# Patient Record
Sex: FEMALE | Race: WHITE | Employment: FULL TIME | URBAN - METROPOLITAN AREA
[De-identification: names, ages, dates, MRNs, and addresses within clinical notes are randomized per-mention and may not be internally consistent; named-entity substitution may affect disease eponyms.]

---

## 2017-05-07 DIAGNOSIS — G43.019 INTRACTABLE MIGRAINE WITHOUT AURA AND WITHOUT STATUS MIGRAINOSUS: ICD-10-CM

## 2017-05-08 NOTE — TELEPHONE ENCOUNTER
Imitrex       Last Written Prescription Date: 10/5/2016  Last Fill Quantity: 30, # refills: 0  Last Office Visit with [facility], [facility] or [facility] prescribing provider: 9/23/2015       BP Readings from Last 3 Encounters:   09/23/15 110/74   08/12/15 110/60   08/03/15 110/68

## 2017-05-09 NOTE — TELEPHONE ENCOUNTER
Routing refill request to provider for review/approval because:  Patient needs to be seen because it has been more than 1 year since last office visit.  Aleksandra Oakes RN

## 2017-05-10 RX ORDER — SUMATRIPTAN 100 MG/1
TABLET, FILM COATED ORAL
Qty: 30 TABLET | Refills: 0 | Status: SHIPPED | OUTPATIENT
Start: 2017-05-10 | End: 2017-08-15

## 2017-07-31 DIAGNOSIS — L70.9 ACNE, UNSPECIFIED ACNE TYPE: ICD-10-CM

## 2017-07-31 NOTE — TELEPHONE ENCOUNTER
Minocycline      Last Written Prescription Date: 10/05/16  Last Fill Quantity: 180,  # refills: 0   Last Office Visit with [facility], [facility] or  [facility] prescribing provider: 2015                                         Next 5 appointments (look out 90 days)     Aug 15, 2017  4:15 PM CDT   [facility] with Wendy Woodall PA-C   [facility]    [address] [place] [id number] [phone]

## 2017-08-01 RX ORDER — MINOCYCLINE HYDROCHLORIDE 50 MG/1
50 CAPSULE ORAL 2 TIMES DAILY
Qty: 180 CAPSULE | Refills: 0 | Status: SHIPPED | OUTPATIENT
Start: 2017-08-01 | End: 2017-08-15

## 2017-08-08 ENCOUNTER — HOSPITAL ENCOUNTER (EMERGENCY)
Facility: CLINIC | Age: 22
Discharge: HOME | End: 2017-08-09
Attending: PHYSICIAN ASSISTANT | Admitting: PHYSICIAN ASSISTANT
Payer: COMMERCIAL

## 2017-08-08 DIAGNOSIS — G43.001 MIGRAINE WITHOUT AURA AND WITH STATUS MIGRAINOSUS, NOT INTRACTABLE: ICD-10-CM

## 2017-08-08 PROCEDURE — 96365 THER/PROPH/DIAG IV INF INIT: CPT

## 2017-08-08 PROCEDURE — 99284 EMERGENCY DEPT VISIT MOD MDM: CPT | Mod: Z6 | Performed by: PHYSICIAN ASSISTANT

## 2017-08-08 PROCEDURE — 99284 EMERGENCY DEPT VISIT MOD MDM: CPT | Mod: 25

## 2017-08-08 PROCEDURE — 96375 TX/PRO/DX INJ NEW DRUG ADDON: CPT

## 2017-08-08 PROCEDURE — 25000128 H RX IP 250 OP 636: Performed by: PHYSICIAN ASSISTANT

## 2017-08-08 RX ORDER — SODIUM CHLORIDE 9 MG/ML
1000 INJECTION, SOLUTION INTRAVENOUS CONTINUOUS
Status: DISCONTINUED | OUTPATIENT
Start: 2017-08-08 | End: 2017-08-09 | Stop reason: HOSPADM

## 2017-08-08 RX ORDER — MAGNESIUM SULFATE 1 G/100ML
1 INJECTION INTRAVENOUS ONCE
Status: COMPLETED | OUTPATIENT
Start: 2017-08-08 | End: 2017-08-08

## 2017-08-08 RX ORDER — KETOROLAC TROMETHAMINE 30 MG/ML
30 INJECTION, SOLUTION INTRAMUSCULAR; INTRAVENOUS ONCE
Status: COMPLETED | OUTPATIENT
Start: 2017-08-08 | End: 2017-08-08

## 2017-08-08 RX ORDER — DIPHENHYDRAMINE HYDROCHLORIDE 50 MG/ML
25 INJECTION INTRAMUSCULAR; INTRAVENOUS ONCE
Status: COMPLETED | OUTPATIENT
Start: 2017-08-08 | End: 2017-08-08

## 2017-08-08 RX ADMIN — KETOROLAC TROMETHAMINE 30 MG: 30 INJECTION, SOLUTION INTRAMUSCULAR at 22:55

## 2017-08-08 RX ADMIN — SODIUM CHLORIDE, PRESERVATIVE FREE 1000 ML: 5 INJECTION INTRAVENOUS at 22:52

## 2017-08-08 RX ADMIN — PROCHLORPERAZINE EDISYLATE 10 MG: 5 INJECTION INTRAMUSCULAR; INTRAVENOUS at 22:57

## 2017-08-08 RX ADMIN — DIPHENHYDRAMINE HYDROCHLORIDE 25 MG: 50 INJECTION, SOLUTION INTRAMUSCULAR; INTRAVENOUS at 22:53

## 2017-08-08 RX ADMIN — MAGNESIUM SULFATE IN DEXTROSE 1 G: 10 INJECTION, SOLUTION INTRAVENOUS at 23:00

## 2017-08-08 NOTE — ED AVS SNAPSHOT
[facility] Emergency Department    [address] [place] [address]    Phone:  [phone]    Fax:  [phone]                                       Aleksandra Lucas   MRN: [id number]    Department:  [facility] Emergency Department   Date of Visit:  8/8/2017           After Visit Summary Signature Page     I have received my discharge instructions, and my questions have been answered. I have discussed any challenges I see with this plan with the nurse or doctor.    ..........................................................................................................................................  Patient/Patient Representative Signature      ..........................................................................................................................................  Patient Representative Print Name and Relationship to Patient    ..................................................               ................................................  Date                                            Time    ..........................................................................................................................................  Reviewed by Signature/Title    ...................................................              ..............................................  Date                                                            Time

## 2017-08-08 NOTE — ED AVS SNAPSHOT
[facility] Emergency Department    [address] [place] [address]    Phone:  [phone]    Fax:  [phone]                                       Aleksandra Lucas   MRN: [id number]    Department:  [facility] Emergency Department   Date of Visit:  8/8/2017           Patient Information     Date Of Birth          1995        Your diagnoses for this visit were:     Migraine without aura and with status migrainosus, not intractable        You were seen by Michel Burris PA-C.      Future Appointments        Provider Department Dept Phone Center    8/15/2017 4:15 PM Wendy Woodall PA-C [facility] [phone] [facility]      24 Hour Appointment Hotline       To make an appointment at any [facility], call [phone] ([phone]). If you don't have a family doctor or clinic, we will help you find one. [facility] are conveniently located to serve the needs of you and your family.             Review of your medicines      Our records show that you are taking the medicines listed below. If these are incorrect, please call your family doctor or clinic.        Dose / Directions Last dose taken    minocycline 50 MG capsule   Commonly known as:  MINOCIN/DYNACIN   Dose:  50 mg   Quantity:  180 capsule        Take 1 capsule (50 mg) by mouth 2 times daily   Refills:  0        SUMAtriptan 100 MG tablet   Commonly known as:  IMITREX   Quantity:  30 tablet        TAKE ONE TABLET BY MOUTH AT ONSET OF HEADACHE FOR MIGRAINE   Refills:  0                Procedures and tests performed during your visit     Peripheral IV: Standard      Orders Needing Specimen Collection     None      Pending Results     No orders found from 8/6/2017 to 8/9/2017.            Pending Culture Results     No orders found from 8/6/2017 to 8/9/2017.            Pending Results Instructions     If you had any lab results that were not finalized at the time of your Discharge, you  "can call the ED Lab Result RN at [phone]. You will be contacted by this team for any positive Lab results or changes in treatment. The nurses are available 7 days a week from 10A to 6:30P.  You can leave a message 24 hours per day and they will return your call.        Thank you for choosing [facility]       Thank you for choosing [facility] for your care. Our goal is always to provide you with excellent care. Hearing back from our patients is one way we can continue to improve our services. Please take a few minutes to complete the written survey that you may receive in the mail after you visit with us. Thank you!        [employer] Information     [employer] lets you send messages to your doctor, view your test results, renew your prescriptions, schedule appointments and more. To sign up, go to [email or web address] . Click on \"Log in\" on the left side of the screen, which will take you to the Welcome page. Then click on \"Sign up Now\" on the right side of the page.     You will be asked to enter the access code listed below, as well as some personal information. Please follow the directions to create your username and password.     Your access code is: [email or web address]  Expires: 2017 11:57 PM     Your access code will  in 90 days. If you need help or a new code, please call your [facility] clinic or [phone].        Care EveryWhere ID     This is your Care EveryWhere ID. This could be used by other organizations to access your [facility] medical records  [id number]        Equal Access to Services     DEEP THOMPSON : Hadii aad ku hadasho Sofinnali, waaxda luqadaha, qaybta kaalmada adedmitriyyada, ninfa villarreal. So [facility] [phone].    ATENCIÓN: Si habla español, tiene a brito disposición servicios gratuitos de asistencia lingüística. Llame al [phone].    We comply with applicable federal civil rights laws and [place] laws. We do not discriminate on the basis of race, color, national " origin, age, disability sex, sexual orientation or gender identity.            After Visit Summary       This is your record. Keep this with you and show to your community pharmacist(s) and doctor(s) at your next visit.

## 2017-08-09 VITALS
SYSTOLIC BLOOD PRESSURE: 111 MMHG | BODY MASS INDEX: 21.66 KG/M2 | WEIGHT: 130 LBS | TEMPERATURE: 97.6 F | OXYGEN SATURATION: 98 % | DIASTOLIC BLOOD PRESSURE: 71 MMHG | RESPIRATION RATE: 16 BRPM | HEIGHT: 65 IN

## 2017-08-09 NOTE — ED NOTES
Pt reports headache since Sunday that is about the same as what she normally has. Pt reports pain behind both eyes and back of neck with nausea but denies vomiting. Pt took a dose of Imitrex at 1600 and again at 1900, states her pain increased over the past 3 hours.

## 2017-08-09 NOTE — DISCHARGE INSTRUCTIONS
Preventing Triggers:      The first step in preventing migraines is to learn what triggers them. You may then be able to control your triggers to avoid or reduce the severity of your migraines.  Know your triggers  Be aware that you may have more than one trigger, and that some triggers may work together. Common migraine triggers include:    Food and nutrition. Skipping meals or not drinking enough water can trigger headaches. So can certain foods, such as caffeine, monosodium glutamate (MSG), aged cheese, or sausage.    Alcohol. Red wine and other alcoholic beverages are common migraine triggers.    Chemicals. Scents, cleaning products, gasoline, glue, perfume, and paint can be triggers. So can tobacco smoke, including secondhand smoke.    Emotions. Stress can trigger headaches or make them worse once they begin.    Sleep disruption. Staying up late, sleeping late, and traveling across time zones can disrupt your sleep cycle, triggering headaches.    Hormones. Many women notice that migraines tend to happen at a certain point in their menstrual cycle. Birth control pills or hormone replacement therapy may also trigger migraines.    Environment and weather. Air travel, changes in altitude, air pressure changes, hot sun, or bright or flashing lights can be triggers.  Control your triggers  These are some of the things you can do to try to control triggers:    Avoid triggers if you can. For example, stay clear of alcohol and foods that trigger your headaches. Use unscented household products. Keep regular sleep habits. Manage stress to help control emotional triggers.    Change your behavior at times when triggers can't be avoided. For example, make sure to get enough rest and drink plenty of water while you're traveling. Make sure to carry a hat, sunglasses, and your medicines. Be alert for migraine symptoms, so you can treat a migraine early if it happens.  Date Last Reviewed: 10/9/2015    [id number] [facility]  [facility]. [address] [place] [address], [place] [address]. All rights reserved. This information is not intended as a substitute for professional medical care. Always follow your healthcare professional's instructions.

## 2017-08-09 NOTE — ED PROVIDER NOTES
History     Chief Complaint   Patient presents with     Headache     HPI  Aleksandra Lucas is a 21 year old female who presents for evaluation of a migraine headache that started 3 days prior. She denies any recent head injury or MVA. No fevers, chills, or URI symptoms. Headache starts in the left posterior occipital area and radiates around to the left post orbital area. She describes it as a tension and squeezing type feeling with pain rated 8 on a scale of 10. Associated symptoms of photophobia, phonophobia, and nausea without vomiting. This is typical for her migraines. She is getting about 2 migraines per month. Usually her headaches resolved with Imitrex p.r.n., but this did not work for this headache. She has also trialed Tylenol and ibuprofen. No OTC pain medications were used today. She denies any extremity numbness, tingling, or weakness.    I have reviewed the Medications, Allergies, Past Medical and Surgical History, and Social History in the Epic system.    Allergies:   Allergies   Allergen Reactions     No Known Allergies          No current facility-administered medications on file prior to encounter.   Current Outpatient Prescriptions on File Prior to Encounter:  minocycline (MINOCIN/DYNACIN) 50 MG capsule Take 1 capsule (50 mg) by mouth 2 times daily   SUMAtriptan (IMITREX) 100 MG tablet TAKE ONE TABLET BY MOUTH AT ONSET OF HEADACHE FOR MIGRAINE       Patient Active Problem List   Diagnosis     History of pancreatitis     Acne     Factor 5 Leiden mutation, heterozygous (H)     Heavy periods     Intractable migraine without aura and without status migrainosus       Past Surgical History:   Procedure Laterality Date     OPEN REDUCTION INTERNAL FIXATION ELBOW  8/23/2011    Procedure:OPEN REDUCTION INTERNAL FIXATION ELBOW; open reduction internal fixation left elbow; Surgeon:TOOTIE DRAKE; Location: OR       Social History   Substance Use Topics     Smoking status: Never Smoker     Smokeless  "tobacco: Never Used      Comment: no smoking in the home     Alcohol use No       Most Recent Immunizations   Administered Date(s) Administered     DTAP (<7y) 09/26/2001     HIB 09/25/1997     HPVQuadrivalent 04/08/2013     HepB-Peds 08/03/2015     Hepatitis A Vac Ped/Adol-2 Dose 08/28/2007     Influenza (IIV3) 11/10/2011     Influenza Vaccine IM 3yrs+ 4 Valent IIV4 10/22/2013     MMR 09/26/2001     Meningococcal (Menactra ) 08/03/2015     OPV 03/27/1997     Poliovirus, inactivated (IPV) 09/26/2001     TDAP Vaccine (Adacel) 07/06/2009     Tetramune (DtP/HIB) 01/27/1997     Varicella 08/28/2007       BMI: Estimated body mass index is 21.63 kg/(m^2) as calculated from the following:    Height as of this encounter: 1.651 m (5' 5\").    Weight as of this encounter: 59 kg (130 lb).      Review of Systems   All other systems reviewed and are negative.      Physical Exam   BP: (!) 136/96  Heart Rate: 79  Temp: 97.6  F (36.4  C)  Resp: 16  Height: 165.1 cm (5' 5\")  Weight: 59 kg (130 lb)  SpO2: 100 %  Physical Exam  Generally healthy appearing female in NAD who is active and non-toxic appearing.   Head: Normocephalic, atraumatic, nontender to palpation  Eyes: PERRLA, conjunctiva and sclera clear.  EOM's intact.  Ears: Bilateral TM's and canals are clear.  TM's translucent without erythema or effusion.  Nose: Nares normal and patent bilaterally.  Mucous membranes are non-erythematous and non-edematous.  No sinus tenderness.  Throat: Mucous membranes are clear.  No tonsilar hypertrophy, exudate, or erythema.  Neck: Supple.  FROM without pain.  No adenopathy.  No thyromegaly.  No nuchal rigidity. Negative Brudzinski's and Kernig sign.  Heart:  RRR with normal S1 and S2.  No S3 or S4.  No murmur, rub, gallop, or click.  PMI is nondisplaced.   Lungs:  CTA bilaterally without wheezes, rales, or rhonchi.  Good breath sounds heard throughout all lung fields.  Tympanitic to percussion with no areas of dullness.   Neuro:  Cranial " nerves II-XII grossly intact.  Romberg is steady.  Gait WNL's.  Cerebellar testing is WNL's.  Sensation is intact to light touch throughout.  Bicep, brachioradialis, patellar, and achilles DTR's 2/4 without clonus.  No abnormality identified.       ED Course     ED Course     Procedures             Critical Care time:  none               Labs Ordered and Resulted from Time of ED Arrival Up to the Time of Departure from the ED - No data to display    Assessments & Plan (with Medical Decision Making)  Migraine without aura and with status migrainosus, not intractable     21 year old female with a past history of migraines presents for evaluation of a persistent migraine for the past 3 days not responding to her typical therapy of Imitrex and OTC anti-inflammatories.  On exam her vital signs are normal. Nonfocal normal neurological exam noted.   given her normal exam, imaging was not pursued. IV migraine headache protocol initiated with 1 L IV normal saline, IV Benadryl, IV Compazine, IV Toradol, and IV magnesium. One hour after administration of the medication, her headache had completely resolved. She had been sleeping for 45 minutes when I awoke her to reassess her situation. She states that she is ready to be discharged home, and was very pleased with the results of the treatment. We discussed migraine triggers to work on avoiding. Follow up with PCP if migraines continued to increase in frequency or intensity. Return to the ED if symptoms worsen. Mother and patient were in agreement with this plan and she was suitable for discharge.      I have reviewed the nursing notes.    I have reviewed the findings, diagnosis, plan and need for follow up with the patient.       Discharge Medication List as of 8/8/2017 11:57 PM          Final diagnoses:   Migraine without aura and with status migrainosus, not intractable       Disclaimer: This note consists of symbols derived from keyboarding, dictation and/or voice  recognition software. As a result, there may be errors in the script that have gone undetected. Please consider this when interpreting information found in this chart.       8/8/2017   Michel Burris PA-C   [facility] EMERGENCY DEPARTMENT     Michel Burris PA-C  08/09/17 0011

## 2017-08-15 ENCOUNTER — OFFICE VISIT (OUTPATIENT)
Dept: FAMILY MEDICINE | Facility: CLINIC | Age: 22
End: 2017-08-15
Payer: COMMERCIAL

## 2017-08-15 VITALS
RESPIRATION RATE: 16 BRPM | HEART RATE: 60 BPM | OXYGEN SATURATION: 100 % | TEMPERATURE: 98.1 F | DIASTOLIC BLOOD PRESSURE: 70 MMHG | BODY MASS INDEX: 21.66 KG/M2 | SYSTOLIC BLOOD PRESSURE: 116 MMHG | HEIGHT: 65 IN | WEIGHT: 130 LBS

## 2017-08-15 DIAGNOSIS — G43.019 INTRACTABLE MIGRAINE WITHOUT AURA AND WITHOUT STATUS MIGRAINOSUS: ICD-10-CM

## 2017-08-15 DIAGNOSIS — L70.9 ACNE, UNSPECIFIED ACNE TYPE: ICD-10-CM

## 2017-08-15 DIAGNOSIS — Z00.00 ENCOUNTER FOR ROUTINE ADULT HEALTH EXAMINATION WITHOUT ABNORMAL FINDINGS: Primary | ICD-10-CM

## 2017-08-15 PROCEDURE — 87491 CHLMYD TRACH DNA AMP PROBE: CPT | Performed by: PHYSICIAN ASSISTANT

## 2017-08-15 PROCEDURE — 99395 PREV VISIT EST AGE 18-39: CPT | Performed by: PHYSICIAN ASSISTANT

## 2017-08-15 PROCEDURE — 87591 N.GONORRHOEAE DNA AMP PROB: CPT | Performed by: PHYSICIAN ASSISTANT

## 2017-08-15 RX ORDER — MINOCYCLINE HYDROCHLORIDE 50 MG/1
50 CAPSULE ORAL 2 TIMES DAILY
Qty: 180 CAPSULE | Refills: 3 | Status: SHIPPED | OUTPATIENT
Start: 2017-08-15 | End: 2018-04-10

## 2017-08-15 RX ORDER — SUMATRIPTAN 100 MG/1
TABLET, FILM COATED ORAL
Qty: 30 TABLET | Refills: 3 | Status: SHIPPED | OUTPATIENT
Start: 2017-08-15 | End: 2017-08-28 | Stop reason: ALTCHOICE

## 2017-08-15 RX ORDER — SUMATRIPTAN 100 MG/1
TABLET, FILM COATED ORAL
Qty: 30 TABLET | Refills: 3 | Status: CANCELLED | OUTPATIENT
Start: 2017-08-15

## 2017-08-15 ASSESSMENT — PAIN SCALES - GENERAL: PAINLEVEL: NO PAIN (0)

## 2017-08-15 NOTE — PROGRESS NOTES
SUBJECTIVE:   CC: Aleksandra Lucas is an 21 year old woman who presents for preventive health visit.     Healthy Habits:    Do you get at least three servings of calcium containing foods daily (dairy, green leafy vegetables, etc.)? yes    Amount of exercise or daily activities, outside of work: 5 day(s) per week    Problems taking medications regularly No    Medication side effects: No    Have you had an eye exam in the past two years? yes    Do you see a dentist twice per year? no    Do you have sleep apnea, excessive snoring or daytime drowsiness?no          Migraine Follow-Up    Headaches symptoms:  Worsened has been in ED recently     Frequency: daily      Duration of headaches: all day     Able to do normal daily activities/work with migraines: No -     Rescue/Relief medication:sumatriptan (Imitrex)              Effectiveness: intermittent relief    Preventative medication: None    Neurologic complications: No new stroke-like symptoms, loss of vision or speech, numbness or weakness    In the past 4 weeks, how often have you gone to Urgent Care or the emergency room because of your headaches?  1    Recheck Acne         Today's PHQ-2 Score:   PHQ-2 ( 1999 Pfizer) 8/15/2017   Q1: Little interest or pleasure in doing things 0   Q2: Feeling down, depressed or hopeless 0   PHQ-2 Score 0         Abuse: Current or Past(Physical, Sexual or Emotional)- No  Do you feel safe in your environment - Yes  Social History   Substance Use Topics     Smoking status: Never Smoker     Smokeless tobacco: Never Used      Comment: no smoking in the home     Alcohol use No     The patient does not drink >3 drinks per day nor >7 drinks per week.    Reviewed orders with patient.  Reviewed health maintenance and updated orders accordingly - Yes  Patient Active Problem List   Diagnosis     Acne     Factor 5 Leiden mutation, heterozygous (H)     Heavy periods     Intractable migraine without aura and without status migrainosus     Past  Surgical History:   Procedure Laterality Date     OPEN REDUCTION INTERNAL FIXATION ELBOW  8/23/2011    Procedure:OPEN REDUCTION INTERNAL FIXATION ELBOW; open reduction internal fixation left elbow; Surgeon:TOOTIE DRAKE; Location: OR       Social History   Substance Use Topics     Smoking status: Never Smoker     Smokeless tobacco: Never Used      Comment: no smoking in the home     Alcohol use No     Family History   Problem Relation Age of Onset     Blood Disease Mother      POSITIVE FACTOR 5     Breast Cancer Maternal Grandmother      Lung Cancer Maternal Grandmother      DIABETES No family hx of      Hypertension No family hx of      Colon Cancer No family hx of      Prostate Cancer No family hx of              Mammogram not appropriate for this patient based on age.    Pertinent mammograms are reviewed under the imaging tab.  History of abnormal Pap smear: NO - age 21-29 PAP every 3 years recommended    [facility]  [facility]         Reviewed and updated as needed this visit by Provider            ROS:  C: NEGATIVE for fever, chills, change in weight  I: NEGATIVE for worrisome rashes, moles or lesions  E: NEGATIVE for vision changes or irritation  ENT: NEGATIVE for ear, mouth and throat problems  R: NEGATIVE for significant cough or SOB  B: NEGATIVE for masses, tenderness or discharge  CV: NEGATIVE for chest pain, palpitations or peripheral edema  GI: NEGATIVE for nausea, abdominal pain, heartburn, or change in bowel habits  : NEGATIVE for unusual urinary or vaginal symptoms. Periods are regular.  M: NEGATIVE for significant arthralgias or myalgia  N: NEGATIVE for weakness, dizziness or paresthesias  E: NEGATIVE for temperature intolerance, skin/hair changes  H: NEGATIVE for bleeding problems  P: NEGATIVE for changes in mood or affect    OBJECTIVE:   There were no vitals taken for this visit.  EXAM:  GENERAL: healthy, alert and no distress  EYES:  Eyes grossly normal to inspection, PERRL and conjunctivae and sclerae normal  HENT: ear canals and TM's normal, nose and mouth without ulcers or lesions  NECK: no adenopathy, no asymmetry, masses, or scars and thyroid normal to palpation  RESP: lungs clear to auscultation - no rales, rhonchi or wheezes  BREAST: normal without masses, tenderness or nipple discharge and no palpable axillary masses or adenopathy  CV: regular rate and rhythm, normal S1 S2, no S3 or S4, no murmur, click or rub, no peripheral edema and peripheral pulses strong  ABDOMEN: soft, nontender, no hepatosplenomegaly, no masses and bowel sounds normal   (female): normal female external genitalia, normal urethral meatus, vaginal mucosa pink, moist, well rugated, and normal cervix (IUD STRINGS VISUALIZED)  /adnexa/uterus without masses or discharge  MS: no gross musculoskeletal defects noted, no edema  SKIN: no suspicious lesions or rashes  NEURO: Normal strength and tone, mentation intact and speech normal  PSYCH: mentation appears normal, affect normal/bright    ASSESSMENT/PLAN:       ICD-10-CM    1. Encounter for routine adult health examination without abnormal findings Z00.00 Levonorgestrel (TED) 13.5 MG IUD     minocycline (MINOCIN/DYNACIN) 50 MG capsule     SUMAtriptan (IMITREX) 100 MG tablet     Neisseria gonorrhoeae PCR     Chlamydia trachomatis PCR   2. Intractable migraine without aura and without status migrainosus G43.019 Levonorgestrel (TED) 13.5 MG IUD     minocycline (MINOCIN/DYNACIN) 50 MG capsule     SUMAtriptan (IMITREX) 100 MG tablet     Neisseria gonorrhoeae PCR     Chlamydia trachomatis PCR   3. Acne, unspecified acne type L70.9 Levonorgestrel (TED) 13.5 MG IUD     minocycline (MINOCIN/DYNACIN) 50 MG capsule     SUMAtriptan (IMITREX) 100 MG tablet     Neisseria gonorrhoeae PCR     Chlamydia trachomatis PCR       COUNSELING:   Reviewed preventive health counseling, as reflected in patient instructions       Regular  "exercise       Healthy diet/nutrition       Vision screening         reports that she has never smoked. She has never used smokeless tobacco.    Estimated body mass index is 21.63 kg/(m^2) as calculated from the following:    Height as of 8/8/17: 5' 5\" (1.651 m).    Weight as of 8/8/17: 130 lb (59 kg).     Refilled meds for acne & migraines but did increase dose of Imitrex to 100mg at onset - stable.  Suggested Magnesium 400mg daily along with Riboflavin (Vitamin B2) 200mg daily.  Both of these products help prevent migraine headaches.             Counseling Resources:  ATP IV Guidelines  Pooled Cohorts Equation Calculator  Breast Cancer Risk Calculator  FRAX Risk Assessment  ICSI Preventive Guidelines  Dietary Guidelines for Americans, 2010  USDA's MyPlate  ASA Prophylaxis  Lung CA Screening    Wendy Woodall PA-C  [facility]  Orders Placed This Encounter     Levonorgestrel (TED) 13.5 MG IUD     minocycline (MINOCIN/DYNACIN) 50 MG capsule     SUMAtriptan (IMITREX) 100 MG tablet       AVS given to patient upon discharge today.  Electronically signed by Wendy Woodall PA-C  August 16, 2017  12:42 PM      "

## 2017-08-15 NOTE — MR AVS SNAPSHOT
After Visit Summary   8/15/2017    Aleksandra Lucas    MRN: [id number]           Patient Information     Date Of Birth          1995        Visit Information        Provider Department      8/15/2017 4:15 PM Wendy Woodall PA-C [facility]        Today's Diagnoses     Intractable migraine without aura and without status migrainosus        Acne, unspecified acne type          Care Instructions      Preventive Health Recommendations  Female Ages 18 to 25     Yearly exam:     See your health care provider every year in order to  o Review health changes.   o Discuss preventive care.    o Review your medicines if your doctor has prescribed any.      You should be tested each year for STDs (sexually transmitted diseases).       After age 20, talk to your provider about how often you should have cholesterol testing.      Starting at age 21, get a Pap test every three years. If you have an abnormal result, your doctor may have you test more often.      If you are at risk for diabetes, you should have a diabetes test (fasting glucose).     Shots:     Get a flu shot each year.     Get a tetanus shot every 10 years.     Consider getting the shot (vaccine) that prevents cervical cancer (Gardasil).    Nutrition:     Eat at least 5 servings of fruits and vegetables each day.    Eat whole-grain bread, whole-wheat pasta and brown rice instead of white grains and rice.    Talk to your provider about Calcium and Vitamin D.     Lifestyle    Exercise at least 150 minutes a week each week (30 minutes a day, 5 days a week). This will help you control your weight and prevent disease.    Limit alcohol to one drink per day.    No smoking.     Wear sunscreen to prevent skin cancer.    See your dentist every six months for an exam and cleaning.      Suggested Magnesium 400mg daily along with Riboflavin (Vitamin B2) 200mg daily.  Both of these products help prevent migraine headaches.              "Follow-ups after your visit        Who to contact     If you have questions or need follow up information about today's clinic visit or your schedule please contact [facility] directly at [phone].  Normal or non-critical lab and imaging results will be communicated to you by MyChart, letter or phone within 4 business days after the clinic has received the results. If you do not hear from us within 7 days, please contact the clinic through MyChart or phone. If you have a critical or abnormal lab result, we will notify you by phone as soon as possible.  Submit refill requests through [employer] or call your pharmacy and they will forward the refill request to us. Please allow 3 business days for your refill to be completed.          Additional Information About Your Visit        [employer] Information     [employer] lets you send messages to your doctor, view your test results, renew your prescriptions, schedule appointments and more. To sign up, go to [email or web address] . Click on \"Log in\" on the left side of the screen, which will take you to the Welcome page. Then click on \"Sign up Now\" on the right side of the page.     You will be asked to enter the access code listed below, as well as some personal information. Please follow the directions to create your username and password.     Your access code is: [email or web address]  Expires: 2017 11:57 PM     Your access code will  in 90 days. If you need help or a new code, please call your [facility] clinic or [phone].        Care EveryWhere ID     This is your Care EveryWhere ID. This could be used by other organizations to access your [facility] medical records  [id number]        Your Vitals Were     Pulse Temperature Respirations Height Pulse Oximetry BMI (Body Mass Index)    60 98.1  F (36.7  C) (Tympanic) 16 5' 5.2\" (1.656 m) 100% 21.5 kg/m2       Blood Pressure from Last 3 Encounters:   08/15/17 116/70   17 111/71   09/23/15 110/74    " Weight from Last 3 Encounters:   08/15/17 130 lb (59 kg)   08/08/17 130 lb (59 kg)   09/23/15 122 lb 6.4 oz (55.5 kg) (42 %)*     * Growth percentiles are based on [facility] 2-20 Years data.              Today, you had the following     No orders found for display         Today's Medication Changes          These changes are accurate as of: 8/15/17  5:18 PM.  If you have any questions, ask your nurse or doctor.               These medicines have changed or have updated prescriptions.        Dose/Directions    SUMAtriptan 100 MG tablet   Commonly known as:  IMITREX   This may have changed:  See the new instructions.   Used for:  Intractable migraine without aura and without status migrainosus   Changed by:  Wendy Woodall PA-C        TAKE ONE TABLET BY MOUTH AT ONSET OF HEADACHE FOR MIGRAINE   Quantity:  30 tablet   Refills:  3            Where to get your medicines      These medications were sent to [facility] [address] [place] [address]     Phone:  [phone]     minocycline 50 MG capsule    SUMAtriptan 100 MG tablet                Primary Care Provider Office Phone # Fax #    Wendy Woodall PA-C [phone] [address] [place] [address]        Equal Access to Services     DEEP THOMPSON AH: Hadii darell ku hadasho Soomaali, waaxda luqadaha, qaybta kaalmada adeegyada, waxay idiin hayaan sienna khbrittany villarreal. So [facility] [phone].    ATENCIÓN: Si habla español, tiene a brito disposición servicios gratuitos de asistencia lingüística. Llame al [phone].    We comply with applicable federal civil rights laws and [place] laws. We do not discriminate on the basis of race, color, national origin, age, disability sex, sexual orientation or gender identity.            Thank you!     Thank you for choosing [facility]  for your care. Our goal is always to provide you with excellent care. Hearing back from our  patients is one way we can continue to improve our services. Please take a few minutes to complete the written survey that you may receive in the mail after your visit with us. Thank you!             Your Updated Medication List - Protect others around you: Learn how to safely use, store and throw away your medicines at www.disposemymeds.org.          This list is accurate as of: 8/15/17  5:18 PM.  Always use your most recent med list.                   Brand Name Dispense Instructions for use Diagnosis    minocycline 50 MG capsule    MINOCIN/DYNACIN    180 capsule    Take 1 capsule (50 mg) by mouth 2 times daily    Acne, unspecified acne type       TED 13.5 MG Iud   Generic drug:  Levonorgestrel      Inserted 8/15/2015        SUMAtriptan 100 MG tablet    IMITREX    30 tablet    TAKE ONE TABLET BY MOUTH AT ONSET OF HEADACHE FOR MIGRAINE    Intractable migraine without aura and without status migrainosus

## 2017-08-15 NOTE — NURSING NOTE
"Chief Complaint   Patient presents with     Physical       Initial /70  Pulse 60  Temp 98.1  F (36.7  C) (Tympanic)  Resp 16  Ht 5' 5.2\" (1.656 m)  Wt 130 lb (59 kg)  SpO2 100%  BMI 21.5 kg/m2 Estimated body mass index is 21.5 kg/(m^2) as calculated from the following:    Height as of this encounter: 5' 5.2\" (1.656 m).    Weight as of this encounter: 130 lb (59 kg).  BP completed using cuff size: joe Spears MA      "

## 2017-08-15 NOTE — PATIENT INSTRUCTIONS
Preventive Health Recommendations  Female Ages 18 to 25     Yearly exam:     See your health care provider every year in order to  o Review health changes.   o Discuss preventive care.    o Review your medicines if your doctor has prescribed any.      You should be tested each year for STDs (sexually transmitted diseases).       After age 20, talk to your provider about how often you should have cholesterol testing.      Starting at age 21, get a Pap test every three years. If you have an abnormal result, your doctor may have you test more often.      If you are at risk for diabetes, you should have a diabetes test (fasting glucose).     Shots:     Get a flu shot each year.     Get a tetanus shot every 10 years.     Consider getting the shot (vaccine) that prevents cervical cancer (Gardasil).    Nutrition:     Eat at least 5 servings of fruits and vegetables each day.    Eat whole-grain bread, whole-wheat pasta and brown rice instead of white grains and rice.    Talk to your provider about Calcium and Vitamin D.     Lifestyle    Exercise at least 150 minutes a week each week (30 minutes a day, 5 days a week). This will help you control your weight and prevent disease.    Limit alcohol to one drink per day.    No smoking.     Wear sunscreen to prevent skin cancer.    See your dentist every six months for an exam and cleaning.      Suggested Magnesium 400mg daily along with Riboflavin (Vitamin B2) 200mg daily.  Both of these products help prevent migraine headaches.

## 2017-08-17 LAB
C TRACH DNA SPEC QL NAA+PROBE: NEGATIVE
N GONORRHOEA DNA SPEC QL NAA+PROBE: NEGATIVE
SPECIMEN SOURCE: NORMAL
SPECIMEN SOURCE: NORMAL

## 2017-08-23 ENCOUNTER — TELEPHONE (OUTPATIENT)
Dept: FAMILY MEDICINE | Facility: CLINIC | Age: 22
End: 2017-08-23

## 2017-08-23 ENCOUNTER — OFFICE VISIT (OUTPATIENT)
Dept: FAMILY MEDICINE | Facility: CLINIC | Age: 22
End: 2017-08-23
Payer: COMMERCIAL

## 2017-08-23 ENCOUNTER — HOSPITAL ENCOUNTER (OUTPATIENT)
Dept: MRI IMAGING | Facility: CLINIC | Age: 22
Discharge: HOME | End: 2017-08-23
Attending: PHYSICIAN ASSISTANT | Admitting: PHYSICIAN ASSISTANT
Payer: COMMERCIAL

## 2017-08-23 VITALS
HEART RATE: 82 BPM | OXYGEN SATURATION: 100 % | DIASTOLIC BLOOD PRESSURE: 70 MMHG | TEMPERATURE: 97.8 F | SYSTOLIC BLOOD PRESSURE: 110 MMHG | WEIGHT: 132 LBS | RESPIRATION RATE: 18 BRPM | BODY MASS INDEX: 21.83 KG/M2

## 2017-08-23 DIAGNOSIS — R51.9 CHRONIC DAILY HEADACHE: ICD-10-CM

## 2017-08-23 DIAGNOSIS — H53.9 VISUAL DISTURBANCE: ICD-10-CM

## 2017-08-23 DIAGNOSIS — R51.9 CHRONIC DAILY HEADACHE: Primary | ICD-10-CM

## 2017-08-23 LAB
ALBUMIN SERPL-MCNC: 4.4 G/DL (ref 3.4–5)
ALP SERPL-CCNC: 106 U/L (ref 40–150)
ALT SERPL W P-5'-P-CCNC: 32 U/L (ref 0–50)
ANION GAP SERPL CALCULATED.3IONS-SCNC: 6 MMOL/L (ref 3–14)
AST SERPL W P-5'-P-CCNC: 24 U/L (ref 0–45)
BASOPHILS # BLD AUTO: 0 10E9/L (ref 0–0.2)
BASOPHILS NFR BLD AUTO: 0.2 %
BILIRUB SERPL-MCNC: 0.7 MG/DL (ref 0.2–1.3)
BUN SERPL-MCNC: 12 MG/DL (ref 7–30)
CALCIUM SERPL-MCNC: 9.1 MG/DL (ref 8.5–10.1)
CHLORIDE SERPL-SCNC: 104 MMOL/L (ref 94–109)
CO2 SERPL-SCNC: 29 MMOL/L (ref 20–32)
CREAT SERPL-MCNC: 0.66 MG/DL (ref 0.52–1.04)
CRP SERPL-MCNC: <2.9 MG/L (ref 0–8)
DIFFERENTIAL METHOD BLD: NORMAL
EOSINOPHIL # BLD AUTO: 0.1 10E9/L (ref 0–0.7)
EOSINOPHIL NFR BLD AUTO: 1.6 %
ERYTHROCYTE [DISTWIDTH] IN BLOOD BY AUTOMATED COUNT: 12.5 % (ref 10–15)
ERYTHROCYTE [SEDIMENTATION RATE] IN BLOOD BY WESTERGREN METHOD: 7 MM/H (ref 0–20)
GFR SERPL CREATININE-BSD FRML MDRD: >90 ML/MIN/1.7M2
GLUCOSE SERPL-MCNC: 80 MG/DL (ref 70–99)
HCT VFR BLD AUTO: 44.5 % (ref 35–47)
HGB BLD-MCNC: 15.5 G/DL (ref 11.7–15.7)
IMM GRANULOCYTES # BLD: 0 10E9/L (ref 0–0.4)
IMM GRANULOCYTES NFR BLD: 0.2 %
LYMPHOCYTES # BLD AUTO: 2.1 10E9/L (ref 0.8–5.3)
LYMPHOCYTES NFR BLD AUTO: 36.8 %
MCH RBC QN AUTO: 31.6 PG (ref 26.5–33)
MCHC RBC AUTO-ENTMCNC: 34.8 G/DL (ref 31.5–36.5)
MCV RBC AUTO: 91 FL (ref 78–100)
MONOCYTES # BLD AUTO: 0.4 10E9/L (ref 0–1.3)
MONOCYTES NFR BLD AUTO: 6.3 %
NEUTROPHILS # BLD AUTO: 3.2 10E9/L (ref 1.6–8.3)
NEUTROPHILS NFR BLD AUTO: 54.9 %
PLATELET # BLD AUTO: 252 10E9/L (ref 150–450)
POTASSIUM SERPL-SCNC: 3.5 MMOL/L (ref 3.4–5.3)
PROT SERPL-MCNC: 8.6 G/DL (ref 6.8–8.8)
RBC # BLD AUTO: 4.9 10E12/L (ref 3.8–5.2)
SODIUM SERPL-SCNC: 139 MMOL/L (ref 133–144)
TSH SERPL DL<=0.005 MIU/L-ACNC: 1.12 MU/L (ref 0.4–4)
WBC # BLD AUTO: 5.7 10E9/L (ref 4–11)

## 2017-08-23 PROCEDURE — 86665 EPSTEIN-BARR CAPSID VCA: CPT | Performed by: PHYSICIAN ASSISTANT

## 2017-08-23 PROCEDURE — 86666 EHRLICHIA ANTIBODY: CPT | Mod: 90 | Performed by: PHYSICIAN ASSISTANT

## 2017-08-23 PROCEDURE — 99215 OFFICE O/P EST HI 40 MIN: CPT | Performed by: PHYSICIAN ASSISTANT

## 2017-08-23 PROCEDURE — 86618 LYME DISEASE ANTIBODY: CPT | Performed by: PHYSICIAN ASSISTANT

## 2017-08-23 PROCEDURE — 70551 MRI BRAIN STEM W/O DYE: CPT

## 2017-08-23 PROCEDURE — 87798 DETECT AGENT NOS DNA AMP: CPT | Mod: 90 | Performed by: PHYSICIAN ASSISTANT

## 2017-08-23 PROCEDURE — 86747 PARVOVIRUS ANTIBODY: CPT | Mod: 90 | Performed by: PHYSICIAN ASSISTANT

## 2017-08-23 PROCEDURE — 86140 C-REACTIVE PROTEIN: CPT | Performed by: PHYSICIAN ASSISTANT

## 2017-08-23 PROCEDURE — 36415 COLL VENOUS BLD VENIPUNCTURE: CPT | Performed by: PHYSICIAN ASSISTANT

## 2017-08-23 PROCEDURE — 99000 SPECIMEN HANDLING OFFICE-LAB: CPT | Performed by: PHYSICIAN ASSISTANT

## 2017-08-23 PROCEDURE — 80050 GENERAL HEALTH PANEL: CPT | Performed by: PHYSICIAN ASSISTANT

## 2017-08-23 PROCEDURE — 85652 RBC SED RATE AUTOMATED: CPT | Performed by: PHYSICIAN ASSISTANT

## 2017-08-23 RX ORDER — NARATRIPTAN 2.5 MG/1
2.5 TABLET ORAL
Qty: 27 TABLET | Refills: 3 | Status: SHIPPED | OUTPATIENT
Start: 2017-08-23 | End: 2017-09-11

## 2017-08-23 RX ORDER — PREDNISONE 20 MG/1
40 TABLET ORAL DAILY
Qty: 10 TABLET | Refills: 0 | Status: SHIPPED | OUTPATIENT
Start: 2017-08-23 | End: 2017-08-28

## 2017-08-23 RX ORDER — NORTRIPTYLINE HCL 25 MG
25 CAPSULE ORAL AT BEDTIME
Qty: 90 CAPSULE | Refills: 3 | Status: SHIPPED | OUTPATIENT
Start: 2017-08-23 | End: 2018-04-10

## 2017-08-23 ASSESSMENT — PAIN SCALES - GENERAL: PAINLEVEL: NO PAIN (0)

## 2017-08-23 NOTE — PROGRESS NOTES
"  SUBJECTIVE:   Aleksandra Lucas is a 21 year old female here today with her mom who presents to clinic today for the following health issues:      Migraine Follow-Up  Has had headaches since she was a young child-in first grade was actually started on Inderal through a neurologist along with Zomig for acute headache. Took 20 mg daily of the Inderal. Was in the ED on 8/8/2017 with a severe migraine-fortunately it was treatable with IV medication. She was urged to use her Imitrex at the onset of headache. I had seen her for a physical soon thereafter and I urged her to trial 100 mg of Imitrex at the onset, she states that she \"feels weird\" when she takes that amount and went back to 50 mg. Is taking this on an almost daily basis due to chronic headache. Finds NSAIDs and Excedrin unhelpful at the onset of headache. Sometimes uses caffeine with some good response. Did start the magnesium and vitamin B2 regimen I given her last week on a daily basis-it doesn't seem that it's helping well.    Headaches tend to be in the occiput area and then had into the bilateral temporal area and behind her eyes. She has had a visual exam done. She states that they start dull and then \"escalate\". In the last 2 days she has developed more of a blurry vision issue than she's ever had and that what prompted her to call to be seen today. She has no changes in her life-she is a student at [employer], business major and plays hockey. She also works a couple of jobs and is very busy. States she is not stressed and really hasn't had any change. [place] that [place] could continue to work.      Headaches symptoms:  Worsened     Frequency: daily, 4 days out of the last month hasn't had one      Duration of headaches: all day     Able to do normal daily activities/work with migraines: No -     Rescue/Relief medication:sumatriptan (Imitrex)              Effectiveness: total relief, if taken early enough "     Preventative medication: None    Neurologic complications: loss of vision    In the past 4 weeks, how often have you gone to Urgent Care or the emergency room because of your headaches?  1 8/8/2017        Amount of exercise or physical activity: 2-3 days/week for an average of 15-30 minutes    Problems taking medications regularly: No    Medication side effects: none  Diet: regular (no restrictions)          Problem list and histories reviewed & adjusted, as indicated.  Additional history: as documented    Patient Active Problem List   Diagnosis     Acne     Factor 5 Leiden mutation, heterozygous (H)     Heavy periods     Intractable migraine without aura and without status migrainosus     Past Surgical History:   Procedure Laterality Date     OPEN REDUCTION INTERNAL FIXATION ELBOW  8/23/2011    Procedure:OPEN REDUCTION INTERNAL FIXATION ELBOW; open reduction internal fixation left elbow; Surgeon:TOOTIE DRAKE; Location: OR       Social History   Substance Use Topics     Smoking status: Never Smoker     Smokeless tobacco: Never Used      Comment: no smoking in the home     Alcohol use No     Family History   Problem Relation Age of Onset     Blood Disease Mother      POSITIVE FACTOR 5     Breast Cancer Maternal Grandmother      Lung Cancer Maternal Grandmother      DIABETES No family hx of      Hypertension No family hx of      Colon Cancer No family hx of      Prostate Cancer No family hx of              Reviewed and updated as needed [facility]       Reviewed and updated as needed this visit by Provider         ROS:  Constitutional, HEENT, cardiovascular, pulmonary, GI, , musculoskeletal, neuro, skin, endocrine and psych systems are negative, except as otherwise noted.      OBJECTIVE:   /70  Pulse 82  Temp 97.8  F (36.6  C) (Tympanic)  Resp 18  Wt 132 lb (59.9 kg)  SpO2 100%  BMI 21.83 kg/m2  Body mass index is 21.83 kg/(m^2).   GENERAL: healthy, alert and  tearful during the visit-frustrated  EYES: Eyes grossly normal to inspection, PERRL and conjunctivae and sclerae normal  HENT: ear canals and TM's normal, nose and mouth without ulcers or lesions  NECK: no adenopathy, no asymmetry, masses, or scars and thyroid normal to palpation  RESP: lungs clear to auscultation - no rales, rhonchi or wheezes  CV: regular rate and rhythm, normal S1 S2, no S3 or S4, no murmur, click or rub, no peripheral edema and peripheral pulses strong  ABDOMEN: soft, nontender, no hepatosplenomegaly, no masses and bowel sounds normal  MS: no gross musculoskeletal defects noted, no edema  SKIN: no suspicious lesions or rashes  NEURO: Normal strength and tone, sensory exam grossly normal, mentation intact, oriented times 3, speech normal, cranial nerves 2-12 intact, DTR's normal and symmetric , gait normal including heel/toe/tandem walking, Romberg normal and rapid alternating movements normal  PSYCH: mentation appears normal and affect normal/bright    Diagnostic Test Results:  Extensive lab studies an MRI of the head are ordered.    ASSESSMENT:      Chronic daily headache  Visual disturbance      PLAN:       ICD-10-CM    1. Chronic daily headache R51 naratriptan (AMERGE) 2.5 MG tablet     nortriptyline (PAMELOR) 25 MG capsule     predniSONE (DELTASONE) 20 MG tablet     CBC with platelets differential     CRP inflammation     Erythrocyte sedimentation rate auto     TSH with free T4 reflex     Comprehensive metabolic panel     EBV Capsid Antibody IgM     EBV Capsid Antibody IgG     Lyme Disease Geri with reflex to WB Serum     Ehrlichia Anaplasma Sp by PCR     Anaplasma phagocytoph antibody IgG IgM     Parvovirus B19 antibodies IgG IgM     CANCELED: MR Brain w/o & w Contrast   2. Visual disturbance H53.9 naratriptan (AMERGE) 2.5 MG tablet     nortriptyline (PAMELOR) 25 MG capsule     predniSONE (DELTASONE) 20 MG tablet     CBC with platelets differential     CRP inflammation     Erythrocyte  sedimentation rate auto     TSH with free T4 reflex     Comprehensive metabolic panel     EBV Capsid Antibody IgM     EBV Capsid Antibody IgG     Lyme Disease Geri with reflex to WB Serum     Ehrlichia Anaplasma Sp by PCR     Anaplasma phagocytoph antibody IgG IgM     Parvovirus B19 antibodies IgG IgM     CANCELED: MR Brain w/o & w Contrast           MEDICATIONS:   She has been having chronic daily headaches. Did do a large series of lab studies that will not be back by the end of visit. Suggested 5 days of prednisone to see if there is an inflammatory issue going on will help with the headache pain.       - Trial of nortriptyline 25 mg at bedtime (she has been on a beta blocker as a young child, but would like to avoid a beta blocker given the fact that she is a college  and I'm concerned that it may affect her performance)-may increase this after a week or two if doing well. Reviewed side effect profile with her today. She does not like how she feels using the Imitrex. Suggested she try Amerge at the onset of headache-would also like her to take an anti-inflammatory such as ibuprofen or Excedrin Migraine.       - Discontinue Imitrex       - Continue other medications without change  FURTHER TESTING:       - MRI of the head is ordered-we'll contact her with results as a return       - Lab studies are pending-extensive studies were ordered today.  CONSULTATION/REFERRAL to neurology if symptoms persist or worsen  FUTURE APPOINTMENTS:       - Follow-up visit in 3-4 weeks for recheck.          Wendy Woodall PA-C  [facility]    GREATER THAN 50% OF TIME SPENT IN COUNSELING & CARE COORDINATION along with review of past treatments, recent ED visits - TOTAL FACE TO FACE TIME  45 MINUTES.    Orders Placed This Encounter     CBC with platelets differential     CRP inflammation     Erythrocyte sedimentation rate auto     TSH with free T4 reflex     Comprehensive metabolic panel     EBV Capsid  Antibody IgM     EBV Capsid Antibody IgG     Lyme Disease Geri with reflex to WB Serum     Ehrlichia Anaplasma Sp by PCR     Anaplasma phagocytoph antibody IgG IgM     Parvovirus B19 antibodies IgG IgM     naratriptan (AMERGE) 2.5 MG tablet     nortriptyline (PAMELOR) 25 MG capsule     predniSONE (DELTASONE) 20 MG tablet       AVS given to patient upon discharge today.  Electronically signed by Wendy Woodall PA-C  August 28, 2017  9:22 AM

## 2017-08-23 NOTE — TELEPHONE ENCOUNTER
Reason for Call:  Other call back    Detailed comments: Patient called and states that since she saw Wendy Woodall on 8/15 her headaches have been getting worse and she has had them everyday. She stated she is also having vision issues now. She is wondering what Wendy Woodall thinks she should do now to help. Please advise.     Phone Number Patient can be reached at: Home number on file [phone] (home)    Best Time: any     Can we leave a detailed message on this number? YES    Call taken on 8/23/2017 at 7:18 AM by Rustam Belle

## 2017-08-23 NOTE — TELEPHONE ENCOUNTER
Called pt and informed her that per Wendy we will need to see her in the clinic. We can see her today at 2. She states she has to talk with her boss cause she is at work and will call back. Otherwise we can see her on 8/30 in our  only appt.  Carmen Spears MA

## 2017-08-23 NOTE — NURSING NOTE
"Chief Complaint   Patient presents with     Headache       Initial /70  Pulse 82  Temp 97.8  F (36.6  C) (Tympanic)  Resp 18  Wt 132 lb (59.9 kg)  SpO2 100%  BMI 21.83 kg/m2 Estimated body mass index is 21.83 kg/(m^2) as calculated from the following:    Height as of 8/15/17: 5' 5.2\" (1.656 m).    Weight as of this encounter: 132 lb (59.9 kg).  BP completed using cuff size: joe Spears MA      "

## 2017-08-25 LAB
B BURGDOR IGG+IGM SER QL: 0.07 (ref 0–0.89)
EBV VCA IGG SER QL IA: >8 AI (ref 0–0.8)
EBV VCA IGM SER QL IA: <0.2 AI (ref 0–0.8)

## 2017-08-26 LAB
A PHAGOCYTOPH IGG TITR SER IF: NORMAL {TITER}
A PHAGOCYTOPH IGM TITR SER IF: NORMAL {TITER}
B19V IGG SER IA-ACNC: 5.95 IV
B19V IGM SER IA-ACNC: 0.19 IV

## 2017-08-27 LAB
A PHAGOCYTOPH DNA BLD QL NAA+PROBE: NOT DETECTED
E CHAFFEENSIS DNA BLD QL NAA+PROBE: NOT DETECTED
E EWINGII DNA SPEC QL NAA+PROBE: NOT DETECTED
EHRLICHIA DNA SPEC QL NAA+PROBE: NOT DETECTED

## 2017-08-28 NOTE — PROGRESS NOTES
Aleksandra - your MRI study returned completely normal. Please keep me posted on how you're doing with the preventative medication.

## 2017-08-28 NOTE — PROGRESS NOTES
Aleksandra - your lab studies that indicate a current infection or any type of metabolic problem. You did test positive for parvovirus and Sergio-Barr virus which is mono both of these show that you had an infection years ago, but no current infection. This is not concerning and should not be causing your headaches.

## 2017-08-30 DIAGNOSIS — R51.9 CHRONIC DAILY HEADACHE: ICD-10-CM

## 2017-08-30 DIAGNOSIS — H53.9 VISUAL DISTURBANCE: ICD-10-CM

## 2017-09-07 NOTE — TELEPHONE ENCOUNTER
I'm not sure what you are asking me to do - 12/day? Does a different med need to be ordered?  Electronically signed by Wendy Woodall PA-C  9/7/2017

## 2017-09-11 RX ORDER — NARATRIPTAN 2.5 MG/1
2.5 TABLET ORAL
Qty: 12 TABLET | Refills: 3 | Status: SHIPPED | OUTPATIENT
Start: 2017-09-11 | End: 2018-03-20

## 2017-12-01 ENCOUNTER — HEALTH MAINTENANCE LETTER (OUTPATIENT)
Age: 22
End: 2017-12-01

## 2018-03-20 DIAGNOSIS — R51.9 CHRONIC DAILY HEADACHE: ICD-10-CM

## 2018-03-20 DIAGNOSIS — H53.9 VISUAL DISTURBANCE: ICD-10-CM

## 2018-03-20 NOTE — TELEPHONE ENCOUNTER
"Requested Prescriptions   Pending Prescriptions Disp Refills     naratriptan (AMERGE) 2.5 MG tablet [Pharmacy Med Name: NARATRIPTAN 2.5MG TAB] 9 tablet 5     Sig: TAKE ONE TABLET (2.5 MG) BY MOUTH AT ONSET OF HEADACHE FOR MIRGRAINE MAY REPEAT IN 4 HOURS. MAX 2 TABLETS IN 24 HOURS    Serotonin Agonists Failed    3/20/2018  9:28 AM       Failed - Serotonin Agonist request needs review.    Please review patient's record. If patient has had 8 or more treatments in the past month, please forward to provider.         Passed - Blood pressure under 140/90 in past 12 months    BP Readings from Last 3 Encounters:   08/23/17 110/70   08/15/17 116/70   08/09/17 111/71                Passed - Recent (12 mo) or future (30 days) visit within the authorizing provider's specialty    Patient had office visit in the last 12 months or has a visit in the next 30 days with authorizing provider or within the authorizing provider's specialty.  See \"Patient Info\" tab in inbasket, or \"Choose Columns\" in Meds & Orders section of the refill encounter.           Passed - Patient is age 18 or older       Passed - No active pregnancy on record       Passed - No positive pregnancy test in past 12 months        Last Written Prescription Date:  9/11/17  Last Fill Quantity: 12,  # refills: 3   Last office visit: 8/23/2017 with prescribing provider:     Future Office Visit:      "

## 2018-03-21 NOTE — TELEPHONE ENCOUNTER
Routing refill request to provider for review/approval because:  Patient needs to be seen because:  Was to follow up in 3-4 weeks from LOV: 8/23/17.  T'd up 1 month for provider review.    Will forward to schedulers to schedule patient for OV - follow up on headaches.  Carmen Betancur RN

## 2018-03-22 RX ORDER — NARATRIPTAN 2.5 MG/1
2.5 TABLET ORAL
Qty: 9 TABLET | Refills: 0 | Status: SHIPPED | OUTPATIENT
Start: 2018-03-22 | End: 2018-04-10

## 2018-03-22 NOTE — TELEPHONE ENCOUNTER
Called patient and left message to return call and speak with any .     Thank you  Rustam Belle  Patient Representative

## 2018-03-22 NOTE — TELEPHONE ENCOUNTER
Patient returned call and scheduled an appointment with Wendy Woodall's next available.     Thank you  Rustam Belle  Patient Representative

## 2018-03-22 NOTE — MR AVS SNAPSHOT
After Visit Summary   8/23/2017    Aleksandra Lucas    MRN: [id number]           Patient Information     Date Of Birth          1995        Visit Information        Provider Department      8/23/2017 2:00 PM Wendy Woodall PA-C [facility]        Today's Diagnoses     Chronic daily headache    -  1    Visual disturbance           Follow-ups after your visit        Your next 10 appointments already scheduled     Aug 23, 2017  7:15 PM CDT   MR BRAIN W/O & W CONTRAST with PHMR1   [facility] ([facility])    [address] [place] [id number]   [phone]           Take your medicines as usual, unless your doctor tells you not to. Bring a list of your current medicines to your exam (including vitamins, minerals and over-the-counter drugs).  You will be given intravenous contrast for this exam. To prepare:   The day before your exam, drink extra fluids at least six 8-ounce glasses (unless your doctor tells you to restrict your fluids).   Have a blood test (creatinine test) within 30 days of your exam. Go to your clinic or Diagnostic Imaging Department for this test.  The MRI machine uses a strong magnet. Please wear clothes without metal (snaps, zippers). A sweatsuit works well, or we may give you a hospital gown.  Please remove any body piercings and hair extensions before you arrive. You will also remove watches, jewelry, hairpins, wallets, dentures, partial dental plates and hearing aids. You may wear contact lenses, and you may be able to wear your rings. We have a safe place to keep your personal items, but it is safer to leave them at home.   **IMPORTANT** THE INSTRUCTIONS BELOW ARE ONLY FOR THOSE PATIENTS WHO HAVE BEEN TOLD THEY WILL RECEIVE SEDATION OR GENERAL ANESTHESIA DURING THEIR MRI PROCEDURE:  IF YOU WILL RECEIVE SEDATION (take medicine to help you relax during your exam):   You must get the medicine from your doctor before you  arrive. Bring the medicine to the exam. Do not take it at home.   Arrive one hour early. Bring someone who can take you home after the test. Your medicine will make you sleepy. After the exam, you may not drive, take a bus or take a taxi by yourself.   No eating 8 hours before your exam. You may have clear liquids up until 4 hours before your exam. (Clear liquids include water, clear tea, black coffee and fruit juice without pulp.)  IF YOU WILL RECEIVE ANESTHESIA (be asleep for your exam):   Arrive 1 1/2 hours early. Bring someone who can take you home after the test. You may not drive, take a bus or take a taxi by yourself.   No eating 8 hours before your exam. You may have clear liquids up until 4 hours before your exam. (Clear liquids include water, clear tea, black coffee and fruit juice without pulp.)  Please call the Imaging Department at your exam site with any questions.              Future tests that were ordered for you today     Open Future Orders        Priority Expected Expires Ordered    MR Brain w/o & w Contrast Routine  8/23/2018 8/23/2017            Who to contact     If you have questions or need follow up information about today's clinic visit or your schedule please contact [facility] directly at [phone].  Normal or non-critical lab and imaging results will be communicated to you by Bolt.iohart, letter or phone within 4 business days after the clinic has received the results. If you do not hear from us within 7 days, please contact the clinic through Ogint or phone. If you have a critical or abnormal lab result, we will notify you by phone as soon as possible.  Submit refill requests through BrightFunnel or call your pharmacy and they will forward the refill request to us. Please allow 3 business days for your refill to be completed.          Additional Information About Your Visit        BrightFunnel Information     BrightFunnel lets you send messages to your doctor, view your test results,  "renew your prescriptions, schedule appointments and more. To sign up, go to [email or web address] . Click on \"Log in\" on the left side of the screen, which will take you to the Welcome page. Then click on \"Sign up Now\" on the right side of the page.     You will be asked to enter the access code listed below, as well as some personal information. Please follow the directions to create your username and password.     Your access code is: [email or web address]  Expires: 2017 11:57 PM     Your access code will  in 90 days. If you need help or a new code, please call your [place] clinic or [phone].        Care EveryWhere ID     This is your Care EveryWhere ID. This could be used by other organizations to access your [place] medical records  [id number]        Your Vitals Were     Pulse Temperature Respirations Pulse Oximetry BMI (Body Mass Index)       82 97.8  F (36.6  C) (Tympanic) 18 100% 21.83 kg/m2        Blood Pressure from Last 3 Encounters:   17 110/70   08/15/17 116/70   17 111/71    Weight from Last 3 Encounters:   17 132 lb (59.9 kg)   08/15/17 130 lb (59 kg)   17 130 lb (59 kg)                 Today's Medication Changes          These changes are accurate as of: 17  3:28 PM.  If you have any questions, ask your nurse or doctor.               Start taking these medicines.        Dose/Directions    naratriptan 2.5 MG tablet   Commonly known as:  AMERGE   Used for:  Chronic daily headache, Visual disturbance   Started by:  Wendy Woodall PA-C        Dose:  2.5 mg   Take 1 tablet (2.5 mg) by mouth at onset of headache for migraine May repeat in 4 hours. Max 2 tablets/24 hours.   Quantity:  27 tablet   Refills:  3       nortriptyline 25 MG capsule   Commonly known as:  PAMELOR   Used for:  Chronic daily headache, Visual disturbance   Started by:  Wendy Woodall PA-C        Dose:  25 mg   Take 1 capsule (25 mg) by mouth At Bedtime   Quantity:  90 capsule "   Refills:  3       predniSONE 20 MG tablet   Commonly known as:  DELTASONE   Used for:  Chronic daily headache, Visual disturbance   Started by:  Wendy Woodall PA-C        Dose:  40 mg   Take 2 tablets (40 mg) by mouth daily for 5 days   Quantity:  10 tablet   Refills:  0            Where to get your medicines      These medications were sent to [facility] [address] [place] [address], [facility] [address]     Phone:  [phone]     naratriptan 2.5 MG tablet    nortriptyline 25 MG capsule    predniSONE 20 MG tablet                Primary Care Provider Office Phone # Fax #    Wendy Woodall PA-C [phone]       [address] [place] [address]        Equal Access to Services     DEEP THOMPSON : Hadii aad ku hadasho Soomaali, waaxda luqadaha, qaybta kaalmada adeegyada, waxay lathain jose aquino . So [facility] [phone].    ATENCIÓN: Si habla español, tiene a brito disposición servicios gratuitos de asistencia lingüística. Ll[facility] [phone].    We comply with applicable federal civil rights laws and [place] laws. We do not discriminate on the basis of race, color, national origin, age, disability sex, sexual orientation or gender identity.            Thank you!     Thank you for choosing [facility]  for your care. Our goal is always to provide you with excellent care. Hearing back from our patients is one way we can continue to improve our services. Please take a few minutes to complete the written survey that you may receive in the mail after your visit with us. Thank you!             Your Updated Medication List - Protect others around you: Learn how to safely use, store and throw away your medicines at www.disposemymeds.org.          This list is accurate as of: 8/23/17  3:28 PM.  Always use your most recent med list.                   Brand Name Dispense Instructions for use Diagnosis    minocycline 50 MG  capsule    MINOCIN/DYNACIN    180 capsule    Take 1 capsule (50 mg) by mouth 2 times daily    Acne, unspecified acne type, Intractable migraine without aura and without status migrainosus, Encounter for routine adult health examination without abnormal findings       naratriptan 2.5 MG tablet    AMERGE    27 tablet    Take 1 tablet (2.5 mg) by mouth at onset of headache for migraine May repeat in 4 hours. Max 2 tablets/24 hours.    Chronic daily headache, Visual disturbance       nortriptyline 25 MG capsule    PAMELOR    90 capsule    Take 1 capsule (25 mg) by mouth At Bedtime    Chronic daily headache, Visual disturbance       predniSONE 20 MG tablet    DELTASONE    10 tablet    Take 2 tablets (40 mg) by mouth daily for 5 days    Chronic daily headache, Visual disturbance       TED 13.5 MG Iud   Generic drug:  Levonorgestrel      Inserted 8/15/2015    Intractable migraine without aura and without status migrainosus, Acne, unspecified acne type, Encounter for routine adult health examination without abnormal findings       SUMAtriptan 100 MG tablet    IMITREX    30 tablet    TAKE ONE TABLET BY MOUTH AT ONSET OF HEADACHE FOR MIGRAINE    Intractable migraine without aura and without status migrainosus, Acne, unspecified acne type, Encounter for routine adult health examination without abnormal findings          No

## 2018-04-03 ENCOUNTER — MYC MEDICAL ADVICE (OUTPATIENT)
Dept: FAMILY MEDICINE | Facility: CLINIC | Age: 23
End: 2018-04-03

## 2018-04-03 DIAGNOSIS — Z30.430 ENCOUNTER FOR IUD INSERTION: ICD-10-CM

## 2018-04-04 RX ORDER — MISOPROSTOL 200 UG/1
TABLET ORAL
Qty: 4 TABLET | Refills: 0 | Status: SHIPPED | OUTPATIENT
Start: 2018-04-04 | End: 2018-04-10

## 2018-04-04 NOTE — TELEPHONE ENCOUNTER
Called pt and informed her if she can come in at 215 that day we can do both. She stated that she can and I informed her that we will send in the cytotec. She wants another ant.  Carmen Spears MA

## 2018-04-10 ENCOUNTER — OFFICE VISIT (OUTPATIENT)
Dept: FAMILY MEDICINE | Facility: CLINIC | Age: 23
End: 2018-04-10
Payer: COMMERCIAL

## 2018-04-10 VITALS
RESPIRATION RATE: 16 BRPM | TEMPERATURE: 97.4 F | WEIGHT: 124 LBS | DIASTOLIC BLOOD PRESSURE: 62 MMHG | BODY MASS INDEX: 20.66 KG/M2 | OXYGEN SATURATION: 96 % | HEIGHT: 65 IN | HEART RATE: 88 BPM | SYSTOLIC BLOOD PRESSURE: 110 MMHG

## 2018-04-10 DIAGNOSIS — Z30.433 ENCOUNTER FOR IUD REMOVAL AND REINSERTION: ICD-10-CM

## 2018-04-10 DIAGNOSIS — L70.9 ACNE, UNSPECIFIED ACNE TYPE: ICD-10-CM

## 2018-04-10 DIAGNOSIS — H53.9 VISUAL DISTURBANCE: ICD-10-CM

## 2018-04-10 DIAGNOSIS — G43.019 INTRACTABLE MIGRAINE WITHOUT AURA AND WITHOUT STATUS MIGRAINOSUS: ICD-10-CM

## 2018-04-10 DIAGNOSIS — Z00.00 ENCOUNTER FOR ROUTINE ADULT HEALTH EXAMINATION WITHOUT ABNORMAL FINDINGS: Primary | ICD-10-CM

## 2018-04-10 DIAGNOSIS — R51.9 CHRONIC DAILY HEADACHE: ICD-10-CM

## 2018-04-10 PROBLEM — Z30.430 ENCOUNTER FOR IUD INSERTION: Status: ACTIVE | Noted: 2018-04-10

## 2018-04-10 PROCEDURE — G0145 SCR C/V CYTO,THINLAYER,RESCR: HCPCS | Performed by: PHYSICIAN ASSISTANT

## 2018-04-10 PROCEDURE — 99395 PREV VISIT EST AGE 18-39: CPT | Mod: 25 | Performed by: PHYSICIAN ASSISTANT

## 2018-04-10 PROCEDURE — 87491 CHLMYD TRACH DNA AMP PROBE: CPT | Performed by: PHYSICIAN ASSISTANT

## 2018-04-10 PROCEDURE — 58300 INSERT INTRAUTERINE DEVICE: CPT | Performed by: PHYSICIAN ASSISTANT

## 2018-04-10 PROCEDURE — 58301 REMOVE INTRAUTERINE DEVICE: CPT | Performed by: PHYSICIAN ASSISTANT

## 2018-04-10 PROCEDURE — 87591 N.GONORRHOEAE DNA AMP PROB: CPT | Performed by: PHYSICIAN ASSISTANT

## 2018-04-10 RX ORDER — NARATRIPTAN 2.5 MG/1
2.5 TABLET ORAL
Qty: 18 TABLET | Refills: 3 | Status: SHIPPED | OUTPATIENT
Start: 2018-04-10 | End: 2019-04-08

## 2018-04-10 RX ORDER — NORTRIPTYLINE HCL 25 MG
25 CAPSULE ORAL AT BEDTIME
Qty: 90 CAPSULE | Refills: 3 | Status: SHIPPED | OUTPATIENT
Start: 2018-04-10 | End: 2019-05-06

## 2018-04-10 RX ORDER — MINOCYCLINE HYDROCHLORIDE 50 MG/1
50 CAPSULE ORAL 2 TIMES DAILY
Qty: 180 CAPSULE | Refills: 3 | Status: SHIPPED | OUTPATIENT
Start: 2018-04-10 | End: 2019-01-11 | Stop reason: ALTCHOICE

## 2018-04-10 ASSESSMENT — PAIN SCALES - GENERAL: PAINLEVEL: NO PAIN (0)

## 2018-04-10 ASSESSMENT — ENCOUNTER SYMPTOMS: HEADACHES: 1

## 2018-04-10 NOTE — MR AVS SNAPSHOT
After Visit Summary   4/10/2018    Aleksandra Lucas    MRN: [id number]           Patient Information     Date Of Birth          1995        Visit Information        Provider Department      4/10/2018 2:15 PM Wendy Woodall PA-C [facility]        Today's Diagnoses     Encounter for routine adult health examination without abnormal findings    -  1    Chronic daily headache        Visual disturbance        Acne, unspecified acne type        Intractable migraine without aura and without status migrainosus        Encounter for IUD removal and reinsertion           Follow-ups after your visit        Who to contact     If you have questions or need follow up information about today's clinic visit or your schedule please contact [facility] directly at [phone].  Normal or non-critical lab and imaging results will be communicated to you by MyChart, letter or phone within 4 business days after the clinic has received the results. If you do not hear from us within 7 days, please contact the clinic through Vinehart or phone. If you have a critical or abnormal lab result, we will notify you by phone as soon as possible.  Submit refill requests through [employer] or call your pharmacy and they will forward the refill request to us. Please allow 3 business days for your refill to be completed.          Additional Information About Your Visit        MyChart Information     [employer] gives you secure access to your electronic health record. If you see a primary care provider, you can also send messages to your care team and make appointments. If you have questions, please call your primary care clinic.  If you do not have a primary care provider, please call [phone] and they will assist you.        Care EveryWhere ID     This is your Care EveryWhere ID. This could be used by other organizations to access your [facility] medical records  [id number]        Your Vitals Were   "   Pulse Temperature Respirations Height Last Period Pulse Oximetry    88 97.4  F (36.3  C) (Temporal) 16 5' 5\" (1.651 m) 04/05/2018 (Exact Date) 96%    BMI (Body Mass Index)                   20.63 kg/m2            Blood Pressure from Last 3 Encounters:   04/10/18 110/62   08/23/17 110/70   08/15/17 116/70    Weight from Last 3 Encounters:   04/10/18 124 lb (56.2 kg)   08/23/17 132 lb (59.9 kg)   08/15/17 130 lb (59 kg)              We Performed the Following     Chlamydia trachomatis PCR     INSERTION INTRAUTERINE DEVICE     LEVONORGESTREL-RELEASE INTRAUTERINE CONTRACEPTIVE (TED), 13.5 MG     Neisseria gonorrhoeae PCR     Pap imaged thin layer screen only - recommended age 21 - 24 years     REMOVE INTRAUTERINE DEVICE          Today's Medication Changes          These changes are accurate as of 4/10/18 11:59 PM.  If you have any questions, ask your nurse or doctor.               These medicines have changed or have updated prescriptions.        Dose/Directions    naratriptan 2.5 MG tablet   Commonly known as:  AMERGE   This may have changed:  additional instructions   Used for:  Chronic daily headache, Visual disturbance   Changed by:  Wendy Woodall PA-C        Dose:  2.5 mg   Take 1 tablet (2.5 mg) by mouth at onset of headache for migraine May repeat in 4 hours.  Max of 2 in 24 hours.   Quantity:  18 tablet   Refills:  3         Stop taking these medicines if you haven't already. Please contact your care team if you have questions.     TED 13.5 MG Iud   Generic drug:  Levonorgestrel   Stopped by:  Wendy Woodall PA-C                Where to get your medicines      These medications were sent to [facility] [address] [place] [id number]     Phone:  [phone]     minocycline 50 MG capsule    naratriptan 2.5 MG tablet    nortriptyline 25 MG capsule                Primary Care Provider Office Phone # Fax #    Wendy Woodall PA-C " [phone]       [address] [place] [address]        Equal Access to Services     DEEP THOMPSON : Hadii darell bustillo aida Somonserrat, waaxda luqadaha, qaybta kaalmada segundo, ninfa garcia elvisdmitriy srivastava laAdielheavenly villarreal. So [facility] [phone].    ATENCIÓN: Si habla español, tiene a brito disposición servicios gratuitos de asistencia lingüística. Aime al [phone].    We comply with applicable federal civil rights laws and [place] laws. We do not discriminate on the basis of race, color, national origin, age, disability, sex, sexual orientation, or gender identity.            Thank you!     Thank you for choosing [facility]  for your care. Our goal is always to provide you with excellent care. Hearing back from our patients is one way we can continue to improve our services. Please take a few minutes to complete the written survey that you may receive in the mail after your visit with us. Thank you!             Your Updated Medication List - Protect others around you: Learn how to safely use, store and throw away your medicines at www.disposemymeds.org.          This list is accurate as of 4/10/18 11:59 PM.  Always use your most recent med list.                   Brand Name Dispense Instructions for use Diagnosis    minocycline 50 MG capsule    MINOCIN/DYNACIN    180 capsule    Take 1 capsule (50 mg) by mouth 2 times daily    Acne, unspecified acne type, Intractable migraine without aura and without status migrainosus, Encounter for routine adult health examination without abnormal findings       naratriptan 2.5 MG tablet    AMERGE    18 tablet    Take 1 tablet (2.5 mg) by mouth at onset of headache for migraine May repeat in 4 hours.  Max of 2 in 24 hours.    Chronic daily headache, Visual disturbance       nortriptyline 25 MG capsule    PAMELOR    90 capsule    Take 1 capsule (25 mg) by mouth At Bedtime    Chronic daily headache, Visual disturbance

## 2018-04-10 NOTE — LETTER
[address]   [address]  Tel. [phone] / Fax [phone]    April 10, 2018        Aleksandra Lucas  [address] [address] [place] [id number]        TO WHOM IT MAY CONCERN:    The above patient has been under my care for the majority of her lifetime.  She does have a diagnosis of Leiden Factor V deficiency (positive heterozygous).  She has never had a blood clot in her lifetime.    Sincerely,        Wendy Woodall PA-C

## 2018-04-10 NOTE — NURSING NOTE
"Chief Complaint   Patient presents with     Physical     IUD     Headache       Initial /62  Pulse 88  Temp 97.4  F (36.3  C) (Temporal)  Resp 16  Ht 5' 5\" (1.651 m)  Wt 124 lb (56.2 kg)  LMP 04/05/2018 (Exact Date)  SpO2 96%  BMI 20.63 kg/m2 Estimated body mass index is 20.63 kg/(m^2) as calculated from the following:    Height as of this encounter: 5' 5\" (1.651 m).    Weight as of this encounter: 124 lb (56.2 kg).  BP completed using cuff size: joe Spears MA      "

## 2018-04-10 NOTE — PROGRESS NOTES
SUBJECTIVE:   CC: Aleksandra Lucas is an 22 year old woman who presents for preventive health visit.     Physical   Annual:     Getting at least 3 servings of Calcium per day::  Yes    Bi-annual eye exam::  Yes    Dental care twice a year::  NO    Sleep apnea or symptoms of sleep apnea::  None    Frequency of exercise::  6-7 days/week    Duration of exercise::  45-60 minutes    Taking medications regularly::  Yes    Medication side effects::  None    Additional concerns today::  YES          IUD   Associated symptoms include headaches.   Headache            Migraine Follow-Up    Headaches symptoms:  Stable     Frequency: maybe twice a month      Duration of headaches: couple of hours     Able to do normal daily activities/work with migraines: No -     Rescue/Relief medication:Amerge              Effectiveness: total relief    Preventative medication: nortiptyline     Neurologic complications: No new stroke-like symptoms, loss of vision or speech, numbness or weakness    In the past 4 weeks, how often have you gone to Urgent Care or the emergency room because of your headaches?  0    Also stable on minocycline for acne.  Would like to continue this prescription.    Today's PHQ-2 Score:   PHQ-2 ( 1999 Pf[employer]) 4/10/2018   Q1: Little interest or pleasure in doing things 0   Q2: Feeling down, depressed or hopeless 0   PHQ-2 Score 0   Q1: Little interest or pleasure in doing things Not at all   Q2: Feeling down, depressed or hopeless Not at all   PHQ-2 Score 0       Abuse: Current or Past(Physical, Sexual or Emotional)- No  Do you feel safe in your environment - Yes    Social History   Substance Use Topics     Smoking status: Never Smoker     Smokeless tobacco: Never Used      Comment: no smoking in the home     Alcohol use No     Alcohol Use 4/10/2018   If you drink alcohol do you typically have greater than 3 drinks per day OR greater than 7 drinks per week? No   No flowsheet data found.    Reviewed orders with  patient.  Reviewed health maintenance and updated orders accordingly - Yes  Patient Active Problem List   Diagnosis     Acne     Factor 5 Leiden mutation, heterozygous (H)     Intractable migraine without aura and without status migrainosus     Encounter for IUD insertion     Past Surgical History:   Procedure Laterality Date     OPEN REDUCTION INTERNAL FIXATION ELBOW  8/23/2011    Procedure:OPEN REDUCTION INTERNAL FIXATION ELBOW; open reduction internal fixation left elbow; Surgeon:TOOTIE DRAKE; Location:[facility]       Social History   Substance Use Topics     Smoking status: Never Smoker     Smokeless tobacco: Never Used      Comment: no smoking in the home     Alcohol use No    Mammogram not appropriate for this patient based on age. Family History   Problem Relation Age of Onset     Blood Disease Mother      POSITIVE FACTOR 5     Breast Cancer Maternal Grandmother      Lung Cancer Maternal Grandmother      DIABETES No family hx of      Hypertension No family hx of      Colon Cancer No family hx of      Prostate Cancer No family hx of            Mammogram not appropriate for this patient based on age.    Pertinent mammograms are reviewed under the imaging tab.  History of abnormal Pap smear: NO - age 21-29 PAP every 3 years recommended    Reviewed and updated as needed this visit by clinical staff  Allergies  Meds         Reviewed and updated as needed this visit by Provider            Review of Systems   Neurological: Positive for headaches.     C: NEGATIVE for fever, chills, change in weight  I: NEGATIVE for worrisome rashes, moles or lesions  E: NEGATIVE for vision changes or irritation  ENT: NEGATIVE for ear, mouth and throat problems  R: NEGATIVE for significant cough or SOB  B: NEGATIVE for masses, tenderness or discharge  CV: NEGATIVE for chest pain, palpitations or peripheral edema  GI: NEGATIVE for nausea, abdominal pain, heartburn, or change in bowel habits  : NEGATIVE for unusual urinary or  vaginal symptoms. Periods are regular.  M: NEGATIVE for significant arthralgias or myalgia  N: NEGATIVE for weakness, dizziness or paresthesias  E: NEGATIVE for temperature intolerance, skin/hair changes  H: NEGATIVE for bleeding problems  P: NEGATIVE for changes in mood or affect     OBJECTIVE:   There were no vitals taken for this visit.  Physical Exam  GENERAL: healthy, alert and no distress  EYES: Eyes grossly normal to inspection, PERRL and conjunctivae and sclerae normal  HENT: ear canals and TM's normal, nose and mouth without ulcers or lesions  NECK: no adenopathy, no asymmetry, masses, or scars and thyroid normal to palpation  RESP: lungs clear to auscultation - no rales, rhonchi or wheezes  BREAST: normal without masses, tenderness or nipple discharge and no palpable axillary masses or adenopathy  CV: regular rate and rhythm, normal S1 S2, no S3 or S4, no murmur, click or rub, no peripheral edema and peripheral pulses strong  ABDOMEN: soft, nontender, no hepatosplenomegaly, no masses and bowel sounds normal   (female): normal female external genitalia, normal urethral meatus, vaginal mucosa pink, moist, well rugated, and normal cervix (IUD STRINGS VISUALIZED)  /adnexa/uterus without masses or discharge  MS: no gross musculoskeletal defects noted, no edema  SKIN: no suspicious lesions or rashes  NEURO: Normal strength and tone, mentation intact and speech normal  PSYCH: mentation appears normal, affect normal/bright    ASSESSMENT/PLAN:       ICD-10-CM    1. Encounter for IUD removal and reinsertion Z30.433    2. Chronic daily headache R51 naratriptan (AMERGE) 2.5 MG tablet     nortriptyline (PAMELOR) 25 MG capsule   3. Visual disturbance H53.9 naratriptan (AMERGE) 2.5 MG tablet     nortriptyline (PAMELOR) 25 MG capsule   4. Acne, unspecified acne type L70.9 minocycline (MINOCIN/DYNACIN) 50 MG capsule   5. Intractable migraine without aura and without status migrainosus G43.019 minocycline  "(MINOCIN/DYNACIN) 50 MG capsule   6. Encounter for routine adult health examination without abnormal findings Z00.00 minocycline (MINOCIN/DYNACIN) 50 MG capsule     Neisseria gonorrhoeae PCR     Chlamydia trachomatis PCR     Pap imaged thin layer screen only - recommended age 21 - 24 years       COUNSELING:  Reviewed preventive health counseling, as reflected in patient instructions       Regular exercise       Healthy diet/nutrition       Vision screening       Contraception       Osteoporosis Prevention/Bone Health         reports that she has never smoked. She has never used smokeless tobacco.    Estimated body mass index is 21.83 kg/(m^2) as calculated from the following:    Height as of 8/15/17: 5' 5.2\" (1.656 m).    Weight as of 8/23/17: 132 lb (59.9 kg).     REFILLED YEARLY CONSISTENT PRESCRIPTIONS:      Current Outpatient Prescriptions:      naratriptan (AMERGE) 2.5 MG tablet, Take 1 tablet (2.5 mg) by mouth at onset of headache for migraine May repeat in 4 hours.  Max of 2 in 24 hours., Disp: 18 tablet, Rfl: 3     nortriptyline (PAMELOR) 25 MG capsule, Take 1 capsule (25 mg) by mouth At Bedtime, Disp: 90 capsule, Rfl: 3     minocycline (MINOCIN/DYNACIN) 50 MG capsule, Take 1 capsule (50 mg) by mouth 2 times daily, Disp: 180 capsule, Rfl: 3     Levonorgestrel (TED) 13.5 MG IUD, Inserted 8/15/2015, Disp: , Rfl:     Patient will continue current OTC medications if listed above.     Counseling Resources:  ATP IV Guidelines  Pooled Cohorts Equation Calculator  Breast Cancer Risk Calculator  FRAX Risk Assessment  ICSI Preventive Guidelines  Dietary Guidelines for Americans, 2010  USDA's MyPlate  ASA Prophylaxis  Lung CA Screening    Wendy Woodall PA-C  [facility]    Orders Placed This Encounter     Pap imaged thin layer screen only - recommended age 21 - 24 years     naratriptan (AMERGE) 2.5 MG tablet     nortriptyline (PAMELOR) 25 MG capsule     minocycline (MINOCIN/DYNACIN) 50 MG capsule "       AVS given to patient upon discharge today.  Electronically signed by Wendy Woodall PA-C  April 11, 2018  1:29 PM

## 2018-04-11 NOTE — PROCEDURES
INDICATIONS:                                                      Is a pregnancy test required: No.  Previous Lucy IUD was not  prior to removal today.  Was a consent obtained?  Yes    Aleksandra Lucas is a 22 year old female,No obstetric history on file., Patient's last menstrual period was 2018 (exact date). who presents today for IUD removal and insertion of a new IUD. Her current IUD was placed just under 3 years ago. She has not had problems with the IUD. She requests removal of the IUD because the IUD effectiveness has  and And she would like a new Lucy IUD placed today.  She did take Cytotec per directions prior to this procedure.    The risks, benefits and alternatives of IUD insertion were discussed in detail previously. She also has reviewed the product brochure.  She has elected to go ahead with the removal and insertion of the new IUD today and her questions were answered. Consent was signed. Her LMP began 2018 and was normal in duration and amount of flow.  Today's PHQ-2 Score:   PHQ-2 (  [employer]) 4/10/2018   Q1: Little interest or pleasure in doing things 0   Q2: Feeling down, depressed or hopeless 0   PHQ-2 Score 0   Q1: Little interest or pleasure in doing things Not at all   Q2: Feeling down, depressed or hopeless Not at all   PHQ-2 Score 0       PROCEDURE:                                                      A speculum exam was performed and the cervix was visualized. The IUD string was visualized. Using ring forceps, the string  was grasped and the IUD removed intact.    The pelvic exam revealed normal external genitalia. On bimanual exam the uterus was Anteverted and normal in size with no tenderness present. A speculum was inserted into the vagina and the cervix was visualized. The cervix was prepped with Betadine . The anterior lip of the cervix was grasped with a single toothed tenaculum. The uterus sounded to 7.5 cm. A Lucy IUD was then inserted without  difficulty. The string was cut to 3 cm.  The patient experienced a mild amount of cramping.      POST PROCEDURE:                                                      The patient tolerated the procedure well with minimal discomfort. Patient was discharged in stable condition.    Call if bleeding, pain or fever occur. and Birth control counseling given.     We will check IUD strings on a yearly basis with physicals.    Wendy Woodall PA-C     Orders Placed This Encounter     LEVONORGESTREL-RELEASE INTRAUTERINE CONTRACEPTIVE (TED), 13.5 MG     Pap imaged thin layer screen only - recommended age 21 - 24 years     naratriptan (AMERGE) 2.5 MG tablet     nortriptyline (PAMELOR) 25 MG capsule     minocycline (MINOCIN/DYNACIN) 50 MG capsule       AVS given to patient upon discharge today.  Electronically signed by Wendy Woodall PA-C  April 11, 2018  1:33 PM

## 2018-04-12 LAB
COPATH REPORT: NORMAL
PAP: NORMAL

## 2019-01-11 ENCOUNTER — OFFICE VISIT (OUTPATIENT)
Dept: FAMILY MEDICINE | Facility: CLINIC | Age: 24
End: 2019-01-11
Payer: COMMERCIAL

## 2019-01-11 VITALS
WEIGHT: 122 LBS | HEART RATE: 88 BPM | HEIGHT: 66 IN | DIASTOLIC BLOOD PRESSURE: 62 MMHG | BODY MASS INDEX: 19.61 KG/M2 | OXYGEN SATURATION: 100 % | TEMPERATURE: 99.5 F | RESPIRATION RATE: 10 BRPM | SYSTOLIC BLOOD PRESSURE: 92 MMHG

## 2019-01-11 DIAGNOSIS — Z23 NEED FOR PROPHYLACTIC VACCINATION AND INOCULATION AGAINST INFLUENZA: ICD-10-CM

## 2019-01-11 DIAGNOSIS — I73.00 RAYNAUD'S PHENOMENON WITHOUT GANGRENE: ICD-10-CM

## 2019-01-11 DIAGNOSIS — G43.019 INTRACTABLE MIGRAINE WITHOUT AURA AND WITHOUT STATUS MIGRAINOSUS: Primary | ICD-10-CM

## 2019-01-11 DIAGNOSIS — Z23 ENCOUNTER FOR IMMUNIZATION: ICD-10-CM

## 2019-01-11 DIAGNOSIS — D68.51 FACTOR 5 LEIDEN MUTATION, HETEROZYGOUS (H): ICD-10-CM

## 2019-01-11 PROCEDURE — 90715 TDAP VACCINE 7 YRS/> IM: CPT | Performed by: FAMILY MEDICINE

## 2019-01-11 PROCEDURE — 99214 OFFICE O/P EST MOD 30 MIN: CPT | Mod: 25 | Performed by: FAMILY MEDICINE

## 2019-01-11 PROCEDURE — 90471 IMMUNIZATION ADMIN: CPT | Performed by: FAMILY MEDICINE

## 2019-01-11 PROCEDURE — 90472 IMMUNIZATION ADMIN EACH ADD: CPT | Performed by: FAMILY MEDICINE

## 2019-01-11 PROCEDURE — 90686 IIV4 VACC NO PRSV 0.5 ML IM: CPT | Performed by: FAMILY MEDICINE

## 2019-01-11 RX ORDER — PROPRANOLOL HCL 60 MG
60 CAPSULE, EXTENDED RELEASE 24HR ORAL DAILY
Qty: 90 CAPSULE | Refills: 1 | Status: SHIPPED | OUTPATIENT
Start: 2019-01-11 | End: 2019-05-13

## 2019-01-11 ASSESSMENT — PAIN SCALES - GENERAL: PAINLEVEL: NO PAIN (0)

## 2019-01-11 ASSESSMENT — MIFFLIN-ST. JEOR: SCORE: 1326.17

## 2019-01-11 NOTE — PROGRESS NOTES

## 2019-01-14 NOTE — PROGRESS NOTES
Visit Date:   01/11/2019      SUBJECTIVE:  Aleksandra comes in today to establish care with me as a new provider.  Her previous provider left the clinic.  Her concerns today are regarding her headaches and her birth control and her circulation.  Her main concern is headaches.  She has a history of chronic migraines.  She has had them for years, but she has been having a lot more this past month.  She has a prescription Amerge for a p.r.n. rescue and she uses nortriptyline 25 mg at bedtime for prevention.  It has not seemed to help lately.  She has been using essential oils and cupping on her back time and she takes magnesium and a B vitamin daily.  She is not sure it is B6, B12, or something else, but is supposed to be for headaches.  She also started to wear her glasses more often because she thinks it is related eye strain.  She is not sure what else it could be or what she could use to treat it.  In the past, she was treated with propranolol and that worked well for some time.  She believes she was switched off of that because of playing hockey.  She plays hockey in college and her provider did not want her to be bradycardic or hypotensive using a beta blocker and not be able to exert herself in hockey.  She also has a Lucy IUD in place.  Her last one was put in 04/2018.  She tries to avoid estrogen birth control because she has a history of factor V Leiden mutation.  She is heterozygous for this.  She has not ever had a clot.  Her periods are well controlled on the Lucy.  She does not get very much bleeding.  She is finishing school this year in [employer] and she has a job lined up for next fall in [employer] in [place].  She is also concerned about her bad circulation.  Her feet are always cold and purple.  She is healthy and athletic, but she just does not have good blood flow to her extremities.  She has a history of Raynaud's phenomenon.      Please see Epic for her past medical history, surgical history,  social history, and family history.  These were reviewed today.      REVIEW OF SYSTEMS:  Negative except for noted above for a 7-point review of systems.      OBJECTIVE:   VITAL SIGNS:  Noted.   GENERAL:  The patient is alert, oriented, and in no acute distress.   NECK:  Supple without lymphadenopathy or thyromegaly.   CARDIOVASCULAR:  Regular rate and rhythm without murmur.   LUNGS:  Clear to auscultation bilaterally.   EXTREMITIES:  Normal.  She has had no edema.  She has strong DP and PT pulses bilaterally.  Her feet are slightly cool, but not cold and her color is good.  She has normal capillary refill.      ASSESSMENT:     1.  Intractable migraine without aura and without status migrainosus.   2.  Raynaud's phenomenon.   3.  Factor V Leiden mutation.   4.  Vaccination update.      PLAN:  We talked about different treatments for headaches and migraines in general.  We talked about limiting tension in the muscles and keeping a consistent diet.  We talked about starting her back on propranolol.  I advised her that she could tolerate this through her hockey season, but if she finds it limits her cardiac function then we could hold it on the days when she has competition.  She is willing to try it.  I did put her on the long-acting propranolol, but we could switch her to the short-acting as well and have her do twice daily dosing.  We also talked about the potential role for progesterone and although the systemic effects from the Lucy IUD are supposed to be limited, we could consider taking her off the Sklya and putting her on a ParaGard IUD.  We did talk about the fact her periods would like to return in a heavier stage then they are now.  She is in a stable relationship and does need birth control.  She will give the propranolol a try and let me know how she feels with it during hockey competition.  Also, she was due for a Tdap and influenza vaccine today.  These were given.  We talked about the fact that the  propranolol could potentially also help the Raynaud phenomenon so she is going to keep an eye on this.  We talked about staying active, keeping her general cardiac condition up and maintaining adequate fluid intake.  She is going to schedule a physical in May when she is done with school and will see me sooner penrrique.         DEVIKA GUILLAUME MD             D: 2019   T: 2019   MT: BLAINE      Name:     MARLI ÁLVAREZ   MRN:      [id number]        Account:      [id number]   :      1995           Visit Date:   2019      Document: [id number]

## 2019-04-04 DIAGNOSIS — H53.9 VISUAL DISTURBANCE: ICD-10-CM

## 2019-04-04 DIAGNOSIS — R51.9 CHRONIC DAILY HEADACHE: ICD-10-CM

## 2019-04-08 RX ORDER — NARATRIPTAN 2.5 MG/1
2.5 TABLET ORAL
Qty: 18 TABLET | Refills: 3 | Status: SHIPPED | OUTPATIENT
Start: 2019-04-08 | End: 2019-12-23

## 2019-04-08 NOTE — TELEPHONE ENCOUNTER
"Requested Prescriptions   Pending Prescriptions Disp Refills     naratriptan (AMERGE) 2.5 MG tablet 18 tablet 3     Sig: Take 1 tablet (2.5 mg) by mouth at onset of headache for migraine May repeat in 4 hours.  Max of 2 in 24 hours.   Last Written Prescription Date:  4/10/18  Last Fill Quantity: 18,  # refills: 3   Last office visit: 1/11/2019 with prescribing provider:     Future Office Visit:   Next 5 appointments (look out 90 days)    May 13, 2019  9:30 AM CDT  PHYSICAL with Shayy Orellana MD  [facility] [address] [place] [id number]  [phone]             Serotonin Agonists Failed - 4/4/2019  2:03 PM        Failed - Serotonin Agonist request needs review.     Please review patient's record. If patient has had 8 or more treatments in the past month, please forward to provider.          Failed - Recent (12 mo) or future (30 days) visit within the authorizing provider's specialty     Patient had office visit in the last 12 months or has a visit in the next 30 days with authorizing provider or within the authorizing provider's specialty.  See \"Patient Info\" tab in inbasket, or \"Choose Columns\" in Meds & Orders section of the refill encounter.              Passed - Blood pressure under 140/90 in past 12 months     BP Readings from Last 3 Encounters:   01/11/19 92/62   04/10/18 110/62   08/23/17 110/70                 Passed - Medication is active on med list        Passed - Patient is age 18 or older        Passed - No active pregnancy on record        Passed - No positive pregnancy test in past 12 months        Rx refilled per RN protocol.  Carmen Betancur, SUN, RN    "

## 2019-04-17 ENCOUNTER — TELEPHONE (OUTPATIENT)
Dept: FAMILY MEDICINE | Facility: CLINIC | Age: 24
End: 2019-04-17

## 2019-04-17 NOTE — TELEPHONE ENCOUNTER
Prior Authorization Retail Medication Request    Medication/Dose: naratriptan (AMERGE) 2.5 MG tablet Qty 18  ICD code (if different than what is on RX):    Previously Tried and Failed:    Rationale:      Insurance Name:  [facility]   Insurance ID:  [id number]      Pharmacy Information (if different than what is on RX)  Name:    Phone:

## 2019-04-23 NOTE — TELEPHONE ENCOUNTER
Prior Authorization Not Needed per Insurance    Medication: naratriptan (AMERGE) 2.5 MG tablet Qty 18  Insurance Company:    Expected CoPay:      Pharmacy Filling the Rx: [facility] [address] [place] [address]  Pharmacy Notified: Yes  Patient Notified: Yes      Pharmacy stated they received a paid claim and a PA is not needed.

## 2019-05-06 ENCOUNTER — MYC MEDICAL ADVICE (OUTPATIENT)
Dept: FAMILY MEDICINE | Facility: CLINIC | Age: 24
End: 2019-05-06

## 2019-05-06 DIAGNOSIS — Z30.430 ENCOUNTER FOR IUD INSERTION: ICD-10-CM

## 2019-05-06 DIAGNOSIS — R51.9 CHRONIC DAILY HEADACHE: ICD-10-CM

## 2019-05-06 DIAGNOSIS — H53.9 VISUAL DISTURBANCE: ICD-10-CM

## 2019-05-06 RX ORDER — NORTRIPTYLINE HCL 25 MG
25 CAPSULE ORAL AT BEDTIME
Qty: 90 CAPSULE | Refills: 3 | Status: SHIPPED | OUTPATIENT
Start: 2019-05-06 | End: 2020-04-06

## 2019-05-07 RX ORDER — MISOPROSTOL 200 UG/1
TABLET ORAL
Qty: 4 TABLET | Refills: 0 | Status: SHIPPED | OUTPATIENT
Start: 2019-05-07 | End: 2019-05-13

## 2019-05-07 NOTE — TELEPHONE ENCOUNTER
See mychart note to patient. Please note we're doing Paragard IUD placement at her physical on Monday.   Shayy Orellana MD

## 2019-05-13 ENCOUNTER — OFFICE VISIT (OUTPATIENT)
Dept: FAMILY MEDICINE | Facility: CLINIC | Age: 24
End: 2019-05-13
Payer: COMMERCIAL

## 2019-05-13 VITALS
RESPIRATION RATE: 20 BRPM | TEMPERATURE: 97.9 F | SYSTOLIC BLOOD PRESSURE: 100 MMHG | HEART RATE: 96 BPM | DIASTOLIC BLOOD PRESSURE: 74 MMHG | WEIGHT: 122 LBS | BODY MASS INDEX: 19.84 KG/M2 | OXYGEN SATURATION: 100 %

## 2019-05-13 DIAGNOSIS — Z30.433 ENCOUNTER FOR REMOVAL AND REINSERTION OF INTRAUTERINE CONTRACEPTIVE DEVICE (IUD): ICD-10-CM

## 2019-05-13 DIAGNOSIS — G43.019 INTRACTABLE MIGRAINE WITHOUT AURA AND WITHOUT STATUS MIGRAINOSUS: ICD-10-CM

## 2019-05-13 DIAGNOSIS — Z00.00 ENCOUNTER FOR ROUTINE ADULT HEALTH EXAMINATION WITHOUT ABNORMAL FINDINGS: Primary | ICD-10-CM

## 2019-05-13 PROCEDURE — 99395 PREV VISIT EST AGE 18-39: CPT | Mod: 25 | Performed by: FAMILY MEDICINE

## 2019-05-13 PROCEDURE — 58301 REMOVE INTRAUTERINE DEVICE: CPT | Performed by: FAMILY MEDICINE

## 2019-05-13 PROCEDURE — 58300 INSERT INTRAUTERINE DEVICE: CPT | Performed by: FAMILY MEDICINE

## 2019-05-13 RX ORDER — COPPER 313.4 MG/1
1 INTRAUTERINE DEVICE INTRAUTERINE ONCE
Status: COMPLETED
Start: 2019-05-13 | End: 2019-05-13

## 2019-05-13 RX ORDER — PROPRANOLOL HCL 60 MG
60 CAPSULE, EXTENDED RELEASE 24HR ORAL DAILY
Qty: 90 CAPSULE | Refills: 3 | Status: SHIPPED | OUTPATIENT
Start: 2019-05-13 | End: 2019-12-12

## 2019-05-13 RX ADMIN — COPPER 1 EACH: 313.4 INTRAUTERINE DEVICE INTRAUTERINE at 13:10

## 2019-05-13 ASSESSMENT — ENCOUNTER SYMPTOMS
DYSURIA: 0
CONSTIPATION: 0
HEMATURIA: 0
PARESTHESIAS: 0
FREQUENCY: 0
DIZZINESS: 0
HEMATOCHEZIA: 0
MYALGIAS: 0
HEADACHES: 1
ABDOMINAL PAIN: 0
HEARTBURN: 0
COUGH: 0
NERVOUS/ANXIOUS: 0
EYE PAIN: 0
ARTHRALGIAS: 0
WEAKNESS: 0
FEVER: 0
PALPITATIONS: 0
JOINT SWELLING: 0
BREAST MASS: 0
DIARRHEA: 0
CHILLS: 0
NAUSEA: 0
SORE THROAT: 0
SHORTNESS OF BREATH: 0

## 2019-05-13 ASSESSMENT — PAIN SCALES - GENERAL: PAINLEVEL: NO PAIN (0)

## 2019-05-13 NOTE — PROGRESS NOTES
SUBJECTIVE:   CC: Aleksandra Lucas is an 23 year old woman who presents for preventive health visit.     Healthy Habits:     Getting at least 3 servings of Calcium per day:  Yes    Bi-annual eye exam:  Yes    Dental care twice a year:  Yes    Sleep apnea or symptoms of sleep apnea:  None    Diet:  Regular (no restrictions) and Carbohydrate counting    Frequency of exercise:  2-3 days/week    Duration of exercise:  30-45 minutes    Taking medications regularly:  Yes    Medication side effects:  None    PHQ-2 Total Score: 0    Additional concerns today:  No        Today's PHQ-2 Score:   PHQ-2 ( 1999 Pfizer) 5/13/2019   Q1: Little interest or pleasure in doing things 0   Q2: Feeling down, depressed or hopeless 0   PHQ-2 Score 0   Q1: Little interest or pleasure in doing things Not at all   Q2: Feeling down, depressed or hopeless Not at all   PHQ-2 Score 0       Abuse: Current or Past(Physical, Sexual or Emotional)- No  Do you feel safe in your environment? Yes    Social History     Tobacco Use     Smoking status: Never Smoker     Smokeless tobacco: Never Used     Tobacco comment: no smoking in the home   Substance Use Topics     Alcohol use: No     If you drink alcohol do you typically have >3 drinks per day or >7 drinks per week? No      Alcohol Use 5/13/2019   Prescreen: >3 drinks/day or >7 drinks/week? No   Prescreen: >3 drinks/day or >7 drinks/week? -   No flowsheet data found.    Reviewed orders with patient.  Reviewed health maintenance and updated orders accordingly - Yes  BP Readings from Last 3 Encounters:   05/13/19 100/74   01/11/19 92/62   04/10/18 110/62    Wt Readings from Last 3 Encounters:   05/13/19 55.3 kg (122 lb)   01/11/19 55.3 kg (122 lb)   04/10/18 56.2 kg (124 lb)                  Patient Active Problem List   Diagnosis     Acne     Factor 5 Leiden mutation, heterozygous (H)     Intractable migraine without aura and without status migrainosus     Encounter for IUD insertion     Past Surgical  History:   Procedure Laterality Date     OPEN REDUCTION INTERNAL FIXATION ELBOW  8/23/2011    Procedure:OPEN REDUCTION INTERNAL FIXATION ELBOW; open reduction internal fixation left elbow; Surgeon:TOOTIE DRAKE; Location: OR       Social History     Tobacco Use     Smoking status: Never Smoker     Smokeless tobacco: Never Used     Tobacco comment: no smoking in the home   Substance Use Topics     Alcohol use: No     Family History   Problem Relation Age of Onset     Blood Disease Mother         POSITIVE FACTOR 5     Breast Cancer Maternal Grandmother      Lung Cancer Maternal Grandmother      Diabetes No family hx of      Hypertension No family hx of      Colon Cancer No family hx of      Prostate Cancer No family hx of            Mammogram not appropriate for this patient based on age.    Pertinent mammograms are reviewed under the imaging tab.  History of abnormal Pap smear: NO - age 21-29 PAP every 3 years recommended  PAP / HPV 4/10/2018   PAP NIL     Reviewed and updated as needed this visit by clinical staff  Tobacco  Allergies  Meds  Problems  Med Hx  Surg Hx  Fam Hx         Reviewed and updated as needed this visit by Provider  Tobacco  Allergies  Meds  Problems  Med Hx  Surg Hx  Fam Hx        Past Medical History:   Diagnosis Date     ALLERGIC RHINITIS NOS 6/19/2007    Ragweed, animal dander, grasses, trees     Encounter for IUD insertion 4/10/2018    Lucy IUD - remove by 4/10/2021     Exercise induced bronchospasm 6/19/2007    See respiratory therapy note - has firm dx of asthma exacerbated with exercise.     Factor 5 Leiden mutation, heterozygous (H) 8/2012    POSITIVE MUTATION     Headache(784.0)     diagnosed by neurologist     Pancreatitis 2/18/2010      Past Surgical History:   Procedure Laterality Date     OPEN REDUCTION INTERNAL FIXATION ELBOW  8/23/2011    Procedure:OPEN REDUCTION INTERNAL FIXATION ELBOW; open reduction internal fixation left elbow; Surgeon:TOOTIE DRAKE;  Location:[facility] OR       Review of Systems   Constitutional: Negative for chills and fever.   HENT: Negative for congestion, ear pain, hearing loss and sore throat.    Eyes: Negative for pain and visual disturbance.   Respiratory: Negative for cough and shortness of breath.    Cardiovascular: Negative for chest pain, palpitations and peripheral edema.   Gastrointestinal: Negative for abdominal pain, constipation, diarrhea, heartburn, hematochezia and nausea.   Breasts:  Negative for tenderness, breast mass and discharge.   Genitourinary: Negative for dysuria, frequency, genital sores, hematuria, pelvic pain, urgency, vaginal bleeding and vaginal discharge.   Musculoskeletal: Negative for arthralgias, joint swelling and myalgias.   Skin: Negative for rash.   Neurological: Positive for headaches. Negative for dizziness, weakness and paresthesias.   Psychiatric/Behavioral: Negative for mood changes. The patient is not nervous/anxious.      Her headaches are still pretty bad. She is wondering if the Lucy might be making them worse, would like to try the paragard IUD instead.  Her headaches are random, but still often. She can get a severe migraine and then have a lingering headache for up to 3 weeks.   She sometimes gets vomiting. She is taking the Amitriptyline daily and started the Propranolol as well, not really feeling any different with it.   She has been focused on getting sleep, and drinking enough water. She did get a headache after drinking a beer, not sure if the beer made it worse. She doesn't drink wine.  Isn't really sure about other food/drink triggers.   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      OBJECTIVE:   /74   Pulse 96   Temp 97.9  F (36.6  C) (Temporal)   Resp 20   Wt 55.3 kg (122 lb)   SpO2 100%   BMI 19.84 kg/m    Physical Exam  GENERAL: healthy, alert and no distress  EYES: Eyes grossly normal to inspection, PERRL and conjunctivae and  sclerae normal  HENT: ear canals and TM's normal, nose and mouth without ulcers or lesions  NECK: no adenopathy, no asymmetry, masses, or scars and thyroid normal to palpation  RESP: lungs clear to auscultation - no rales, rhonchi or wheezes  BREAST: deferred due to age  CV: regular rate and rhythm, normal S1 S2, no S3 or S4, no murmur, click or rub, no peripheral edema and peripheral pulses strong  ABDOMEN: soft, nontender, no hepatosplenomegaly, no masses and bowel sounds normal  Pelvic exam:  See separate note.   MS: no gross musculoskeletal defects noted, no edema  SKIN: no suspicious lesions or rashes  NEURO: Normal strength and tone, mentation intact and speech normal  PSYCH: mentation appears normal, affect normal/bright    Diagnostic Test Results:  Orders Placed This Encounter   Procedures     INSERTION INTRAUTERINE DEVICE     REMOVE INTRAUTERINE DEVICE        ASSESSMENT/PLAN:       ICD-10-CM    1. Encounter for routine adult health examination without abnormal findings Z00.00    2. Encounter for removal and reinsertion of intrauterine contraceptive device (IUD) Z30.433 REMOVE INTRAUTERINE DEVICE   3. Intractable migraine without aura and without status migrainosus G43.019 propranolol ER (INDERAL LA) 60 MG 24 hr capsule     Recommend yearly physical, monthly self breast exam.  Pap smear every 3 years.  Refilled her propranolol.  Will continue on that and give the IUD change a few months to see if it works.  If her headaches get better without the progesterone then we might consider weaning off the propranolol.    COUNSELING:  Reviewed preventive health counseling, as reflected in patient instructions  Special attention given to:        Regular exercise       Healthy diet/nutrition       Vision screening       Immunizations    Up to date.        Contraception - see separate note re: IUD exchange.     Estimated body mass index is 19.84 kg/m  as calculated from the following:    Height as of 1/11/19: 1.67 m (5'  "5.75\").    Weight as of this encounter: 55.3 kg (122 lb).         reports that she has never smoked. She has never used smokeless tobacco.      Counseling Resources:  ATP IV Guidelines  Pooled Cohorts Equation Calculator  Breast Cancer Risk Calculator  FRAX Risk Assessment  ICSI Preventive Guidelines  Dietary Guidelines for Americans, 2010  USDA's MyPlate  ASA Prophylaxis  Lung CA Screening    Shayy Orellana MD  [facility]  "

## 2019-05-13 NOTE — PROGRESS NOTES
SUBJECTIVE:    Is a pregnancy test required: No.  Was a consent obtained?  Yes    Subjective: Aleksandra Lucas is a 23 year old who presents for IUD exchange, and desires Paragard type IUD.  She requests removal of the Lucy IUD because she thinks it might be making her headaches worse    Patient has been given the opportunity to ask questions about all forms of birth control, including all options appropriate for Aleksandra Lucas. Discussed that no method of birth control, except abstinence is 100% effective against pregnancy or sexually transmitted infection.     Aleksandra Lucas understands she may have the IUD removed at any time. IUD should be removed by a health care provider and the current IUD will be removed today.    The entire removal and insertion procedure was reviewed with the patient, including care after placement.    Today's PHQ-2 Score:   PHQ-2 ( 1999 Pfizer) 5/13/2019   Q1: Little interest or pleasure in doing things 0   Q2: Feeling down, depressed or hopeless 0   PHQ-2 Score 0   Q1: Little interest or pleasure in doing things Not at all   Q2: Feeling down, depressed or hopeless Not at all   PHQ-2 Score 0       PROCEDURE:    Premedicated with cytotec.  A speculum exam was performed and the cervix was visualized. The IUD string was visualized. Using ring forceps, the string  was grasped and the IUD removed intact.    Under sterile technique, cervix was visualized with speculum and prepped with Betadine solution swab x 3. Tenaculum was placed for stability. The uterus was gently straightened and sounded to 7.5 cm.  Paragard IUD prepared for placement, and IUD inserted according to 's instructions without difficulty or significant resistance, and deployed at the fundus. The strings were visualized and trimmed to 5.0 cm from the external os. Tenaculum was removed and hemostasis noted. Speculum removed.  Patient tolerated procedure well.    Lot # [id number]  Exp: Jan 2025    EBL: minimal     Complications: none      POST PROCEDURE:    Given 's handouts, including when to have IUD removed, list of danger s/sx, side effects and follow up recommended. Encouraged condom use for prevention of STD. Advised to call for any fever, for prolonged or severe pain or bleeding, abnormal vaginal dischage, or unable to palpate strings. She was advised to use pain medications (ibuprofen) as needed for mild to moderate pain. Advised to follow-up in clinic in 4-6 weeks for IUD string check if unable to find strings or as directed by provider. Avoid intercourse for 1 week.     Shayy Orellana MD

## 2019-08-23 ENCOUNTER — MYC MEDICAL ADVICE (OUTPATIENT)
Dept: FAMILY MEDICINE | Facility: CLINIC | Age: 24
End: 2019-08-23

## 2019-08-26 NOTE — TELEPHONE ENCOUNTER
Patient is scheduled on 9/13/19 at 12:45pm.  Maida Reyes CMA ([facility])

## 2019-08-26 NOTE — TELEPHONE ENCOUNTER
See mychart note to patient. Please call and get her in with me for a mole removal, use a same day or  Only. Thanks.   Shayy Orellana MD

## 2019-09-13 ENCOUNTER — OFFICE VISIT (OUTPATIENT)
Dept: FAMILY MEDICINE | Facility: CLINIC | Age: 24
End: 2019-09-13
Payer: COMMERCIAL

## 2019-09-13 VITALS
SYSTOLIC BLOOD PRESSURE: 102 MMHG | DIASTOLIC BLOOD PRESSURE: 72 MMHG | TEMPERATURE: 98.3 F | RESPIRATION RATE: 12 BRPM | HEART RATE: 78 BPM | OXYGEN SATURATION: 100 % | WEIGHT: 123 LBS | BODY MASS INDEX: 20 KG/M2

## 2019-09-13 DIAGNOSIS — L98.9 SKIN LESION: Primary | ICD-10-CM

## 2019-09-13 PROCEDURE — 99207 ZZC NO BILLABLE SERVICE THIS VISIT: CPT | Performed by: FAMILY MEDICINE

## 2019-09-13 RX ORDER — MISOPROSTOL 200 UG/1
TABLET ORAL
Refills: 0 | COMMUNITY
Start: 2019-05-07 | End: 2019-09-13

## 2019-09-13 ASSESSMENT — PAIN SCALES - GENERAL: PAINLEVEL: NO PAIN (0)

## 2019-09-14 NOTE — PROGRESS NOTES
Aleksandra presented for mole removal due to family history of skin cancer in her father and a suspicious lesion on her right shoulder.   However, the lesion has now healed and advised her that since it is gone, we don't need to proceed today.   She is going to follow up if the lesion recurs or any other new lesions.   No charge visit today.   Shayy Orellana MD

## 2019-09-28 ENCOUNTER — HEALTH MAINTENANCE LETTER (OUTPATIENT)
Age: 24
End: 2019-09-28

## 2019-11-25 ENCOUNTER — MYC MEDICAL ADVICE (OUTPATIENT)
Dept: FAMILY MEDICINE | Facility: CLINIC | Age: 24
End: 2019-11-25

## 2019-12-08 ENCOUNTER — MYC MEDICAL ADVICE (OUTPATIENT)
Dept: FAMILY MEDICINE | Facility: CLINIC | Age: 24
End: 2019-12-08

## 2019-12-08 DIAGNOSIS — G43.019 INTRACTABLE MIGRAINE WITHOUT AURA AND WITHOUT STATUS MIGRAINOSUS: ICD-10-CM

## 2019-12-09 NOTE — TELEPHONE ENCOUNTER
Patient is calling again today regarding her migraines.  She would like to look at further steps to take care of this.

## 2019-12-12 RX ORDER — PROPRANOLOL HCL 60 MG
120 CAPSULE, EXTENDED RELEASE 24HR ORAL DAILY
Qty: 180 CAPSULE | Refills: 0 | Status: SHIPPED | OUTPATIENT
Start: 2019-12-12 | End: 2019-12-18

## 2019-12-12 NOTE — TELEPHONE ENCOUNTER
Please see [employer] message below.  Pt scheduled herself an appt thru MyChart not sure if time allotted is appropriate for appt type or if provider can put in a referral without seeing the patient.   Thanks.     Appointment For: Aleksandra Lucas ([id number])   [facility]      1/21/2020   12:45 PM  15 mins.  Shayy Orellana MD [facility]      Patient Comments:   Migraines or referral to neurologist

## 2019-12-12 NOTE — TELEPHONE ENCOUNTER
Message sent to patient to inform of appointment not having enough allotted time to address her ongoing headaches. Appointments offered to schedule with nurse practitioner Rafael Mcfarland.  Will await response to assist with rescheduling appointment. Samantha Walker LPN

## 2019-12-18 RX ORDER — PROPRANOLOL HCL 60 MG
120 CAPSULE, EXTENDED RELEASE 24HR ORAL DAILY
Qty: 180 CAPSULE | Refills: 0 | Status: SHIPPED | OUTPATIENT
Start: 2019-12-18 | End: 2020-04-06

## 2019-12-18 NOTE — TELEPHONE ENCOUNTER
Spoke with patient who states she has not changed to increased dose. She will increase dosage and update if headache occurences do not change.  Advised to stop taking naratriptan and that a prescription will be called in for topamax.per providers notes.      Routing to provider for dosage instruction.  Minerva Pascal RN BSN

## 2019-12-20 RX ORDER — TOPIRAMATE 25 MG/1
TABLET, FILM COATED ORAL
Qty: 60 TABLET | Refills: 1 | Status: SHIPPED | OUTPATIENT
Start: 2019-12-20 | End: 2020-02-27

## 2019-12-21 DIAGNOSIS — R51.9 CHRONIC DAILY HEADACHE: ICD-10-CM

## 2019-12-21 DIAGNOSIS — H53.9 VISUAL DISTURBANCE: ICD-10-CM

## 2019-12-23 RX ORDER — NARATRIPTAN 2.5 MG/1
TABLET ORAL
Qty: 18 TABLET | Refills: 3 | Status: SHIPPED | OUTPATIENT
Start: 2019-12-23 | End: 2021-01-04

## 2019-12-23 NOTE — TELEPHONE ENCOUNTER
"poppy  Last Written Prescription Date:  04/08/2019  Last Fill Quantity: 18,  # refills: 3   Last office visit: 5/13/2019 with prescribing provider:     Future Office Visit:   Next 5 appointments (look out 90 days)    Jan 21, 2020 12:45 PM CST  MyChart Short with Shayy [facility]rellana MD  [facility] [address] [place] [id number] [phone]         Requested Prescriptions   Pending Prescriptions Disp Refills     naratriptan (AMERGE) 2.5 MG tablet [Pharmacy Med Name: NARATRIPTAN 2.5MG TABLETS] 18 tablet 3     Sig: TAKE ONE TABLET BY MOUTH AT ONSET OF HEADACHE. MAY REPEAT IN 4 HOURS AS NEEDED. MAX OF 2 TABLETS IN 24 HOURS.       Serotonin Agonists Failed - 12/21/2019  3:22 AM        Failed - Serotonin Agonist request needs review.     Please review patient's record. If patient has had 8 or more treatments in the past month, please forward to provider.          Passed - Blood pressure under 140/90 in past 12 months     BP Readings from Last 3 Encounters:   09/13/19 102/72   05/13/19 100/74   01/11/19 92/62                 Passed - Recent (12 mo) or future (30 days) visit within the authorizing provider's specialty     Patient has had an office visit with the authorizing provider or a provider within the authorizing providers department within the previous 12 mos or has a future within next 30 days. See \"Patient Info\" tab in inbasket, or \"Choose Columns\" in Meds & Orders section of the refill encounter.              Passed - Medication is active on med list        Passed - Patient is age 18 or older        Passed - No active pregnancy on record        Passed - No positive pregnancy test in past 12 months        Prescription approved per [facility] Refill Protocol.  Minerva Pascal RN BSN    "

## 2020-02-27 ENCOUNTER — MYC REFILL (OUTPATIENT)
Dept: FAMILY MEDICINE | Facility: CLINIC | Age: 25
End: 2020-02-27

## 2020-02-27 DIAGNOSIS — G43.019 INTRACTABLE MIGRAINE WITHOUT AURA AND WITHOUT STATUS MIGRAINOSUS: ICD-10-CM

## 2020-02-27 RX ORDER — TOPIRAMATE 25 MG/1
TABLET, FILM COATED ORAL
Qty: 60 TABLET | Refills: 0 | Status: SHIPPED | OUTPATIENT
Start: 2020-02-27 | End: 2020-04-03

## 2020-02-27 RX ORDER — TOPIRAMATE 25 MG/1
TABLET, FILM COATED ORAL
Qty: 60 TABLET | Refills: 1 | OUTPATIENT
Start: 2020-02-27 | End: 2021-03-05

## 2020-02-27 NOTE — TELEPHONE ENCOUNTER
"Routing refill request to provider for review/approval because:  Labs not current:  CBC, ALT/AST, Platelet  T'd up 1 month for provider review.        Requested Prescriptions   Pending Prescriptions Disp Refills     topiramate (TOPAMAX) 25 MG tablet [Pharmacy Med Name: TOPIRAMATE 25MG TABLETS] 60 tablet 1     Sig: TAKE 1 TABLET(25 MG) BY MOUTH DAILY FOR 7 DAYS THEN TAKE 1 TABLET(25 MG) BY MOUTH TWICE DAILY   Last Written Prescription Date:  12/20/2019  Last Fill Quantity: 60,  # refills: 1   Last office visit: 9/13/2019 with prescribing provider:     Future Office Visit:        Anti-Seizure Meds Protocol  Failed - 2/27/2020  9:52 AM        Failed - Review Authorizing provider's last note.      Refer to last progress notes: confirm request is for original authorizing provider (cannot be through other providers)        Failed - Normal CBC on file in past 26 months     Recent Labs   Lab Test 08/23/17  1530   WBC 5.7   RBC 4.90   HGB 15.5   HCT 44.5              Failed - Normal ALT or AST on file in past 26 months     Recent Labs   Lab Test 08/23/17  1530   ALT 32     Recent Labs   Lab Test 08/23/17  1530   AST 24           Failed - Normal platelet count on file in past 26 months     Recent Labs   Lab Test 08/23/17  1530              Passed - Recent (12 mo) or future (30 days) visit within the authorizing provider's specialty     Patient has had an office visit with the authorizing provider or a provider within the authorizing providers department within the previous 12 mos or has a future within next 30 days. See \"Patient Info\" tab in inbasket, or \"Choose Columns\" in Meds & Orders section of the refill encounter.            Passed - Medication is active on med list        Passed - No active pregnancy on record        Passed - No positive pregnancy test in last 12 months      Penelope Webber RN    "

## 2020-03-31 DIAGNOSIS — G43.019 INTRACTABLE MIGRAINE WITHOUT AURA AND WITHOUT STATUS MIGRAINOSUS: ICD-10-CM

## 2020-03-31 NOTE — TELEPHONE ENCOUNTER
"Requested Prescriptions   Pending Prescriptions Disp Refills     topiramate (TOPAMAX) 25 MG tablet [Pharmacy Med Name: TOPIRAMATE 25MG TABLETS] 60 tablet 0     Sig: TAKE 1 TABLET(25 MG) BY MOUTH DAILY FOR 7 DAYS THEN TAKE 1 TABLET(25 MG) BY MOUTH TWICE DAILY   Last Written Prescription Date:  2/27/20  Last Fill Quantity: 60,  # refills: 0   Last office visit: 9/13/2019 with prescribing provider:  Esteban   Future Office Visit:        Anti-Seizure Meds Protocol  Failed - 3/31/2020  8:50 AM        Failed - Review Authorizing provider's last note.      Refer to last progress notes: confirm request is for original authorizing provider (cannot be through other providers).          Failed - Normal CBC on file in past 26 months     Recent Labs   Lab Test 08/23/17  1530   WBC 5.7   RBC 4.90   HGB 15.5   HCT 44.5                Failed - Normal ALT or AST on file in past 26 months     Recent Labs   Lab Test 08/23/17  1530   ALT 32     Recent Labs   Lab Test 08/23/17  1530   AST 24           Failed - Normal platelet count on file in past 26 months     Recent Labs   Lab Test 08/23/17  1530              Passed - Recent (12 mo) or future (30 days) visit within the authorizing provider's specialty     Patient has had an office visit with the authorizing provider or a provider within the authorizing providers department within the previous 12 mos or has a future within next 30 days. See \"Patient Info\" tab in inbasket, or \"Choose Columns\" in Meds & Orders section of the refill encounter.              Passed - Medication is active on med list        Passed - No active pregnancy on record        Passed - No positive pregnancy test in last 12 months             "

## 2020-03-31 NOTE — TELEPHONE ENCOUNTER
Routing refill request to provider for review/approval because:  Labs not current:  Cbc, alt, ast, plt    Ana Palencia RN on 3/31/2020 at 9:12 AM

## 2020-04-03 RX ORDER — TOPIRAMATE 25 MG/1
25 TABLET, FILM COATED ORAL 2 TIMES DAILY
Qty: 180 TABLET | Refills: 1 | Status: SHIPPED | OUTPATIENT
Start: 2020-04-03 | End: 2021-04-01

## 2020-04-03 NOTE — TELEPHONE ENCOUNTER
I am approving refills for 6 months due to current COVID-19 outbreak related delay in availability of care.  Shayy Orellana MD

## 2020-04-06 ENCOUNTER — VIRTUAL VISIT (OUTPATIENT)
Dept: FAMILY MEDICINE | Facility: CLINIC | Age: 25
End: 2020-04-06
Payer: COMMERCIAL

## 2020-04-06 DIAGNOSIS — D68.51 FACTOR 5 LEIDEN MUTATION, HETEROZYGOUS (H): ICD-10-CM

## 2020-04-06 DIAGNOSIS — R05.9 COUGH: ICD-10-CM

## 2020-04-06 DIAGNOSIS — J45.990 EXERCISE-INDUCED ASTHMA: ICD-10-CM

## 2020-04-06 DIAGNOSIS — I99.9 CIRCULATION PROBLEM: ICD-10-CM

## 2020-04-06 DIAGNOSIS — R51.9 CHRONIC DAILY HEADACHE: Primary | ICD-10-CM

## 2020-04-06 PROCEDURE — 99214 OFFICE O/P EST MOD 30 MIN: CPT | Mod: TEL | Performed by: FAMILY MEDICINE

## 2020-04-06 RX ORDER — ALBUTEROL SULFATE 90 UG/1
2 AEROSOL, METERED RESPIRATORY (INHALATION) EVERY 4 HOURS PRN
Qty: 18 G | Refills: 1 | Status: SHIPPED | OUTPATIENT
Start: 2020-04-06 | End: 2023-06-22

## 2020-04-06 RX ORDER — NORTRIPTYLINE HCL 25 MG
25 CAPSULE ORAL AT BEDTIME
Qty: 90 CAPSULE | Refills: 1 | Status: SHIPPED | OUTPATIENT
Start: 2020-04-06 | End: 2020-09-22

## 2020-04-06 RX ORDER — PROPRANOLOL HCL 60 MG
120 CAPSULE, EXTENDED RELEASE 24HR ORAL DAILY
Qty: 180 CAPSULE | Refills: 1 | Status: SHIPPED | OUTPATIENT
Start: 2020-04-06 | End: 2020-09-22

## 2020-04-06 NOTE — PROGRESS NOTES
"Subjective     Aleksandra Lucas is a 24 year old female who is being evaluated via a billable telephone visit.      Telephone visit performed in lieu of an in-person visit due to current concerns regarding the current COVID-19 situation including social distancing and keeping at risk people safe.  Patient agreed to proceed with this visit due to these circumstances.    The patient has been notified of following:     \"This telephone visit will be conducted via a call between you and your physician/provider. We have found that certain health care needs can be provided without the need for a physical exam.  This service lets us provide the care you need with a short phone conversation.  If a prescription is necessary we can send it directly to your pharmacy.  If lab work is needed we can place an order for that and you can then stop by our lab to have the test done at a later time.    If during the course of the call the physician/provider feels a telephone visit is not appropriate, you will not be charged for this service.\"     Patient has given verbal consent for Telephone visit?  Yes    Aleksandra Lucas complains of   Chief Complaint   Patient presents with     Headache       ALLERGIES  No known allergies    Migraine     Since your last clinic visit, how have your headaches changed?  Improved    How often are you getting headaches or migraines? 2 times a month     Are you able to do normal daily activities when you have a migraine? No    Are you taking rescue/relief medications? (Select all that apply) Other: Liquid advil     How helpful is your rescue/relief medication?  I get some relief    Are you taking any medications to prevent migraines? (Select all that apply)  Inderal    In the past 4 weeks, how often have you gone to urgent care or the emergency room because of your headaches?  0      How many servings of fruits and vegetables do you eat daily?  2-3    On average, how many sweetened beverages do you drink " each day (Examples: soda, juice, sweet tea, etc.  Do NOT count diet or artificially sweetened beverages)?   1    How many days per week do you exercise enough to make your heart beat faster? 3 or less    How many minutes a day do you exercise enough to make your heart beat faster? 30 - 60  How many days per week do you miss taking your medication? 1    What makes it hard for you to take your medications?  remembering to take     Patient would also like to talk to you as well about having circulation issues in her hands and feet worrid it is a side effect of a medication.     Patient Active Problem List   Diagnosis     Acne     Factor 5 Leiden mutation, heterozygous (H)     Intractable migraine without aura and without status migrainosus     Encounter for IUD insertion     Past Surgical History:   Procedure Laterality Date     OPEN REDUCTION INTERNAL FIXATION ELBOW  8/23/2011    Procedure:OPEN REDUCTION INTERNAL FIXATION ELBOW; open reduction internal fixation left elbow; Surgeon:TOOTIE DRAKE; Location: OR       Social History     Tobacco Use     Smoking status: Never Smoker     Smokeless tobacco: Never Used     Tobacco comment: no smoking in the home   Substance Use Topics     Alcohol use: No     Family History   Problem Relation Age of Onset     Blood Disease Mother         POSITIVE FACTOR 5     Breast Cancer Maternal Grandmother      Lung Cancer Maternal Grandmother      Diabetes No family hx of      Hypertension No family hx of      Colon Cancer No family hx of      Prostate Cancer No family hx of            Reviewed and updated as needed this visit by Provider  Tobacco  Allergies  Meds  Problems  Med Hx  Surg Hx  Fam Hx  Soc Hx         Aleksandra has a couple concerns today.  She needs refills on her headache medications.  Overall her headaches are actually better.  Last summer we switched her Lucy IUD for a ParaGard IUD and she feels that has been helpful.  Now she only gets headaches really around  "the time of her menses and they are not as bad as they have been prior.  Her periods are actually regular and light so she is very happy with that.  She did have a spurt 1 month where she had a couple weeks with a couple migraines but otherwise it has been minimal.  She does complain about some pain in her hands and feet which she describes as \"pins-and-needles\" feeling.  She thinks this is related to taking her topiramate because it happens in the morning after taking her topiramate pill and then again at night after taking the topiramate.  Initially when she started the topiramate she was feeling a little foggy in the brain but that has since cleared up.  She tried her Amerge twice but when she did take it she got nauseous and so she has not used it again.  She uses liquid Advil as a rescue for her headaches when she gets them.  It seems to work fairly well.    She is also concerned about her circulation still.  We have discussed this in the past.  She feels like her circulation in her hands and feet is not that great.  Her sister commented that her legs are very purple the last time she was wearing shorts.  She did not notice it but her sister thought it was very concerning.  She is not sure if this is related to her headaches.  She is heterozygous for factor V Leyden mutation.  She has been evaluated in the past with normal CBC, CRP and sed rate.  She has had normal thyroid function in the past.    Her other concern today is possible asthma.  She has had exercise-induced asthma in the past.  Lately when she is been running she develops a cough.  Not sure if it is allergy or asthma related.  She is not currently treating it with anything.    She is living at [facility] working from home at a [employer] firm.  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 She is handling stress well, no other concerns today.     I did refill her headache medications.  We're going to wean her off the " topiramate over 2 weeks as follows: She will take it once daily for 1 week and then every other day for 1 week and then stop.  Hopefully the pins-and-needles feeling will go away.  We will see if her headaches get worse during that time and consider other medication options if it does.  Also I am not sure what is causing her purple legs.  I do not think this is related to factor V Leyden mutation.  It could be vasculopathic, although she has not had any type of lesions or ischemia.  She is not complaining of cold or pain.  I did send a message to Dr. Alfonso asking for guidance on rheumatologic work-up, but I did place some labs for future lab-only visit including a CBC, complement levels, CMP, rheumatoid factor, RUBIN, and then I will add some Sjogren's and scleroderma antibodies if Dr. Alfonso thinks this is helpful.    Also, I encouraged her to try an over-the-counter allergy pill and will also send her an albuterol inhaler for trial with or after running.    Assessment/Plan:    ICD-10-CM    1. Chronic daily headache  R51 propranolol ER (INDERAL LA) 60 MG 24 hr capsule     nortriptyline (PAMELOR) 25 MG capsule   2. Factor 5 Leiden mutation, heterozygous (H)  D68.51    3. Exercise-induced asthma  J45.990 albuterol (PROAIR HFA/PROVENTIL HFA/VENTOLIN HFA) 108 (90 Base) MCG/ACT inhaler   4. Cough  R05 albuterol (PROAIR HFA/PROVENTIL HFA/VENTOLIN HFA) 108 (90 Base) MCG/ACT inhaler   5. Circulation problem  I99.9 CBC with platelets     Comprehensive metabolic panel (BMP + Alb, Alk Phos, ALT, AST, Total. Bili, TP)     UA reflex to Microscopic     Rheumatoid factor     Complement C3     Complement C4     Anti Nuclear Geri IgG by IFA with Reflex     SSB La TJ Antibody IgG     SSA Ro TJ Antibody IgG     Scleroderma Antibody Scl70 TJ IgG        Return in about 3 months (around 7/6/2020) for Lab Work.      Phone call duration:  24 minutes    Shayy Orellana MD

## 2020-04-06 NOTE — PATIENT INSTRUCTIONS
I am glad to hear your headaches are better.  We are going to have you try going off of the topiramate slowly, as follows:  -  For the next week take 1 pill daily.    -  For the week after that take 1 pill every other day.    -  Then after 2 weeks stop it altogether.      If your headaches get worse during this time please let me know and we can consider other medications.    I sent a prescription to the pharmacy for you for an albuterol inhaler.  For your cough that is brought on by running, you can try an allergy pill such as Claritin, Allegra, or Zyrtec, which are all available over-the-counter.   If that does not help you can try this inhaler 1 to 2 puffs every 4 hours as needed.  You might try it after exercise and if you find it very helpful you might use it before exercise.    I put orders in for some lab work for you.  Once this COVID-19 outbreak is over, set up a lab-only visit and will check into some your concerns about your circulation.  If things get worse in the meantime, please contact me sooner.

## 2020-04-09 ENCOUNTER — MYC MEDICAL ADVICE (OUTPATIENT)
Dept: FAMILY MEDICINE | Facility: CLINIC | Age: 25
End: 2020-04-09

## 2020-04-09 NOTE — TELEPHONE ENCOUNTER
Patient is due for ACT/Asthma screening.  Last ACT: 25 on 7/28/14.    ACT was sent on [employer].     Will follow up in 1 week.     Maida Reyes CMA ([facility])

## 2020-04-16 NOTE — TELEPHONE ENCOUNTER
[address] message sent to patient following up.     Maida Reyes CMA ([facility]) 4/16/2020 10:24 AM

## 2020-04-18 ASSESSMENT — ASTHMA QUESTIONNAIRES: ACT_TOTALSCORE: 19

## 2020-05-07 ENCOUNTER — MYC MEDICAL ADVICE (OUTPATIENT)
Dept: FAMILY MEDICINE | Facility: CLINIC | Age: 25
End: 2020-05-07

## 2020-05-07 DIAGNOSIS — I99.9 CIRCULATION PROBLEM: Primary | ICD-10-CM

## 2020-05-20 DIAGNOSIS — R82.71 BACTERIURIA: Primary | ICD-10-CM

## 2020-05-20 DIAGNOSIS — I99.9 CIRCULATION PROBLEM: ICD-10-CM

## 2020-05-20 LAB
ALBUMIN SERPL-MCNC: 3.9 G/DL (ref 3.4–5)
ALBUMIN UR-MCNC: NEGATIVE MG/DL
ALP SERPL-CCNC: 66 U/L (ref 40–150)
ALT SERPL W P-5'-P-CCNC: 21 U/L (ref 0–50)
ANION GAP SERPL CALCULATED.3IONS-SCNC: 4 MMOL/L (ref 3–14)
APPEARANCE UR: ABNORMAL
AST SERPL W P-5'-P-CCNC: 14 U/L (ref 0–45)
BACTERIA #/AREA URNS HPF: ABNORMAL /HPF
BILIRUB SERPL-MCNC: 0.4 MG/DL (ref 0.2–1.3)
BILIRUB UR QL STRIP: NEGATIVE
BUN SERPL-MCNC: 14 MG/DL (ref 7–30)
CALCIUM SERPL-MCNC: 8 MG/DL (ref 8.5–10.1)
CHLORIDE SERPL-SCNC: 110 MMOL/L (ref 94–109)
CO2 SERPL-SCNC: 25 MMOL/L (ref 20–32)
COLOR UR AUTO: YELLOW
CREAT SERPL-MCNC: 0.72 MG/DL (ref 0.52–1.04)
ERYTHROCYTE [DISTWIDTH] IN BLOOD BY AUTOMATED COUNT: 11.9 % (ref 10–15)
GFR SERPL CREATININE-BSD FRML MDRD: >90 ML/MIN/{1.73_M2}
GLUCOSE SERPL-MCNC: 73 MG/DL (ref 70–99)
GLUCOSE UR STRIP-MCNC: NEGATIVE MG/DL
HCT VFR BLD AUTO: 37 % (ref 35–47)
HGB BLD-MCNC: 13 G/DL (ref 11.7–15.7)
HGB UR QL STRIP: NEGATIVE
HYALINE CASTS #/AREA URNS LPF: 1 /LPF (ref 0–2)
KETONES UR STRIP-MCNC: NEGATIVE MG/DL
LEUKOCYTE ESTERASE UR QL STRIP: ABNORMAL
MCH RBC QN AUTO: 32.4 PG (ref 26.5–33)
MCHC RBC AUTO-ENTMCNC: 35.1 G/DL (ref 31.5–36.5)
MCV RBC AUTO: 92 FL (ref 78–100)
MUCOUS THREADS #/AREA URNS LPF: PRESENT /LPF
NITRATE UR QL: NEGATIVE
PH UR STRIP: 6 PH (ref 5–7)
PLATELET # BLD AUTO: 255 10E9/L (ref 150–450)
POTASSIUM SERPL-SCNC: 3.5 MMOL/L (ref 3.4–5.3)
PROT SERPL-MCNC: 7.9 G/DL (ref 6.8–8.8)
RBC # BLD AUTO: 4.01 10E12/L (ref 3.8–5.2)
RBC #/AREA URNS AUTO: 2 /HPF (ref 0–2)
SODIUM SERPL-SCNC: 139 MMOL/L (ref 133–144)
SOURCE: ABNORMAL
SP GR UR STRIP: 1.01 (ref 1–1.03)
SQUAMOUS #/AREA URNS AUTO: 6 /HPF (ref 0–1)
UROBILINOGEN UR STRIP-MCNC: 0 MG/DL (ref 0–2)
WBC # BLD AUTO: 6.1 10E9/L (ref 4–11)
WBC #/AREA URNS AUTO: 6 /HPF (ref 0–5)
WBC CLUMPS #/AREA URNS HPF: PRESENT /HPF

## 2020-05-20 PROCEDURE — 81001 URINALYSIS AUTO W/SCOPE: CPT | Performed by: FAMILY MEDICINE

## 2020-05-20 PROCEDURE — 36415 COLL VENOUS BLD VENIPUNCTURE: CPT | Performed by: FAMILY MEDICINE

## 2020-05-20 PROCEDURE — 86235 NUCLEAR ANTIGEN ANTIBODY: CPT | Performed by: FAMILY MEDICINE

## 2020-05-20 PROCEDURE — 87086 URINE CULTURE/COLONY COUNT: CPT | Performed by: FAMILY MEDICINE

## 2020-05-20 PROCEDURE — 86038 ANTINUCLEAR ANTIBODIES: CPT | Performed by: FAMILY MEDICINE

## 2020-05-20 PROCEDURE — 85027 COMPLETE CBC AUTOMATED: CPT | Performed by: FAMILY MEDICINE

## 2020-05-20 PROCEDURE — 86160 COMPLEMENT ANTIGEN: CPT | Performed by: FAMILY MEDICINE

## 2020-05-20 PROCEDURE — 86431 RHEUMATOID FACTOR QUANT: CPT | Performed by: FAMILY MEDICINE

## 2020-05-20 PROCEDURE — 80053 COMPREHEN METABOLIC PANEL: CPT | Performed by: FAMILY MEDICINE

## 2020-05-20 NOTE — RESULT ENCOUNTER NOTE
Aleksandra, some of your results are done and look ok. You  May have a bladder infection so I am adding a urine culture.  I'll wait until the rest of the results are back to know what to make of them.  Shayy Orellana MD

## 2020-05-21 LAB
ANA SER QL IF: NEGATIVE
C3 SERPL-MCNC: 105 MG/DL (ref 81–157)
C4 SERPL-MCNC: 14 MG/DL (ref 13–39)
ENA SCL70 IGG SER IA-ACNC: <0.2 AI (ref 0–0.9)
ENA SS-A IGG SER IA-ACNC: <0.2 AI (ref 0–0.9)
ENA SS-B IGG SER IA-ACNC: <0.2 AI (ref 0–0.9)
RHEUMATOID FACT SER NEPH-ACNC: <7 IU/ML (ref 0–20)

## 2020-05-21 NOTE — RESULT ENCOUNTER NOTE
Aleksandra, all of your blood tests are within normal. Your urine culture isn't done yet.   NONE of your blood tests show signs of autoimmune disease.  We'll see what the vein tests show.   Shayy Orellana MD

## 2020-05-22 ENCOUNTER — ANCILLARY PROCEDURE (OUTPATIENT)
Dept: ULTRASOUND IMAGING | Facility: CLINIC | Age: 25
End: 2020-05-22
Attending: FAMILY MEDICINE
Payer: COMMERCIAL

## 2020-05-22 DIAGNOSIS — I99.9 CIRCULATION PROBLEM: ICD-10-CM

## 2020-05-22 LAB
BACTERIA SPEC CULT: NO GROWTH
Lab: NORMAL
SPECIMEN SOURCE: NORMAL

## 2020-05-23 NOTE — RESULT ENCOUNTER NOTE
Rich, this is the young athlete I asked you about. Overall this looks good to me with few areas of venous incompetence. However, I don't read these studies often, any feedback on these results?  Thanks for your help.   Shayy Orellana MD

## 2020-07-11 ENCOUNTER — NURSE TRIAGE (OUTPATIENT)
Dept: NURSING | Facility: CLINIC | Age: 25
End: 2020-07-11

## 2020-07-11 ENCOUNTER — APPOINTMENT (OUTPATIENT)
Dept: ULTRASOUND IMAGING | Facility: CLINIC | Age: 25
End: 2020-07-11
Attending: FAMILY MEDICINE
Payer: COMMERCIAL

## 2020-07-11 ENCOUNTER — HOSPITAL ENCOUNTER (EMERGENCY)
Facility: CLINIC | Age: 25
Discharge: HOME | End: 2020-07-11
Attending: FAMILY MEDICINE | Admitting: FAMILY MEDICINE
Payer: COMMERCIAL

## 2020-07-11 ENCOUNTER — TELEPHONE (OUTPATIENT)
Dept: FAMILY MEDICINE | Facility: CLINIC | Age: 25
End: 2020-07-11

## 2020-07-11 VITALS
TEMPERATURE: 97.5 F | BODY MASS INDEX: 20.33 KG/M2 | RESPIRATION RATE: 16 BRPM | DIASTOLIC BLOOD PRESSURE: 80 MMHG | SYSTOLIC BLOOD PRESSURE: 116 MMHG | HEART RATE: 80 BPM | WEIGHT: 125 LBS | OXYGEN SATURATION: 98 %

## 2020-07-11 DIAGNOSIS — R10.32 ABDOMINAL PAIN, LEFT LOWER QUADRANT: ICD-10-CM

## 2020-07-11 LAB
ALBUMIN SERPL-MCNC: 4 G/DL (ref 3.4–5)
ALBUMIN UR-MCNC: NEGATIVE MG/DL
ALP SERPL-CCNC: 75 U/L (ref 40–150)
ALT SERPL W P-5'-P-CCNC: 25 U/L (ref 0–50)
ANION GAP SERPL CALCULATED.3IONS-SCNC: 6 MMOL/L (ref 3–14)
APPEARANCE UR: CLEAR
AST SERPL W P-5'-P-CCNC: 20 U/L (ref 0–45)
BACTERIA #/AREA URNS HPF: ABNORMAL /HPF
BASOPHILS # BLD AUTO: 0 10E9/L (ref 0–0.2)
BASOPHILS NFR BLD AUTO: 0.4 %
BILIRUB SERPL-MCNC: 0.5 MG/DL (ref 0.2–1.3)
BILIRUB UR QL STRIP: NEGATIVE
BUN SERPL-MCNC: 16 MG/DL (ref 7–30)
CALCIUM SERPL-MCNC: 8.5 MG/DL (ref 8.5–10.1)
CHLORIDE SERPL-SCNC: 110 MMOL/L (ref 94–109)
CO2 SERPL-SCNC: 23 MMOL/L (ref 20–32)
COLOR UR AUTO: ABNORMAL
CREAT SERPL-MCNC: 0.76 MG/DL (ref 0.52–1.04)
DIFFERENTIAL METHOD BLD: NORMAL
EOSINOPHIL NFR BLD AUTO: 2 %
ERYTHROCYTE [DISTWIDTH] IN BLOOD BY AUTOMATED COUNT: 11.9 % (ref 10–15)
GFR SERPL CREATININE-BSD FRML MDRD: >90 ML/MIN/{1.73_M2}
GLUCOSE SERPL-MCNC: 80 MG/DL (ref 70–99)
GLUCOSE UR STRIP-MCNC: NEGATIVE MG/DL
HCG UR QL: NEGATIVE
HCT VFR BLD AUTO: 37.4 % (ref 35–47)
HGB BLD-MCNC: 13.1 G/DL (ref 11.7–15.7)
HGB UR QL STRIP: ABNORMAL
IMM GRANULOCYTES # BLD: 0 10E9/L (ref 0–0.4)
IMM GRANULOCYTES NFR BLD: 0.1 %
KETONES UR STRIP-MCNC: NEGATIVE MG/DL
LEUKOCYTE ESTERASE UR QL STRIP: NEGATIVE
LIPASE SERPL-CCNC: 144 U/L (ref 73–393)
LYMPHOCYTES # BLD AUTO: 2.1 10E9/L (ref 0.8–5.3)
LYMPHOCYTES NFR BLD AUTO: 29.1 %
MCH RBC QN AUTO: 31.8 PG (ref 26.5–33)
MCHC RBC AUTO-ENTMCNC: 35 G/DL (ref 31.5–36.5)
MCV RBC AUTO: 91 FL (ref 78–100)
MONOCYTES # BLD AUTO: 0.5 10E9/L (ref 0–1.3)
MONOCYTES NFR BLD AUTO: 6.6 %
MUCOUS THREADS #/AREA URNS LPF: PRESENT /LPF
NEUTROPHILS # BLD AUTO: 4.4 10E9/L (ref 1.6–8.3)
NEUTROPHILS NFR BLD AUTO: 61.8 %
NITRATE UR QL: NEGATIVE
NRBC # BLD AUTO: 0 10*3/UL
NRBC BLD AUTO-RTO: 0 /100
PH UR STRIP: 6 PH (ref 5–7)
PLATELET # BLD AUTO: 240 10E9/L (ref 150–450)
POTASSIUM SERPL-SCNC: 3.5 MMOL/L (ref 3.4–5.3)
PROT SERPL-MCNC: 7.9 G/DL (ref 6.8–8.8)
RBC # BLD AUTO: 4.12 10E12/L (ref 3.8–5.2)
RBC #/AREA URNS AUTO: 7 /HPF (ref 0–2)
SODIUM SERPL-SCNC: 139 MMOL/L (ref 133–144)
SOURCE: ABNORMAL
SP GR UR STRIP: 1.01 (ref 1–1.03)
SQUAMOUS #/AREA URNS AUTO: 1 /HPF (ref 0–1)
UROBILINOGEN UR STRIP-MCNC: 0 MG/DL (ref 0–2)
WBC # BLD AUTO: 7.1 10E9/L (ref 4–11)
WBC #/AREA URNS AUTO: 2 /HPF (ref 0–5)

## 2020-07-11 PROCEDURE — 83690 ASSAY OF LIPASE: CPT | Performed by: FAMILY MEDICINE

## 2020-07-11 PROCEDURE — 76856 US EXAM PELVIC COMPLETE: CPT

## 2020-07-11 PROCEDURE — 99284 EMERGENCY DEPT VISIT MOD MDM: CPT | Mod: 25 | Performed by: FAMILY MEDICINE

## 2020-07-11 PROCEDURE — 80053 COMPREHEN METABOLIC PANEL: CPT | Performed by: FAMILY MEDICINE

## 2020-07-11 PROCEDURE — 99283 EMERGENCY DEPT VISIT LOW MDM: CPT | Mod: Z6 | Performed by: FAMILY MEDICINE

## 2020-07-11 PROCEDURE — 81001 URINALYSIS AUTO W/SCOPE: CPT | Performed by: FAMILY MEDICINE

## 2020-07-11 PROCEDURE — 85025 COMPLETE CBC W/AUTO DIFF WBC: CPT | Performed by: FAMILY MEDICINE

## 2020-07-11 PROCEDURE — 81025 URINE PREGNANCY TEST: CPT | Performed by: FAMILY MEDICINE

## 2020-07-11 NOTE — ED AVS SNAPSHOT
[facility] Emergency Department  [address] [place] [address]  Phone:  [phone]  Fax:  [phone]                                    Aleksandra Lucas   MRN: [id number]    Department:  [facility] Emergency Department   Date of Visit:  7/11/2020           After Visit Summary Signature Page    I have received my discharge instructions, and my questions have been answered. I have discussed any challenges I see with this plan with the nurse or doctor.    ..........................................................................................................................................  Patient/Patient Representative Signature      ..........................................................................................................................................  Patient Representative Print Name and Relationship to Patient    ..................................................               ................................................  Date                                   Time    ..........................................................................................................................................  Reviewed by Signature/Title    ...................................................              ..............................................  Date                                               Time          22EPIC Rev 08/18

## 2020-07-11 NOTE — TELEPHONE ENCOUNTER
Aleksandra reports that she has on and off left sided abdominal pain x 3 days. Pain present at the time of call, rated 7-8/10 for the past 45 minutes.    Also has lower back pain.    COVID 19 Nurse Triage Plan/Patient Instructions    Please be aware that novel coronavirus (COVID-19) may be circulating in the community. If you develop symptoms such as fever, cough, or SOB or if you have concerns about the presence of another infection including coronavirus (COVID-19), please contact your health care provider or visit [email or web address].     Disposition/Instructions    ED Visit recommended. Follow protocol based instructions. Patient plans to go to [place] ED.    Bring Your Own Device:  Please also bring your smart device(s) (smart phones, tablets, laptops) and their charging cables for your personal use and to communicate with your care team during your visit.    Thank you for taking steps to prevent the spread of this virus.  o Limit your contact with others.  o Wear a simple mask to cover your cough.  o Wash your hands well and often.    Resources    [facility]: About COVID-19: [email or web address]    CDC: What to Do If You're Sick: www.cdc.gov/coronavirus/2019-ncov/about/steps-when-sick.html    CDC: Ending Home Isolation: www.cdc.gov/coronavirus/2019-ncov/hcp/disposition-in-home-patients.html     CDC: Caring for Someone: www.cdc.gov/coronavirus/2019-ncov/if-you-are-sick/care-for-someone.html     [facility]: Interim Guidance for Hospital Discharge to Home: www.health.[facility].[place]/diseases/coronavirus/hcp/hospdischarge.pdf    [facility] clinical trials (COVID-19 research studies): [email or web address]-trials     Below are the COVID-19 hotlines at the [place] Department of Health ([facility]). Interpreters are available.   o For health questions: Call 425-411-5594 or 1-123.843.4333 (7 a.m. to 7 p.m.)  o For questions about schools and childcare: Call 674-752-4916 or 1-479.274.6040 (4  a.m. to 7 p.m.)         Dana Trotter RN/[place] Nurse Advisor      Reason for Disposition    [1] SEVERE pain (e.g., excruciating) AND [2] present > 1 hour    Additional Information    Negative: Shock suspected (e.g., cold/pale/clammy skin, too weak to stand, low BP, rapid pulse)    Negative: Difficult to awaken or acting confused (e.g., disoriented, slurred speech)    Negative: Passed out (i.e., lost consciousness, collapsed and was not responding)    Negative: Sounds like a life-threatening emergency to the triager    Protocols used: ABDOMINAL PAIN - FEMALE-A-AH

## 2020-07-12 ASSESSMENT — ENCOUNTER SYMPTOMS
APPETITE CHANGE: 0
NAUSEA: 0
NEUROLOGICAL NEGATIVE: 1
BLOOD IN STOOL: 0
EYES NEGATIVE: 1
MUSCULOSKELETAL NEGATIVE: 1
DYSURIA: 0
ABDOMINAL PAIN: 1
RESPIRATORY NEGATIVE: 1
CARDIOVASCULAR NEGATIVE: 1
VOMITING: 0
FEVER: 0
PSYCHIATRIC NEGATIVE: 1
CONSTIPATION: 0
HEMATOLOGIC/LYMPHATIC NEGATIVE: 1
RECTAL PAIN: 0
POLYPHAGIA: 0
POLYDIPSIA: 0
DIARRHEA: 0
HEMATURIA: 0

## 2020-07-12 NOTE — ED PROVIDER NOTES
History     Chief Complaint   Patient presents with     Abdominal Pain     HPI  Aleksandra Lucas is a 24 year old female who presents the emergency room with her mother secondary concerns of left sided abdominal pain symptoms.  She states that the pain has been intermittent and mostly at night occasionally waking her from sleep but today she noted some pain lasting about an hour or so during the daytime.  She called the nurse phone consult line who suggested she come to the ER for evaluation.  Patient states her pain is now resolved.  She describes it as a rather sharp pain at times mostly in the left lower abdomen but sometimes rating into the left upper abdomen.  She denies any vaginal discharge or bleeding that is abnormal.  She states that she is due for her menstrual cycle any day now.  She denies any pain with urination or hematuria.  She denies decreased appetite or change in her stooling.  Denies any blood in stool.  Denies nausea or vomiting symptoms.  She does not believe she is pregnant.  Denies any recent fall or injury as reason for her pain symptoms.  Denies any history for ovarian cysts.  Has had no history for diverticulitis.    Allergies:  Allergies   Allergen Reactions     No Known Allergies        Problem List:    Patient Active Problem List    Diagnosis Date Noted     Encounter for IUD insertion 04/10/2018     Priority: Medium     Lucy IUD - remove by 4/10/2021       Intractable migraine without aura and without status migrainosus 10/05/2016     Priority: Medium     Factor 5 Leiden mutation, heterozygous (H) 08/01/2012     Priority: Medium     POSITIVE MUTATION       Acne 07/19/2011     Priority: Medium     Back - started oral meds          Past Medical History:    Past Medical History:   Diagnosis Date     ALLERGIC RHINITIS NOS 6/19/2007     Encounter for IUD insertion 4/10/2018     Exercise induced bronchospasm 6/19/2007     Factor 5 Leiden mutation, heterozygous (H) 08/2012      Headache(784.0)      Pancreatitis 2/18/2010       Past Surgical History:    Past Surgical History:   Procedure Laterality Date     OPEN REDUCTION INTERNAL FIXATION ELBOW  8/23/2011    Procedure:OPEN REDUCTION INTERNAL FIXATION ELBOW; open reduction internal fixation left elbow; Surgeon:TOOTIE DRAKE; Location:[facility]       Family History:    Family History   Problem Relation Age of Onset     Blood Disease Mother         POSITIVE FACTOR 5     Breast Cancer Maternal Grandmother      Lung Cancer Maternal Grandmother      Diabetes No family hx of      Hypertension No family hx of      Colon Cancer No family hx of      Prostate Cancer No family hx of        Social History:  Marital Status:  Single [1]  Social History     Tobacco Use     Smoking status: Never Smoker     Smokeless tobacco: Never Used     Tobacco comment: no smoking in the home   Substance Use Topics     Alcohol use: No     Drug use: No        Medications:    albuterol (PROAIR HFA/PROVENTIL HFA/VENTOLIN HFA) 108 (90 Base) MCG/ACT inhaler  naratriptan (AMERGE) 2.5 MG tablet  nortriptyline (PAMELOR) 25 MG capsule  propranolol ER (INDERAL LA) 60 MG 24 hr capsule  topiramate (TOPAMAX) 25 MG tablet          Review of Systems   Constitutional: Negative for appetite change and fever.   HENT: Negative.    Eyes: Negative.    Respiratory: Negative.    Cardiovascular: Negative.    Gastrointestinal: Positive for abdominal pain (Left sided). Negative for blood in stool, constipation, diarrhea, nausea, rectal pain and vomiting.   Endocrine: Negative for polydipsia, polyphagia and polyuria.   Genitourinary: Negative for dysuria, hematuria, vaginal bleeding and vaginal discharge.   Musculoskeletal: Negative.    Skin: Negative.    Neurological: Negative.    Hematological: Negative.    Psychiatric/Behavioral: Negative.    All other systems reviewed and are negative.      Physical Exam   BP: 118/78  Pulse: 80  Heart Rate: 92  Temp: 97.5  F (36.4  C)  Resp: 16  Weight: 56.7  kg (125 lb)  SpO2: 100 %      Physical Exam  Vitals signs and nursing note reviewed.   Constitutional:       General: She is not in acute distress.     Appearance: She is well-developed and normal weight. She is not ill-appearing, toxic-appearing or diaphoretic.   HENT:      Head: Normocephalic and atraumatic.   Eyes:      Extraocular Movements: Extraocular movements intact.      Pupils: Pupils are equal, round, and reactive to light.   Cardiovascular:      Rate and Rhythm: Normal rate.      Heart sounds: Normal heart sounds.   Pulmonary:      Effort: Pulmonary effort is normal.      Breath sounds: Normal breath sounds.   Abdominal:      General: Abdomen is flat. Bowel sounds are normal. There are no signs of injury.      Tenderness: There is no abdominal tenderness. There is no right CVA tenderness or left CVA tenderness.      Hernia: No hernia is present.   Skin:     General: Skin is warm.      Capillary Refill: Capillary refill takes less than 2 seconds.      Findings: No rash.   Neurological:      Mental Status: She is alert and oriented to person, place, and time.   Psychiatric:         Mood and Affect: Mood normal.         Behavior: Behavior normal.         ED Course     ED Course as of Jul 12 0222   Sat Jul 11, 2020 2029 Patient notified that the urine testing appeared unremarkable for abnormality.  We will go ahead and order the blood test as planned.  She continues to be symptom-free at this time.        Procedures               Critical Care time:  none               Results for orders placed or performed during the hospital encounter of 07/11/20 (from the past 24 hour(s))   UA reflex to Microscopic and Culture    Specimen: Midstream Urine   Result Value Ref Range    Color Urine Straw     Appearance Urine Clear     Glucose Urine Negative NEG^Negative mg/dL    Bilirubin Urine Negative NEG^Negative    Ketones Urine Negative NEG^Negative mg/dL    Specific Gravity Urine 1.012 1.003 - 1.035    Blood Urine  Small (A) NEG^Negative    pH Urine 6.0 5.0 - 7.0 pH    Protein Albumin Urine Negative NEG^Negative mg/dL    Urobilinogen mg/dL 0.0 0.0 - 2.0 mg/dL    Nitrite Urine Negative NEG^Negative    Leukocyte Esterase Urine Negative NEG^Negative    Source Midstream Urine     RBC Urine 7 (H) 0 - 2 /HPF    WBC Urine 2 0 - 5 /HPF    Bacteria Urine Few (A) NEG^Negative /HPF    Squamous Epithelial /HPF Urine 1 0 - 1 /HPF    Mucous Urine Present (A) NEG^Negative /LPF   HCG qualitative urine (UPT)   Result Value Ref Range    HCG Qual Urine Negative NEG^Negative   CBC with platelets differential   Result Value Ref Range    WBC 7.1 4.0 - 11.0 10e9/L    RBC Count 4.12 3.8 - 5.2 10e12/L    Hemoglobin 13.1 11.7 - 15.7 g/dL    Hematocrit 37.4 35.0 - 47.0 %    MCV 91 78 - 100 fl    MCH 31.8 26.5 - 33.0 pg    MCHC 35.0 31.5 - 36.5 g/dL    RDW 11.9 10.0 - 15.0 %    Platelet Count 240 150 - 450 10e9/L    Diff Method Automated Method     % Neutrophils 61.8 %    % Lymphocytes 29.1 %    % Monocytes 6.6 %    % Eosinophils 2.0 %    % Basophils 0.4 %    % Immature Granulocytes 0.1 %    Nucleated RBCs 0 0 /100    Absolute Neutrophil 4.4 1.6 - 8.3 10e9/L    Absolute Lymphocytes 2.1 0.8 - 5.3 10e9/L    Absolute Monocytes 0.5 0.0 - 1.3 10e9/L    Absolute Basophils 0.0 0.0 - 0.2 10e9/L    Abs Immature Granulocytes 0.0 0 - 0.4 10e9/L    Absolute Nucleated RBC 0.0    Lipase   Result Value Ref Range    Lipase 144 73 - 393 U/L   Comprehensive metabolic panel   Result Value Ref Range    Sodium 139 133 - 144 mmol/L    Potassium 3.5 3.4 - 5.3 mmol/L    Chloride 110 (H) 94 - 109 mmol/L    Carbon Dioxide 23 20 - 32 mmol/L    Anion Gap 6 3 - 14 mmol/L    Glucose 80 70 - 99 mg/dL    Urea Nitrogen 16 7 - 30 mg/dL    Creatinine 0.76 0.52 - 1.04 mg/dL    GFR Estimate >90 >60 mL/min/[1.73_m2]    GFR Estimate If Black >90 >60 mL/min/[1.73_m2]    Calcium 8.5 8.5 - 10.1 mg/dL    Bilirubin Total 0.5 0.2 - 1.3 mg/dL    Albumin 4.0 3.4 - 5.0 g/dL    Protein Total 7.9 6.8 -  8.8 g/dL    Alkaline Phosphatase 75 40 - 150 U/L    ALT 25 0 - 50 U/L    AST 20 0 - 45 U/L   US Pelvis Cmplt w Transvag & Doppler LmtPel Duplex Limited    Narrative    EXAM: US PELVIS COMPLETE W TRANSVAGINAL AND DOPPLER LIMITED  LOCATION: [facility]  DATE/TIME: 7/11/2020 11:03 PM    INDICATION: Left-sided pelvic pain.  COMPARISON: None.  TECHNIQUE: Transabdominal scans were performed. Endovaginal ultrasound was performed to better visualize the adnexa. Color flow with spectral Doppler and waveform analysis performed.    FINDINGS:    UTERUS: 8.0 x 4.6 x 3.4 cm. Normal in size and position with no masses.    ENDOMETRIUM:  IUD in the central endometrium.    RIGHT OVARY: 3.6 x 3.6 x 3.0 cm. Normal with arterial and venous duplex flow identified.    LEFT OVARY: 3.2 x 3.2 x 2.1 cm. Normal with arterial and venous duplex flow identified.    No significant free fluid.      Impression    IMPRESSION:    1.  IUD in good location. Otherwise negative.             Medications - No data to display    Assessments & Plan (with Medical Decision Making)  24-year-old female to the ER with her mother secondary concerns of left lighted abdominal pain symptoms.  Symptoms have been intermittent over the last several days mostly occurring at night but she had an episode lasting about an hour during the day today.  Patient's exam findings are relatively unremarkable for abnormality.  No suggestion of ovarian torsion or cyst identified on ultrasound.  No free fluid noted.  She is without symptoms currently in the ER.  We discussed possible reasons for the symptoms.  Entertained the possibility of doing CT scan but because of her age and with amount of radiation and lack of current symptoms we will hold on that now unless she gets increasing symptomatology.  Encouraged a high-fiber diet.  Increase fluid intake.  And follow-up in the clinic as needed.     I have reviewed the nursing notes.    I have reviewed the findings,  diagnosis, plan and need for follow up with the patient.       Final diagnoses:   Abdominal pain, left lower quadrant       7/11/2020   [facility] EMERGENCY DEPARTMENT     Curry Swartz,   07/12/20 0223

## 2020-07-12 NOTE — ED TRIAGE NOTES
Pt presents with left sided abdominal pain for the last 3 nights. Pt states it started on 7/8 and the pain was only at night. Pt now having pain during the day, today it lasted for 2 hours and took her breath away and radiated to her back.

## 2020-07-13 NOTE — TELEPHONE ENCOUNTER
I have attempted to contact this patient by phone with the following results: left message to return my call on answering machine.  Hows pt feeling after ED visit? Does she still need an appt?  Shelby Reyna, [facility]

## 2020-07-14 NOTE — TELEPHONE ENCOUNTER
Spoke with patient and she states that she is feeling much better. Does not want an appt at this time. Routing to provider as DIANA.     Emerald Zendejas MA

## 2020-08-27 ENCOUNTER — OFFICE VISIT (OUTPATIENT)
Dept: PODIATRY | Facility: CLINIC | Age: 25
End: 2020-08-27
Payer: COMMERCIAL

## 2020-08-27 VITALS
BODY MASS INDEX: 19.68 KG/M2 | SYSTOLIC BLOOD PRESSURE: 119 MMHG | WEIGHT: 121 LBS | DIASTOLIC BLOOD PRESSURE: 88 MMHG | HEART RATE: 88 BPM

## 2020-08-27 DIAGNOSIS — L60.0 INGROWING NAIL: Primary | ICD-10-CM

## 2020-08-27 PROCEDURE — 99203 OFFICE O/P NEW LOW 30 MIN: CPT | Mod: 25 | Performed by: PODIATRIST

## 2020-08-27 PROCEDURE — 11750 EXCISION NAIL&NAIL MATRIX: CPT | Mod: 51 | Performed by: PODIATRIST

## 2020-08-27 NOTE — LETTER
8/27/2020         RE: Aleksandra Lucas  [address]  [facility] [id number]        Dear Colleague,    Thank you for referring your patient, Aleksandra Lucas, to the [facility]. Please see a copy of my visit note below.    Subjective:    Pt is seen today as a new pt referral w/ the c/c of a painful ingrown right and left great nail lateral border.  This has been problematic for 1 year(s). positivehistory of drainage from the site. This is slowly getting worse.  Aggravated by activity and relieved by rest.  Has tried soaking which has not helped.   denies history of trauma to the area.  Family history of ingrown nails.  Sit down job    ROS:  A 10-point review of systems was performed and is positive for that noted in the HPI and as seen above.  All other areas are negative.          Allergies   Allergen Reactions     No Known Allergies        Current Outpatient Medications   Medication Sig Dispense Refill     albuterol (PROAIR HFA/PROVENTIL HFA/VENTOLIN HFA) 108 (90 Base) MCG/ACT inhaler Inhale 2 puffs into the lungs every 4 hours as needed for shortness of breath / dyspnea, wheezing or other (cough) 18 g 1     naratriptan (AMERGE) 2.5 MG tablet TAKE ONE TABLET BY MOUTH AT ONSET OF HEADACHE. MAY REPEAT IN 4 HOURS AS NEEDED. MAX OF 2 TABLETS IN 24 HOURS. 18 tablet 3     nortriptyline (PAMELOR) 25 MG capsule Take 1 capsule (25 mg) by mouth At Bedtime 90 capsule 1     propranolol ER (INDERAL LA) 60 MG 24 hr capsule Take 2 capsules (120 mg) by mouth daily 180 capsule 1     topiramate (TOPAMAX) 25 MG tablet Take 1 tablet (25 mg) by mouth 2 times daily 180 tablet 1       Patient Active Problem List   Diagnosis     Acne     Factor 5 Leiden mutation, heterozygous (H)     Intractable migraine without aura and without status migrainosus     Encounter for IUD insertion       Past Medical History:   Diagnosis Date     ALLERGIC RHINITIS NOS 6/19/2007    Ragweed, animal dander, grasses, trees     Encounter  for IUD insertion 4/10/2018    Lucy IUD - remove by 4/10/2021     Exercise induced bronchospasm 6/19/2007    See respiratory therapy note - has firm dx of asthma exacerbated with exercise.     Factor 5 Leiden mutation, heterozygous (H) 08/2012    POSITIVE heterozygous MUTATION     Headache(784.0)     diagnosed by neurologist     Pancreatitis 2/18/2010       Past Surgical History:   Procedure Laterality Date     OPEN REDUCTION INTERNAL FIXATION ELBOW  8/23/2011    Procedure:OPEN REDUCTION INTERNAL FIXATION ELBOW; open reduction internal fixation left elbow; Surgeon:TOOTIE DRAKE; Location:[facility] OR       Family History   Problem Relation Age of Onset     Blood Disease Mother         POSITIVE FACTOR 5     Breast Cancer Maternal Grandmother      Lung Cancer Maternal Grandmother      Diabetes No family hx of      Hypertension No family hx of      Colon Cancer No family hx of      Prostate Cancer No family hx of        Social History     Tobacco Use     Smoking status: Never Smoker     Smokeless tobacco: Never Used     Tobacco comment: no smoking in the home   Substance Use Topics     Alcohol use: No         Exam:    Vitals: /88   Pulse 88   Wt 54.9 kg (121 lb)   BMI 19.68 kg/m    BMI: Body mass index is 19.68 kg/m .  Height: Data Unavailable    Constitutional/ general:  Pt is in no apparent distress, appears well-nourished.  Cooperative with history and physical exam.     Psych:  The patient answered questions appropriately.  Normal affect.  Seems to have reasonable expectations, in terms of treatment.     Eyes:  Visual scanning/ tracking without deficit.     Ears:  Response to auditory stimuli is normal.  negative hearing aid devices.  Auricles in proper alignment.     Lymphatic:  Popliteal lymph nodes not enlarged.     Lungs:  Non labored breathing, non labored speech. No cough.  No audible wheezing. Even, quiet breathing.       Vascular:  positive pedal pulses bilaterally for both the DP and PT arteries.   CFT < 3 sec.  negative ankle edema.  positive pedal hair growth.    Neuro:  Alert and oriented x 3. Coordinated gait.  Light touch sensation is intact to the L4, L5, S1 distributions. No obvious deficits.  No evidence of neurological-based weakness, spasticity, or contracture in the lower extremities.     Derm: Normal texture and turgor.  No erythema, ecchymosis, or cyanosis.      Musculoskeletal:    Lower extremity muscle strength is normal.  Patient is ambulatory without an assistive device or brace.  Normal arch with weightbearing.  No forefoot or rear foot deformities noted.   Normal ROM all fore foot and rearfoot joints.  No equinus.  right and left great toe nail lateral border shows soft tissue impingement with localized erythema.   negative active drainage/purulence at this time.  No sinus tracts.  No nailbed masses or exostosis.  No pain with range of motion of IPJ or MTPJ.  No ascending cellulitis.    ASSESSMENT:    Onychocryptosis with paronychia right and left great lateral borders.    Discussed etiology and treatment options in detail w/ the pt.  The potential causes and nature of an ingrown toenail were discussed with the patient.  We reviewed the natural history/prognosis of the condition and potential risks if no treatment is provided.      Treatment options discussed included conservative management (oral antibiotics, soaking of foot, adequate width shoes)  as well as surgical management (partial or total nail removal).  The pros and cons of both forms of treatment were reviewed.  Handout given to patient.      After thorough discussion and answering all questions, the patient elected to have permanent removal of affected nail border.  Risk complications and efficacy discussed with patient.  Obtained consent, used 3cc of 1% lidocaine plain to block right and left great toe.  Sterile prep, then avulsed the affected borders.  No evidence of deep abscess noted.  Phenol was applied times 3 at 3o second  intervals with curettage in between and then alcohol rinse.  Pt tolerated procedure well.  Sterile bandage placed, gave wound care instruction.  Return to clinic prn    Nik Paez DPM DPM, FACFAS      Again, thank you for allowing me to participate in the care of your patient.        Sincerely,        Nik Paez DPM

## 2020-08-27 NOTE — PROGRESS NOTES
Subjective:    Pt is seen today as a new pt referral w/ the c/c of a painful ingrown right and left great nail lateral border.  This has been problematic for 1 year(s). positivehistory of drainage from the site. This is slowly getting worse.  Aggravated by activity and relieved by rest.  Has tried soaking which has not helped.   denies history of trauma to the area.  Family history of ingrown nails.  Sit down job    ROS:  A 10-point review of systems was performed and is positive for that noted in the HPI and as seen above.  All other areas are negative.          Allergies   Allergen Reactions     No Known Allergies        Current Outpatient Medications   Medication Sig Dispense Refill     albuterol (PROAIR HFA/PROVENTIL HFA/VENTOLIN HFA) 108 (90 Base) MCG/ACT inhaler Inhale 2 puffs into the lungs every 4 hours as needed for shortness of breath / dyspnea, wheezing or other (cough) 18 g 1     naratriptan (AMERGE) 2.5 MG tablet TAKE ONE TABLET BY MOUTH AT ONSET OF HEADACHE. MAY REPEAT IN 4 HOURS AS NEEDED. MAX OF 2 TABLETS IN 24 HOURS. 18 tablet 3     nortriptyline (PAMELOR) 25 MG capsule Take 1 capsule (25 mg) by mouth At Bedtime 90 capsule 1     propranolol ER (INDERAL LA) 60 MG 24 hr capsule Take 2 capsules (120 mg) by mouth daily 180 capsule 1     topiramate (TOPAMAX) 25 MG tablet Take 1 tablet (25 mg) by mouth 2 times daily 180 tablet 1       Patient Active Problem List   Diagnosis     Acne     Factor 5 Leiden mutation, heterozygous (H)     Intractable migraine without aura and without status migrainosus     Encounter for IUD insertion       Past Medical History:   Diagnosis Date     ALLERGIC RHINITIS NOS 6/19/2007    Ragweed, animal dander, grasses, trees     Encounter for IUD insertion 4/10/2018    Lucy IUD - remove by 4/10/2021     Exercise induced bronchospasm 6/19/2007    See respiratory therapy note - has firm dx of asthma exacerbated with exercise.     Factor 5 Leiden mutation, heterozygous (H) 08/2012     POSITIVE heterozygous MUTATION     Headache(784.0)     diagnosed by neurologist     Pancreatitis 2/18/2010       Past Surgical History:   Procedure Laterality Date     OPEN REDUCTION INTERNAL FIXATION ELBOW  8/23/2011    Procedure:OPEN REDUCTION INTERNAL FIXATION ELBOW; open reduction internal fixation left elbow; Surgeon:TOOTIE DRAKE; Location:[facility] OR       Family History   Problem Relation Age of Onset     Blood Disease Mother         POSITIVE FACTOR 5     Breast Cancer Maternal Grandmother      Lung Cancer Maternal Grandmother      Diabetes No family hx of      Hypertension No family hx of      Colon Cancer No family hx of      Prostate Cancer No family hx of        Social History     Tobacco Use     Smoking status: Never Smoker     Smokeless tobacco: Never Used     Tobacco comment: no smoking in the home   Substance Use Topics     Alcohol use: No         Exam:    Vitals: /88   Pulse 88   Wt 54.9 kg (121 lb)   BMI 19.68 kg/m    BMI: Body mass index is 19.68 kg/m .  Height: Data Unavailable    Constitutional/ general:  Pt is in no apparent distress, appears well-nourished.  Cooperative with history and physical exam.     Psych:  The patient answered questions appropriately.  Normal affect.  Seems to have reasonable expectations, in terms of treatment.     Eyes:  Visual scanning/ tracking without deficit.     Ears:  Response to auditory stimuli is normal.  negative hearing aid devices.  Auricles in proper alignment.     Lymphatic:  Popliteal lymph nodes not enlarged.     Lungs:  Non labored breathing, non labored speech. No cough.  No audible wheezing. Even, quiet breathing.       Vascular:  positive pedal pulses bilaterally for both the DP and PT arteries.  CFT < 3 sec.  negative ankle edema.  positive pedal hair growth.    Neuro:  Alert and oriented x 3. Coordinated gait.  Light touch sensation is intact to the L4, L5, S1 distributions. No obvious deficits.  No evidence of neurological-based weakness,  spasticity, or contracture in the lower extremities.     Derm: Normal texture and turgor.  No erythema, ecchymosis, or cyanosis.      Musculoskeletal:    Lower extremity muscle strength is normal.  Patient is ambulatory without an assistive device or brace.  Normal arch with weightbearing.  No forefoot or rear foot deformities noted.   Normal ROM all fore foot and rearfoot joints.  No equinus.  right and left great toe nail lateral border shows soft tissue impingement with localized erythema.   negative active drainage/purulence at this time.  No sinus tracts.  No nailbed masses or exostosis.  No pain with range of motion of IPJ or MTPJ.  No ascending cellulitis.    ASSESSMENT:    Onychocryptosis with paronychia right and left great lateral borders.    Discussed etiology and treatment options in detail w/ the pt.  The potential causes and nature of an ingrown toenail were discussed with the patient.  We reviewed the natural history/prognosis of the condition and potential risks if no treatment is provided.      Treatment options discussed included conservative management (oral antibiotics, soaking of foot, adequate width shoes)  as well as surgical management (partial or total nail removal).  The pros and cons of both forms of treatment were reviewed.  Handout given to patient.      After thorough discussion and answering all questions, the patient elected to have permanent removal of affected nail border.  Risk complications and efficacy discussed with patient.  Obtained consent, used 3cc of 1% lidocaine plain to block right and left great toe.  Sterile prep, then avulsed the affected borders.  No evidence of deep abscess noted.  Phenol was applied times 3 at 3o second intervals with curettage in between and then alcohol rinse.  Pt tolerated procedure well.  Sterile bandage placed, gave wound care instruction.  Return to clinic prn    Nik Paez DPM DPM, FACFAS

## 2020-09-16 ENCOUNTER — MYC MEDICAL ADVICE (OUTPATIENT)
Dept: FAMILY MEDICINE | Facility: CLINIC | Age: 25
End: 2020-09-16

## 2020-09-16 NOTE — TELEPHONE ENCOUNTER
Patient is informed she should be seen for these new symptoms.  She is informed to call to schedule an appointment with a covering provider as PCP is out of clinic.  Closing this encounter.  SU DollN, RN

## 2020-09-20 DIAGNOSIS — R51.9 CHRONIC DAILY HEADACHE: ICD-10-CM

## 2020-09-22 RX ORDER — PROPRANOLOL HCL 60 MG
CAPSULE, EXTENDED RELEASE 24HR ORAL
Qty: 180 CAPSULE | Refills: 1 | Status: SHIPPED | OUTPATIENT
Start: 2020-09-22 | End: 2021-04-01

## 2020-09-22 RX ORDER — NORTRIPTYLINE HCL 25 MG
CAPSULE ORAL
Qty: 90 CAPSULE | Refills: 1 | Status: SHIPPED | OUTPATIENT
Start: 2020-09-22 | End: 2021-04-01

## 2020-09-29 ENCOUNTER — TRANSFERRED RECORDS (OUTPATIENT)
Dept: HEALTH INFORMATION MANAGEMENT | Facility: CLINIC | Age: 25
End: 2020-09-29

## 2020-12-27 DIAGNOSIS — R51.9 CHRONIC DAILY HEADACHE: ICD-10-CM

## 2020-12-28 RX ORDER — NORTRIPTYLINE HCL 25 MG
CAPSULE ORAL
Qty: 90 CAPSULE | Refills: 1 | OUTPATIENT
Start: 2020-12-28

## 2020-12-28 NOTE — TELEPHONE ENCOUNTER
"  Requested Prescriptions   Pending Prescriptions Disp Refills     nortriptyline (PAMELOR) 25 MG capsule [Pharmacy Med Name: NORTRIPTYLINE 25MG CAPSULES] 90 capsule 1     Sig: TAKE 1 CAPSULE(25 MG) BY MOUTH AT BEDTIME   4/6/2020    Tricyclic Agents ( Annual appt and no PHQ9) Passed - 12/27/2020  3:26 AM        Passed - Blood Pressure under 140/90 in past 12 mos     BP Readings from Last 3 Encounters:   08/27/20 119/88   07/11/20 116/80   09/13/19 102/72           Passed - Recent (12 mo) or future (30 days) visit within authorizing provider's specialty     Patient has had an office visit with the authorizing provider or a provider within the authorizing providers department within the previous 12 mos or has a future within next 30 days. See \"Patient Info\" tab in inbasket, or \"Choose Columns\" in Meds & Orders section of the refill encounter.              Passed - Medication is active on med list        Passed - Patient is age 18 or older        Passed - Patient is not pregnant        Passed - No positive pregnancy test on record in past 12 mos         Denied  Sent 9/22/2020 for 3 months and 1 refill  Penelope Webber RN      "

## 2020-12-31 DIAGNOSIS — H53.9 VISUAL DISTURBANCE: ICD-10-CM

## 2020-12-31 DIAGNOSIS — R51.9 CHRONIC DAILY HEADACHE: ICD-10-CM

## 2021-01-01 DIAGNOSIS — H53.9 VISUAL DISTURBANCE: ICD-10-CM

## 2021-01-01 DIAGNOSIS — R51.9 CHRONIC DAILY HEADACHE: ICD-10-CM

## 2021-01-02 DIAGNOSIS — R51.9 CHRONIC DAILY HEADACHE: ICD-10-CM

## 2021-01-02 DIAGNOSIS — H53.9 VISUAL DISTURBANCE: ICD-10-CM

## 2021-01-03 DIAGNOSIS — H53.9 VISUAL DISTURBANCE: ICD-10-CM

## 2021-01-03 DIAGNOSIS — R51.9 CHRONIC DAILY HEADACHE: ICD-10-CM

## 2021-01-04 RX ORDER — NARATRIPTAN 2.5 MG/1
TABLET ORAL
Qty: 18 TABLET | Refills: 3 | OUTPATIENT
Start: 2021-01-04

## 2021-01-04 RX ORDER — NARATRIPTAN 2.5 MG/1
TABLET ORAL
Qty: 18 TABLET | Refills: 0 | Status: SHIPPED | OUTPATIENT
Start: 2021-01-04 | End: 2021-04-01

## 2021-01-04 NOTE — TELEPHONE ENCOUNTER
"  Requested Prescriptions   Pending Prescriptions Disp Refills     naratriptan (AMERGE) 2.5 MG tablet [Pharmacy Med Name: NARATRIPTAN 2.5MG TABLETS] 18 tablet 3     Sig: TAKE 1 TABLET BY MOUTH AT ONSET OF HEADACHE. MAY REPEAT IN 4 HOURS AS NEEDED. MAX OF 2 TABLETS IN 24 HOURS   Last Written Prescription Date:  2/23/2019  Last Fill Quantity: 18,  # refills: 3   Last office visit: 4/6/2020 with prescribing provider:     Future Office Visit:        Serotonin Agonists Failed - 12/31/2020  3:28 AM        Failed - Serotonin Agonist request needs review.     Please review patient's record. If patient has had 8 or more treatments in the past month, please forward to provider.          Passed - Blood pressure under 140/90 in past 12 months     BP Readings from Last 3 Encounters:   08/27/20 119/88   07/11/20 116/80   09/13/19 102/72           Passed - Recent (12 mo) or future (30 days) visit within the authorizing provider's specialty     Patient has had an office visit with the authorizing provider or a provider within the authorizing providers department within the previous 12 mos or has a future within next 30 days. See \"Patient Info\" tab in inbasket, or \"Choose Columns\" in Meds & Orders section of the refill encounter.            Passed - Medication is active on med list        Passed - Patient is age 18 or older        Passed - No active pregnancy on record        Passed - No positive pregnancy test in past 12 months         Prescription approved per [facility] Refill [facility].  Penelope Webber RN      "

## 2021-01-05 RX ORDER — NARATRIPTAN 2.5 MG/1
TABLET ORAL
Qty: 18 TABLET | Refills: 3 | OUTPATIENT
Start: 2021-01-05

## 2021-01-05 NOTE — TELEPHONE ENCOUNTER
"Denied  Duplicate  Sent 1/4/21 to this pharmacy  Requested Prescriptions   Pending Prescriptions Disp Refills     naratriptan (AMERGE) 2.5 MG tablet [Pharmacy Med Name: NARATRIPTAN 2.5MG TABLETS] 18 tablet 3     Sig: TAKE 1 TABLET BY MOUTH AT ONSET OF HEADACHE. MAY REPEAT IN 4 HOURS AS NEEDED. MAX OF 2 TABLETS IN 24 HOURS   Last Written Prescription Date:  1/4/2021  Last Fill Quantity: 18,  # refills: 0   Last office visit: 4/6/2020 with prescribing provider:     Future Office Visit:        Serotonin Agonists Failed - 1/3/2021  3:26 AM        Failed - Serotonin Agonist request needs review.     Please review patient's record. If patient has had 8 or more treatments in the past month, please forward to provider.          Passed - Blood pressure under 140/90 in past 12 months     BP Readings from Last 3 Encounters:   08/27/20 119/88   07/11/20 116/80   09/13/19 102/72           Passed - Recent (12 mo) or future (30 days) visit within the authorizing provider's specialty     Patient has had an office visit with the authorizing provider or a provider within the authorizing providers department within the previous 12 mos or has a future within next 30 days. See \"Patient Info\" tab in inbasket, or \"Choose Columns\" in Meds & Orders section of the refill encounter.              Passed - Medication is active on med list        Passed - Patient is age 18 or older        Passed - No active pregnancy on record        Passed - No positive pregnancy test in past 12 months         Penelope Webber RN    "

## 2021-01-09 ENCOUNTER — HEALTH MAINTENANCE LETTER (OUTPATIENT)
Age: 26
End: 2021-01-09

## 2021-03-31 NOTE — PROGRESS NOTES
"  Luis Lake is a 25 year old who presents for the following health issues    HPI     Circulation problem- feet turn blue with sitting or standing- feels like pins and needles x 5 years   Patient has been seen by hematology/oncology for this in the past. She has also had ultrasound evaluation which have been normal. She does have factor 5 but has never had any clots. Symptoms have really not changed or worsened.     Anxiety - Patient reports this is something she has noticed really over the last 1 year. She states she experiences high anxiety in social settings. Feels her heart racing, feels clammy, feels like she cannot talk. Does not seem to occur when she is at home or with her family. Would like to consider medication to help on an as needed basis.     Arms become numb after raising them for a couple minutes- ? Thoracic outlet syndrome   Patient states she never really thought much of this until her friend and her friend's dad were talking about being diagnosed with thoracic outlet syndrome. Notices tingling in her hands if she holds her arms above her head for more than a couple minutes. Denies any weakness, numbness or paresthesias otherwise. No trouble washing her hair or doing her make-up. No neck pain. Does have chronic migraines.     Answers for HPI/ROS submitted by the patient on 4/1/2021   JUANCARLOS 7 TOTAL SCORE: 12    Review of Systems   Constitutional, HEENT, cardiovascular, pulmonary, GI, , musculoskeletal, neuro, skin, endocrine and psych systems are negative, except as otherwise noted.      Objective    /84   Pulse 80   Temp 98  F (36.7  C) (Temporal)   Resp 16   Ht 1.676 m (5' 6\")   Wt 53.5 kg (118 lb)   LMP 03/25/2021 (Approximate)   BMI 19.05 kg/m    Body mass index is 19.05 kg/m .  Physical Exam   GENERAL: healthy, alert and no distress  EYES: Eyes grossly normal to inspection, PERRL and conjunctivae and sclerae normal  HENT: ear canals and TM's normal, nose and mouth without " ulcers or lesions  NECK: no adenopathy, no asymmetry, masses, or scars and thyroid normal to palpation  RESP: lungs clear to auscultation - no rales, rhonchi or wheezes  CV: regular rate and rhythm, normal S1 S2, no S3 or S4, no murmur, click or rub, no peripheral edema and peripheral pulses strong  ABDOMEN: soft, nontender, no hepatosplenomegaly, no masses and bowel sounds normal  MS: bilateral feet with purple-blue discoloration present. Distal pulses intact. No pain with palpation. Full ROM.   SKIN: no suspicious lesions or rashes  NEURO: Normal strength and tone, sensory exam grossly normal, mentation intact and cranial nerves 2-12 intact  PSYCH: mentation appears normal, affect normal/bright and anxious     Assessment & Plan     Situational anxiety  Patient with anxiety in social situations only. She would like to try an as needed medication for this. Options discussed. She is on long acting Propranolol for her migraines and well tolerated this. She would like to try a small dose of short acting when needed.   - propranolol (INDERAL) 20 MG tablet; Take 1 tablet (20 mg) by mouth daily as needed (anxiety)    Factor 5 Leiden mutation, heterozygous (H)  Patient has met with hematology and understands clotting risk and possible side effects.     Chronic daily headache  Stable on current medications.   - naratriptan (AMERGE) 2.5 MG tablet; Take 1 tablet (2.5 mg) by mouth at onset of headache for migraine  - nortriptyline (PAMELOR) 25 MG capsule; Take 1 capsule (25 mg) by mouth At Bedtime  - propranolol ER (INDERAL LA) 60 MG 24 hr capsule; Take 2 capsules (120 mg) by mouth daily    Visual disturbance  - naratriptan (AMERGE) 2.5 MG tablet; Take 1 tablet (2.5 mg) by mouth at onset of headache for migraine    Intractable migraine without aura and without status migrainosus  - topiramate (TOPAMAX) 25 MG tablet; Take 1 tablet (25 mg) by mouth daily    Raynaud's disease without gangrene  Discussed etiology today. Discussed  if symptoms become more bothersome could try a calcium channel blocker but would need to monitor for hypotension upon starting as also takes a beta-blocker.    The patient indicates understanding of these issues and agrees with the plan.    Monica An PA-C  [facility]

## 2021-04-01 ENCOUNTER — OFFICE VISIT (OUTPATIENT)
Dept: FAMILY MEDICINE | Facility: CLINIC | Age: 26
End: 2021-04-01
Payer: COMMERCIAL

## 2021-04-01 VITALS
DIASTOLIC BLOOD PRESSURE: 84 MMHG | WEIGHT: 118 LBS | SYSTOLIC BLOOD PRESSURE: 130 MMHG | BODY MASS INDEX: 18.96 KG/M2 | HEIGHT: 66 IN | TEMPERATURE: 98 F | HEART RATE: 80 BPM | RESPIRATION RATE: 16 BRPM

## 2021-04-01 DIAGNOSIS — G43.019 INTRACTABLE MIGRAINE WITHOUT AURA AND WITHOUT STATUS MIGRAINOSUS: ICD-10-CM

## 2021-04-01 DIAGNOSIS — I73.00 RAYNAUD'S DISEASE WITHOUT GANGRENE: ICD-10-CM

## 2021-04-01 DIAGNOSIS — R51.9 CHRONIC DAILY HEADACHE: ICD-10-CM

## 2021-04-01 DIAGNOSIS — D68.51 FACTOR 5 LEIDEN MUTATION, HETEROZYGOUS (H): ICD-10-CM

## 2021-04-01 DIAGNOSIS — H53.9 VISUAL DISTURBANCE: ICD-10-CM

## 2021-04-01 DIAGNOSIS — F41.8 SITUATIONAL ANXIETY: Primary | ICD-10-CM

## 2021-04-01 LAB — TSH SERPL DL<=0.005 MIU/L-ACNC: 1.59 MU/L (ref 0.4–4)

## 2021-04-01 PROCEDURE — 36415 COLL VENOUS BLD VENIPUNCTURE: CPT | Performed by: PHYSICIAN ASSISTANT

## 2021-04-01 PROCEDURE — 99214 OFFICE O/P EST MOD 30 MIN: CPT | Performed by: PHYSICIAN ASSISTANT

## 2021-04-01 PROCEDURE — 86038 ANTINUCLEAR ANTIBODIES: CPT | Performed by: PHYSICIAN ASSISTANT

## 2021-04-01 PROCEDURE — 84443 ASSAY THYROID STIM HORMONE: CPT | Performed by: PHYSICIAN ASSISTANT

## 2021-04-01 RX ORDER — PROPRANOLOL HYDROCHLORIDE 20 MG/1
20 TABLET ORAL DAILY PRN
Qty: 30 TABLET | Refills: 1 | Status: SHIPPED | OUTPATIENT
Start: 2021-04-01 | End: 2023-06-22

## 2021-04-01 RX ORDER — TOPIRAMATE 25 MG/1
25 TABLET, FILM COATED ORAL DAILY
Qty: 90 TABLET | Refills: 1 | Status: SHIPPED | OUTPATIENT
Start: 2021-04-01 | End: 2022-05-25

## 2021-04-01 RX ORDER — NARATRIPTAN 2.5 MG/1
2.5 TABLET ORAL
Qty: 18 TABLET | Refills: 1 | Status: SHIPPED | OUTPATIENT
Start: 2021-04-01 | End: 2021-11-24

## 2021-04-01 RX ORDER — NORTRIPTYLINE HCL 25 MG
25 CAPSULE ORAL AT BEDTIME
Qty: 90 CAPSULE | Refills: 1 | Status: SHIPPED | OUTPATIENT
Start: 2021-04-01 | End: 2022-05-25

## 2021-04-01 RX ORDER — PROPRANOLOL HCL 60 MG
120 CAPSULE, EXTENDED RELEASE 24HR ORAL DAILY
Qty: 180 CAPSULE | Refills: 1 | Status: SHIPPED | OUTPATIENT
Start: 2021-04-01 | End: 2022-05-25

## 2021-04-01 ASSESSMENT — ASTHMA QUESTIONNAIRES
QUESTION_3 LAST FOUR WEEKS HOW OFTEN DID YOUR ASTHMA SYMPTOMS (WHEEZING, COUGHING, SHORTNESS OF BREATH, CHEST TIGHTNESS OR PAIN) WAKE YOU UP AT NIGHT OR EARLIER THAN USUAL IN THE MORNING: NOT AT ALL
QUESTION_5 LAST FOUR WEEKS HOW WOULD YOU RATE YOUR ASTHMA CONTROL: COMPLETELY CONTROLLED
QUESTION_4 LAST FOUR WEEKS HOW OFTEN HAVE YOU USED YOUR RESCUE INHALER OR NEBULIZER MEDICATION (SUCH AS ALBUTEROL): NOT AT ALL
QUESTION_2 LAST FOUR WEEKS HOW OFTEN HAVE YOU HAD SHORTNESS OF BREATH: NOT AT ALL
ACT_TOTALSCORE: 25
QUESTION_1 LAST FOUR WEEKS HOW MUCH OF THE TIME DID YOUR ASTHMA KEEP YOU FROM GETTING AS MUCH DONE AT WORK, SCHOOL OR AT HOME: NONE OF THE TIME

## 2021-04-01 ASSESSMENT — MIFFLIN-ST. JEOR: SCORE: 1301.99

## 2021-04-01 ASSESSMENT — ANXIETY QUESTIONNAIRES
7. FEELING AFRAID AS IF SOMETHING AWFUL MIGHT HAPPEN: MORE THAN HALF THE DAYS
6. BECOMING EASILY ANNOYED OR IRRITABLE: SEVERAL DAYS
GAD7 TOTAL SCORE: 12
GAD7 TOTAL SCORE: 12
1. FEELING NERVOUS, ANXIOUS, OR ON EDGE: MORE THAN HALF THE DAYS
2. NOT BEING ABLE TO STOP OR CONTROL WORRYING: MORE THAN HALF THE DAYS
3. WORRYING TOO MUCH ABOUT DIFFERENT THINGS: MORE THAN HALF THE DAYS
7. FEELING AFRAID AS IF SOMETHING AWFUL MIGHT HAPPEN: MORE THAN HALF THE DAYS
5. BEING SO RESTLESS THAT IT IS HARD TO SIT STILL: SEVERAL DAYS
4. TROUBLE RELAXING: MORE THAN HALF THE DAYS

## 2021-04-02 LAB — ANA SER QL IF: NEGATIVE

## 2021-04-02 ASSESSMENT — ANXIETY QUESTIONNAIRES: GAD7 TOTAL SCORE: 12

## 2021-04-02 ASSESSMENT — ASTHMA QUESTIONNAIRES: ACT_TOTALSCORE: 25

## 2021-10-19 ENCOUNTER — OFFICE VISIT (OUTPATIENT)
Dept: PODIATRY | Facility: CLINIC | Age: 26
End: 2021-10-19
Payer: COMMERCIAL

## 2021-10-19 VITALS
BODY MASS INDEX: 20.09 KG/M2 | DIASTOLIC BLOOD PRESSURE: 70 MMHG | WEIGHT: 125 LBS | HEIGHT: 66 IN | SYSTOLIC BLOOD PRESSURE: 120 MMHG

## 2021-10-19 DIAGNOSIS — L60.0 ONYCHOCRYPTOSIS: Primary | ICD-10-CM

## 2021-10-19 PROCEDURE — 99213 OFFICE O/P EST LOW 20 MIN: CPT | Mod: 25 | Performed by: PODIATRIST

## 2021-10-19 PROCEDURE — 11750 EXCISION NAIL&NAIL MATRIX: CPT | Mod: T5 | Performed by: PODIATRIST

## 2021-10-19 RX ORDER — SILVER SULFADIAZINE 10 MG/G
CREAM TOPICAL
Qty: 50 G | Refills: 0 | Status: SHIPPED | OUTPATIENT
Start: 2021-10-19 | End: 2022-05-25

## 2021-10-19 ASSESSMENT — MIFFLIN-ST. JEOR: SCORE: 1328.75

## 2021-10-19 NOTE — PROGRESS NOTES
"Foot & Ankle Surgery   October 19, 2021    S:  Pt is seen today for evaluation of ingrown nails.  She had ingrown nails permanently removed from the lateral right and left hallux by Dr. Paez in August 2020.  She is currently having issues with the medial right hallux.  She occasionally has issues with lesser nail ingrown's but she is able to trim them out.  No acute issues and lesser nails noted today..    Vitals:    10/19/21 0837   BP: 120/70   Weight: 56.7 kg (125 lb)   Height: 1.676 m (5' 6\")   '      ROS - Pos for CC.  Patient denies current nausea, vomiting, chills, fevers, belly pain, calf pain, chest pain or SOB.  Complete remainder of ROS it otherwise neg.      PE:  Gen:   No apparent distress  Eye:    Visual scanning without deficit  Ear:    Response to auditory stimuli wnl  Lung:    Non-labored breathing on RA noted  Abd:    NTND per patient report  Lymph:    Neg for pitting/non-pitting edema BLE  Vasc:    Pulses palpable, CFT minimally delayed  Neuro:    Light touch sensation intact to all sensory nerve distributions without paresthesias  Derm:    Onychocryptosis with very mild inflammation at the medial right hallux nail border.  No cellulitis or drainage is noted.  Mild onychocryptosis at the medial border of the left third nail without paronychia or infection.  The corner has been debrided by the patient successfully  MSK:    ROM, strength wnl without limitation, no pain on palpation noted.  Calf:    Neg for redness, swelling or tenderness      Assessment:  26 year old female with pronounced painful onychocryptosis with very mild inflammation medial right hallux; intermittent onychocryptosis/paronychia lesser toes left foot      Plan:  Discussed etiologies, anatomy and options  1.  Pronounced painful onychocryptosis with very mild inflammation medial right hallux-Regarding the nail, treatment options were discussed.  They elected to proceed with a procedure, Partial permanent avulsion.  See " procedure note for details.  Risks that were discussed include but are not limited to infection, wound healing complications, nerve irritation, recurrence of the ingrown nail and the need for further procedures.  Antibiotic:  None needed   --Rx for Silvadene was sent to patient's pharmacy for twice-daily dressing changes  -patient was advised to follow up in 4 weeks for a 30 minute appointment if the wound has failed to progress/heal, and we'll proceed with an I&D; otherwise, follow up prn    After discussing the procedure, as well as risks, complications and post-procedure instructions, informed consent was obtained.    Anesthesia:  5 cc's of  1% lidocaine plain    Procedure:  After adequate prep, and with anesthesia achieved, a tourni-cot was applied to the involved toe(and removed after bandage application) and  attention was directed to the medial border of the R hallux where the nail plate was freed from surrounding soft tissue.  The offending border was  using an English Anvil and then removed in total.  The base of the wound was explored and showed no necrotic tissue, purulence or debris.  Phenol was then applied to the base of the wound for 30s x 3, and sufficient isopropyl alcohol was used to irrigate the wound.  The base of the wound/ nail matrix was curetted after each acid application.  A clean dressing was applied loosely to prevent vascular insult.  The patient tolerated the procedure well without complications.    Post-procedural instructions were dispensed and discussed with the patient.  All questions were answered.          Follow up:  prn or sooner with acute issues           Cristobal Galvez DPM [facility] FACFAOM  Podiatric Foot & Ankle Surgeon  [facility]  [phone]    Disclaimer: This note consists of symbols derived from keyboarding, dictation and/or voice recognition software. As a result, there may be errors in the script that have gone undetected. Please consider  this when interpreting information found in this chart.

## 2021-10-19 NOTE — PATIENT INSTRUCTIONS
Thank you for choosing [facility] / [facility]le [facility]!    DR GONZALES'S CLINIC LOCATIONS  [facility]   [address] [facility] [address]   [place] [address] [place], [place] [id number] [phone]       SET UP SURGERY: [phone]    APPOINTMENTS: [phone]    BILLING QUESTIONS: [phone]    FAX NUMBER: [phone]        INGROWN TOENAIL REMOVAL AFTERCARE  1. After the procedure, go home and elevate the foot/feet for the remainder of the day/evening as able. This is to minimize swelling, control pain, and limit post-procedural complications. The pre-procedural injection may cause your toe to be numb anywhere from 1-2 hours.    2. You can take Tylenol, Ibuprofen, Advil, etc as needed for pain if tolerated. Follow label instructions.     3. If you have been given a prescription for antibiotics, take them as instructed and complete the entire prescription.    4. Keep dressing intact until the following morning. Then remove the bandage (you may need to soak it in warm soapy water as the bandage will likely adhere to your skin).    5. Start soaking in warm soapy water for 5-10 minutes twice a day. Wash the toe thoroughly, dry the toe thoroughly. Apply antibiotic wound ointment to base of wound and cover with gauze and Coban dressing (not too tightly) until it stops draining. This may take a few days to weeks, but at that point, you may continue with antibiotic ointment and a band-aid, or you may stop applying a dressing all together. Dressing changes should be done twice daily if you had the permanent/chemical procedure done.    6. You may do activities as tolerated the following day. Find a shoe that is comfortable and minimizes the amount of rubbing on your toe, as this may increase pain, swelling, etc.    7. Monitor for signs of infection. With this procedure, it is common to have mild surrounding redness and drainage. If the redness involves the  entire toe or if you notice red streaks on top of your foot, or if you experience any nausea, vomiting, chills, fevers > 101 degrees, call clinic for a quick appointment.

## 2021-10-23 ENCOUNTER — HEALTH MAINTENANCE LETTER (OUTPATIENT)
Age: 26
End: 2021-10-23

## 2021-10-28 ENCOUNTER — OFFICE VISIT (OUTPATIENT)
Dept: FAMILY MEDICINE | Facility: CLINIC | Age: 26
End: 2021-10-28
Payer: COMMERCIAL

## 2021-10-28 ENCOUNTER — HOSPITAL ENCOUNTER (OUTPATIENT)
Dept: GENERAL RADIOLOGY | Facility: CLINIC | Age: 26
Discharge: HOME | End: 2021-10-28
Attending: PHYSICIAN ASSISTANT | Admitting: PHYSICIAN ASSISTANT
Payer: COMMERCIAL

## 2021-10-28 VITALS
BODY MASS INDEX: 19.66 KG/M2 | WEIGHT: 121.8 LBS | OXYGEN SATURATION: 100 % | DIASTOLIC BLOOD PRESSURE: 60 MMHG | TEMPERATURE: 97.7 F | SYSTOLIC BLOOD PRESSURE: 98 MMHG | RESPIRATION RATE: 14 BRPM | HEART RATE: 109 BPM

## 2021-10-28 DIAGNOSIS — M25.551 HIP PAIN, RIGHT: Primary | ICD-10-CM

## 2021-10-28 DIAGNOSIS — M25.551 HIP PAIN, RIGHT: ICD-10-CM

## 2021-10-28 PROCEDURE — 73502 X-RAY EXAM HIP UNI 2-3 VIEWS: CPT

## 2021-10-28 PROCEDURE — 99213 OFFICE O/P EST LOW 20 MIN: CPT | Performed by: PHYSICIAN ASSISTANT

## 2021-10-28 NOTE — PROGRESS NOTES
Luis Lake is a 26 year old who presents for the following health issues     HPI     Musculoskeletal problem/pain  Onset/Duration: 1 year  Description  Location: hip - left and right  Joint Swelling: no  Redness: no  Pain: YES- 7-8/10  Warmth: no  Intensity:  mild, moderate  Progression of Symptoms:  worsening  Accompanying signs and symptoms:   Fevers: no  Numbness/tingling/weakness: YES- sometimes in both thighs, mostly right hip  History  Trauma to the area: no  Recent illness:  no  Previous similar problem: no  Previous evaluation:  no  Precipitating or alleviating factors:  Aggravating factors include: standing, walking, climbing stairs, exercise and overuse  Therapies tried and outcome: Ibuprofen and biofreeze    Patient presents today for evaluation of right hip pain. She reports symptoms first started about 1 year but do seem to be more persistent. She reports it feels like grinding on the lateral aspect of her hip. This is not always bothersome with exercise. She reports at times she can go on a 10 mile hike without difficulty but then the pain can start a few days later. She denies any instability. No low back pain.  Mild right knee pain but attributes this more to compensation.     Review of Systems   Constitutional, HEENT, cardiovascular, pulmonary, gi and gu systems are negative, except as otherwise noted.      Objective    BP 98/60 (BP Location: Left arm)   Pulse 109   Temp 97.7  F (36.5  C) (Temporal)   Resp 14   Wt 55.2 kg (121 lb 12.8 oz)   SpO2 100%   BMI 19.66 kg/m    Body mass index is 19.66 kg/m .  Physical Exam   GENERAL: healthy, alert and no distress  MS: Right hip with no obvious deformity. No tenderness over the right SI. Mild tenderness over the lateral aspect of the right hip. Full ROM however pain with internal rotation and flexion. No calf tenderness.   SKIN: no suspicious lesions or rashes  NEURO: Normal strength and tone, mentation intact and speech normal  PSYCH:  mentation appears normal, affect normal/bright        Assessment & Plan     Hip pain, right  Xray obtained today. Suspect related to impingement syndrome. Exercises provided to patient today. May need to consider PT or orthopedic consult if becoming more bothersome.     The patient indicates understanding of these issues and agrees with the plan.    CRISTOPHER [facility]

## 2021-10-29 ASSESSMENT — ASTHMA QUESTIONNAIRES: ACT_TOTALSCORE: 23

## 2021-11-23 ENCOUNTER — OFFICE VISIT (OUTPATIENT)
Dept: PODIATRY | Facility: CLINIC | Age: 26
End: 2021-11-23
Payer: COMMERCIAL

## 2021-11-23 VITALS
SYSTOLIC BLOOD PRESSURE: 120 MMHG | DIASTOLIC BLOOD PRESSURE: 78 MMHG | WEIGHT: 120 LBS | HEART RATE: 90 BPM | BODY MASS INDEX: 19.37 KG/M2

## 2021-11-23 DIAGNOSIS — L60.0 INGROWING NAIL: Primary | ICD-10-CM

## 2021-11-23 PROCEDURE — 11750 EXCISION NAIL&NAIL MATRIX: CPT | Mod: TA | Performed by: PODIATRIST

## 2021-11-23 PROCEDURE — 99213 OFFICE O/P EST LOW 20 MIN: CPT | Mod: 25 | Performed by: PODIATRIST

## 2021-11-23 NOTE — LETTER
11/23/2021         RE: Aleksandra Lucas  [address]  [facility] [address]        Dear Colleague,    Thank you for referring your patient, Aleksandra Lucas, to the [facility]. Please see a copy of my visit note below.    Subjective:    Pt is seen today  w/ the c/c of a painful ingrown left great, second and third nail medial borders.  This has been problematic on hallux for years.  Just started on her second and third toenails this summer.  She has family history of ingrown nails.  Has had removal of lateral borders of both big toes by myself in the past and this worked well.  Recently had the medial border of the right hallux removed by another physician.  Denies purulence or odor    ROS: See above         Allergies   Allergen Reactions     No Known Allergies        Current Outpatient Medications   Medication Sig Dispense Refill     albuterol (PROAIR HFA/PROVENTIL HFA/VENTOLIN HFA) 108 (90 Base) MCG/ACT inhaler Inhale 2 puffs into the lungs every 4 hours as needed for shortness of breath / dyspnea, wheezing or other (cough) 18 g 1     naratriptan (AMERGE) 2.5 MG tablet Take 1 tablet (2.5 mg) by mouth at onset of headache for migraine 18 tablet 1     nortriptyline (PAMELOR) 25 MG capsule Take 1 capsule (25 mg) by mouth At Bedtime 90 capsule 1     propranolol (INDERAL) 20 MG tablet Take 1 tablet (20 mg) by mouth daily as needed (anxiety) 30 tablet 1     propranolol ER (INDERAL LA) 60 MG 24 hr capsule Take 2 capsules (120 mg) by mouth daily 180 capsule 1     silver sulfADIAZINE (SILVADENE) 1 % external cream Apply to procedure site right great toe twice daily with dressings until healed 50 g 0     topiramate (TOPAMAX) 25 MG tablet Take 1 tablet (25 mg) by mouth daily 90 tablet 1       Patient Active Problem List   Diagnosis     Acne     Factor 5 Leiden mutation, heterozygous (H)     Intractable migraine without aura and without status migrainosus     Encounter for IUD insertion        Past Medical History:   Diagnosis Date     ALLERGIC RHINITIS NOS 6/19/2007    Ragweed, animal dander, grasses, trees     Encounter for IUD insertion 4/10/2018    Lucy IUD - remove by 4/10/2021     Exercise induced bronchospasm 6/19/2007    See respiratory therapy note - has firm dx of asthma exacerbated with exercise.     Factor 5 Leiden mutation, heterozygous (H) 08/2012    POSITIVE heterozygous MUTATION     Headache(784.0)     diagnosed by neurologist     Pancreatitis 2/18/2010       Past Surgical History:   Procedure Laterality Date     OPEN REDUCTION INTERNAL FIXATION ELBOW  8/23/2011    Procedure:OPEN REDUCTION INTERNAL FIXATION ELBOW; open reduction internal fixation left elbow; Surgeon:TOOTIE DRAKE; Location:[facility] OR       Family History   Problem Relation Age of Onset     Blood Disease Mother         POSITIVE FACTOR 5     Breast Cancer Maternal Grandmother      Lung Cancer Maternal Grandmother      Diabetes No family hx of      Hypertension No family hx of      Colon Cancer No family hx of      Prostate Cancer No family hx of        Social History     Tobacco Use     Smoking status: Never Smoker     Smokeless tobacco: Never Used     Tobacco comment: no smoking in the home   Substance Use Topics     Alcohol use: No         Exam:    Vitals: /78   Pulse 90   Wt 54.4 kg (120 lb)   BMI 19.37 kg/m    BMI: Body mass index is 19.37 kg/m .  Height: Data Unavailable    Constitutional/ general:  Pt is in no apparent distress, appears well-nourished.  Cooperative with history and physical exam.     Psych:  The patient answered questions appropriately.  Normal affect.  Seems to have reasonable expectations, in terms of treatment.     Lungs:  Non labored breathing, non labored speech. No cough.  No audible wheezing. Even, quiet breathing.       Vascular:  positive pedal pulses bilaterally for both the DP and PT arteries.  CFT < 3 sec.  negative ankle edema.  positive pedal hair growth.    Neuro:  Alert  and oriented x 3. Coordinated gait.  Light touch sensation is intact to the L4, L5, S1 distributions. No obvious deficits.  No evidence of neurological-based weakness, spasticity, or contracture in the lower extremities.     Derm: Normal texture and turgor.  No erythema, ecchymosis, or cyanosis.      Musculoskeletal:    Lower extremity muscle strength is normal.  Patient is ambulatory without an assistive device or brace.  Normal arch with weightbearing.  No forefoot or rear foot deformities noted.   Normal ROM all fore foot and rearfoot joints.  No equinus.  left great, second and third toe nail medial borders shows soft tissue impingement with localized erythema.   negative active drainage/purulence at this time.  No sinus tracts.  No nailbed masses or exostosis.  No pain with range of motion of IPJ or MTPJ.  No ascending cellulitis.    ASSESSMENT:    Onychocryptosis with paronychia left great, second and third medial borders.    Discussed etiology and treatment options in detail w/ the pt.  The potential causes and nature of an ingrown toenail were discussed with the patient.  We reviewed the natural history/prognosis of the condition and potential risks if no treatment is provided.      Treatment options discussed included conservative management (oral antibiotics, soaking of foot, adequate width shoes)  as well as surgical management (partial or total nail removal).  The pros and cons of both forms of treatment were reviewed.  Handout given to patient.      After thorough discussion and answering all questions, the patient elected to try wider shoes for her left second and third toes and would like a permanent removal of the left hallux medial border.  Risk complications and efficacy discussed with patient.  Obtained consent, used 3cc of 1% lidocaine plain to block left great toe.  Sterile prep, then avulsed the affected border.  No evidence of deep abscess noted.  Phenol was applied times 3 at 3o second  intervals with curettage in between and then alcohol rinse.  Pt tolerated procedure well.  Sterile bandage placed, gave wound care instruction.  If second and third medial borders continue to bother her she will return for pin removal half hour appointment.  Return to clinic prn    Nik Paez DPM, FACFAS          Again, thank you for allowing me to participate in the care of your patient.        Sincerely,        Nik Paez DPM

## 2021-11-23 NOTE — PROGRESS NOTES
Subjective:    Pt is seen today  w/ the c/c of a painful ingrown left great, second and third nail medial borders.  This has been problematic on hallux for years.  Just started on her second and third toenails this summer.  She has family history of ingrown nails.  Has had removal of lateral borders of both big toes by myself in the past and this worked well.  Recently had the medial border of the right hallux removed by another physician.  Denies purulence or odor    ROS: See above         Allergies   Allergen Reactions     No Known Allergies        Current Outpatient Medications   Medication Sig Dispense Refill     albuterol (PROAIR HFA/PROVENTIL HFA/VENTOLIN HFA) 108 (90 Base) MCG/ACT inhaler Inhale 2 puffs into the lungs every 4 hours as needed for shortness of breath / dyspnea, wheezing or other (cough) 18 g 1     naratriptan (AMERGE) 2.5 MG tablet Take 1 tablet (2.5 mg) by mouth at onset of headache for migraine 18 tablet 1     nortriptyline (PAMELOR) 25 MG capsule Take 1 capsule (25 mg) by mouth At Bedtime 90 capsule 1     propranolol (INDERAL) 20 MG tablet Take 1 tablet (20 mg) by mouth daily as needed (anxiety) 30 tablet 1     propranolol ER (INDERAL LA) 60 MG 24 hr capsule Take 2 capsules (120 mg) by mouth daily 180 capsule 1     silver sulfADIAZINE (SILVADENE) 1 % external cream Apply to procedure site right great toe twice daily with dressings until healed 50 g 0     topiramate (TOPAMAX) 25 MG tablet Take 1 tablet (25 mg) by mouth daily 90 tablet 1       Patient Active Problem List   Diagnosis     Acne     Factor 5 Leiden mutation, heterozygous (H)     Intractable migraine without aura and without status migrainosus     Encounter for IUD insertion       Past Medical History:   Diagnosis Date     ALLERGIC RHINITIS NOS 6/19/2007    Ragweed, animal dander, grasses, trees     Encounter for IUD insertion 4/10/2018    Lucy IUD - remove by 4/10/2021     Exercise induced bronchospasm 6/19/2007    See  respiratory therapy note - has firm dx of asthma exacerbated with exercise.     Factor 5 Leiden mutation, heterozygous (H) 08/2012    POSITIVE heterozygous MUTATION     Headache(784.0)     diagnosed by neurologist     Pancreatitis 2/18/2010       Past Surgical History:   Procedure Laterality Date     OPEN REDUCTION INTERNAL FIXATION ELBOW  8/23/2011    Procedure:OPEN REDUCTION INTERNAL FIXATION ELBOW; open reduction internal fixation left elbow; Surgeon:TOOTIE DRAKE; Location:[facility] OR       Family History   Problem Relation Age of Onset     Blood Disease Mother         POSITIVE FACTOR 5     Breast Cancer Maternal Grandmother      Lung Cancer Maternal Grandmother      Diabetes No family hx of      Hypertension No family hx of      Colon Cancer No family hx of      Prostate Cancer No family hx of        Social History     Tobacco Use     Smoking status: Never Smoker     Smokeless tobacco: Never Used     Tobacco comment: no smoking in the home   Substance Use Topics     Alcohol use: No         Exam:    Vitals: /78   Pulse 90   Wt 54.4 kg (120 lb)   BMI 19.37 kg/m    BMI: Body mass index is 19.37 kg/m .  Height: Data Unavailable    Constitutional/ general:  Pt is in no apparent distress, appears well-nourished.  Cooperative with history and physical exam.     Psych:  The patient answered questions appropriately.  Normal affect.  Seems to have reasonable expectations, in terms of treatment.     Lungs:  Non labored breathing, non labored speech. No cough.  No audible wheezing. Even, quiet breathing.       Vascular:  positive pedal pulses bilaterally for both the DP and PT arteries.  CFT < 3 sec.  negative ankle edema.  positive pedal hair growth.    Neuro:  Alert and oriented x 3. Coordinated gait.  Light touch sensation is intact to the L4, L5, S1 distributions. No obvious deficits.  No evidence of neurological-based weakness, spasticity, or contracture in the lower extremities.     Derm: Normal texture and  turgor.  No erythema, ecchymosis, or cyanosis.      Musculoskeletal:    Lower extremity muscle strength is normal.  Patient is ambulatory without an assistive device or brace.  Normal arch with weightbearing.  No forefoot or rear foot deformities noted.   Normal ROM all fore foot and rearfoot joints.  No equinus.  left great, second and third toe nail medial borders shows soft tissue impingement with localized erythema.   negative active drainage/purulence at this time.  No sinus tracts.  No nailbed masses or exostosis.  No pain with range of motion of IPJ or MTPJ.  No ascending cellulitis.    ASSESSMENT:    Onychocryptosis with paronychia left great, second and third medial borders.    Discussed etiology and treatment options in detail w/ the pt.  The potential causes and nature of an ingrown toenail were discussed with the patient.  We reviewed the natural history/prognosis of the condition and potential risks if no treatment is provided.      Treatment options discussed included conservative management (oral antibiotics, soaking of foot, adequate width shoes)  as well as surgical management (partial or total nail removal).  The pros and cons of both forms of treatment were reviewed.  Handout given to patient.      After thorough discussion and answering all questions, the patient elected to try wider shoes for her left second and third toes and would like a permanent removal of the left hallux medial border.  Risk complications and efficacy discussed with patient.  Obtained consent, used 3cc of 1% lidocaine plain to block left great toe.  Sterile prep, then avulsed the affected border.  No evidence of deep abscess noted.  Phenol was applied times 3 at 3o second intervals with curettage in between and then alcohol rinse.  Pt tolerated procedure well.  Sterile bandage placed, gave wound care instruction.  If second and third medial borders continue to bother her she will return for pin removal half hour  appointment.  Return to clinic prn    Nik Paez, FELIX, FACFAS

## 2021-11-23 NOTE — PATIENT INSTRUCTIONS
We wish you continued good healing. If you have any questions or concerns, please do not hesitate to contact us at  [phone]    [facility] (secure e-mail communication and access to your chart) to send a message or to make an appointment.    Please remember to call and schedule a follow up appointment if one was recommended at your earliest convenience.     PODIATRY CLINIC HOURS  TELEPHONE NUMBER    [facility]        Clinics:  [place] Li CMA   Tuesday 1PM-6PM  [place]  Wednesday 745AM-330PM  [place]  Thursday/Friday 745AM-230PM  [place] APPOINTMENTS  [phone]    [facility]  [phone]          If you need a medication refill, please contact us you may need lab work and/or a follow up visit prior to your refill (i.e. Antifungal medications).    If MRI needed please call Imaging at [phone] or [phone]    HOW DO I GET MY KNEE SCOOTER? Knee scooters can be picked up at ANY Medical Supply stores with your knee scooter Prescription.  OR    Bring your signed prescription to an [facility] showroom.          INGROWN TOENAIL REMOVAL AFTERCARE ~PERMANENT NAIL REMOVAL      Go directly home and elevate the affected foot for the remainder of the day/evening if possible. Your toe may stay numb anywhere from 2-8 hours.     For pain take Tylenol, ibuprofen or another anti-inflammatory as needed for pain. You may apply ice on top of your foot behind he base of the toes, BUT, NOT ON IT.    That evening of the procedure, or morning after procedure.  you may remove the bandage. Began soaking foot three times a day (10-15 min each time) in lukewarm water with a pinch of salt. Epson or table salt     You may have  to do this for 6-8 weeks if Phenol was used during the procedure.    After soaks, pat the area dry. Apply antibiotic ointment to the area and cover with a  band-aid.    The following day. Find a shoe that is comfortable and minimizes the amount of rubbing on your toe, as this increases pain.    Dress with band-aids until no longer draining. Those that have had the phenol procedure, the toe will drain longer and will look like it is infected because it is a chemical burn.    Watch for any signs and symptoms of infection such as: redness, red streaks going up the foot/leg, swelling, pus or foul odor. Fevers > 101   Please call with questions.    Dr. Nik Paez D.P.M FAC FAS

## 2021-11-24 ENCOUNTER — OFFICE VISIT (OUTPATIENT)
Dept: FAMILY MEDICINE | Facility: CLINIC | Age: 26
End: 2021-11-24
Payer: COMMERCIAL

## 2021-11-24 VITALS
OXYGEN SATURATION: 97 % | HEIGHT: 67 IN | SYSTOLIC BLOOD PRESSURE: 118 MMHG | BODY MASS INDEX: 19.21 KG/M2 | HEART RATE: 116 BPM | RESPIRATION RATE: 18 BRPM | DIASTOLIC BLOOD PRESSURE: 70 MMHG | WEIGHT: 122.38 LBS | TEMPERATURE: 99 F

## 2021-11-24 DIAGNOSIS — Z11.3 SCREEN FOR STD (SEXUALLY TRANSMITTED DISEASE): ICD-10-CM

## 2021-11-24 DIAGNOSIS — Z30.430 ENCOUNTER FOR IUD INSERTION: ICD-10-CM

## 2021-11-24 DIAGNOSIS — Z12.4 SCREENING FOR CERVICAL CANCER: ICD-10-CM

## 2021-11-24 DIAGNOSIS — Z00.00 ROUTINE GENERAL MEDICAL EXAMINATION AT A HEALTH CARE FACILITY: Primary | ICD-10-CM

## 2021-11-24 DIAGNOSIS — Z30.433 ENCOUNTER FOR REMOVAL AND REINSERTION OF INTRAUTERINE CONTRACEPTIVE DEVICE: ICD-10-CM

## 2021-11-24 DIAGNOSIS — G43.019 INTRACTABLE MIGRAINE WITHOUT AURA AND WITHOUT STATUS MIGRAINOSUS: ICD-10-CM

## 2021-11-24 LAB — HCG UR QL: NEGATIVE

## 2021-11-24 PROCEDURE — 81025 URINE PREGNANCY TEST: CPT | Performed by: PHYSICIAN ASSISTANT

## 2021-11-24 PROCEDURE — 87591 N.GONORRHOEAE DNA AMP PROB: CPT | Performed by: PHYSICIAN ASSISTANT

## 2021-11-24 PROCEDURE — G0145 SCR C/V CYTO,THINLAYER,RESCR: HCPCS | Performed by: PHYSICIAN ASSISTANT

## 2021-11-24 PROCEDURE — 90471 IMMUNIZATION ADMIN: CPT | Performed by: PHYSICIAN ASSISTANT

## 2021-11-24 PROCEDURE — 58301 REMOVE INTRAUTERINE DEVICE: CPT | Performed by: PHYSICIAN ASSISTANT

## 2021-11-24 PROCEDURE — 87491 CHLMYD TRACH DNA AMP PROBE: CPT | Performed by: PHYSICIAN ASSISTANT

## 2021-11-24 PROCEDURE — 58300 INSERT INTRAUTERINE DEVICE: CPT | Mod: 51 | Performed by: PHYSICIAN ASSISTANT

## 2021-11-24 PROCEDURE — 99395 PREV VISIT EST AGE 18-39: CPT | Mod: 25 | Performed by: PHYSICIAN ASSISTANT

## 2021-11-24 PROCEDURE — 90682 RIV4 VACC RECOMBINANT DNA IM: CPT | Performed by: PHYSICIAN ASSISTANT

## 2021-11-24 RX ORDER — SUMATRIPTAN 50 MG/1
50 TABLET, FILM COATED ORAL
Qty: 30 TABLET | Refills: 3 | Status: SHIPPED | OUTPATIENT
Start: 2021-11-24 | End: 2022-11-17

## 2021-11-24 ASSESSMENT — ENCOUNTER SYMPTOMS
FEVER: 0
JOINT SWELLING: 0
BREAST MASS: 0
HEMATURIA: 0
CONSTIPATION: 0
WEAKNESS: 0
DIARRHEA: 0
PARESTHESIAS: 0
DIZZINESS: 0
NAUSEA: 0
COUGH: 0
SHORTNESS OF BREATH: 0
DYSURIA: 0
SORE THROAT: 0
HEMATOCHEZIA: 0
PALPITATIONS: 0
FREQUENCY: 0
EYE PAIN: 0
NERVOUS/ANXIOUS: 0
MYALGIAS: 0
ABDOMINAL PAIN: 0
HEADACHES: 0
CHILLS: 0
HEARTBURN: 0
ARTHRALGIAS: 0

## 2021-11-24 ASSESSMENT — PAIN SCALES - GENERAL: PAINLEVEL: NO PAIN (0)

## 2021-11-24 ASSESSMENT — MIFFLIN-ST. JEOR: SCORE: 1332.72

## 2021-11-24 NOTE — PROGRESS NOTES
SUBJECTIVE:   CC: Aleksandra Lucas is an 26 year old woman who presents for preventive health visit.     Patient has been advised of split billing requirements and indicates understanding: Yes  Healthy Habits:     Getting at least 3 servings of Calcium per day:  Yes    Bi-annual eye exam:  Yes    Dental care twice a year:  NO    Sleep apnea or symptoms of sleep apnea:  None    Diet:  Regular (no restrictions)    Frequency of exercise:  2-3 days/week    Duration of exercise:  15-30 minutes    Taking medications regularly:  Yes    Medication side effects:  Not applicable    PHQ-2 Total Score: 0    Additional concerns today:  No  IUD  Pertinent negatives include no abdominal pain, arthralgias, chest pain, chills, congestion, coughing, fever, headaches, joint swelling, myalgias, nausea, rash, sore throat or weakness.       Today's PHQ-2 Score:   PHQ-2 ( 1999 Pfizer) 11/24/2021   Q1: Little interest or pleasure in doing things 0   Q2: Feeling down, depressed or hopeless 0   PHQ-2 Score 0   PHQ-2 Total Score (12-17 Years)- Positive if 3 or more points; Administer PHQ-A if positive -   Q1: Little interest or pleasure in doing things Not at all   Q2: Feeling down, depressed or hopeless Not at all   PHQ-2 Score 0       Abuse: Current or Past (Physical, Sexual or Emotional) - No  Do you feel safe in your environment? Yes    Have you ever done Advance Care Planning? (For example, a Health Directive, POLST, or a discussion with a medical provider or your loved ones about your wishes): No, advance care planning information given to patient to review.  Patient declined advance care planning discussion at this time.    Social History     Tobacco Use     Smoking status: Never Smoker     Smokeless tobacco: Never Used     Tobacco comment: no smoking in the home   Substance Use Topics     Alcohol use: No     If you drink alcohol do you typically have >3 drinks per day or >7 drinks per week? No    Alcohol Use 11/24/2021   Prescreen:  >3 drinks/day or >7 drinks/week? No   Prescreen: >3 drinks/day or >7 drinks/week? -       Reviewed orders with patient.  Reviewed health maintenance and updated orders accordingly - Yes  BP Readings from Last 3 Encounters:   11/24/21 118/70   11/23/21 120/78   10/28/21 98/60    Wt Readings from Last 3 Encounters:   11/24/21 55.5 kg (122 lb 6 oz)   11/23/21 54.4 kg (120 lb)   10/28/21 55.2 kg (121 lb 12.8 oz)                  Patient Active Problem List   Diagnosis     Acne     Factor 5 Leiden mutation, heterozygous (H)     Intractable migraine without aura and without status migrainosus     Encounter for IUD insertion     Past Surgical History:   Procedure Laterality Date     OPEN REDUCTION INTERNAL FIXATION ELBOW  8/23/2011    Procedure:OPEN REDUCTION INTERNAL FIXATION ELBOW; open reduction internal fixation left elbow; Surgeon:TOOTIE DRAKE; Location: OR       Social History     Tobacco Use     Smoking status: Never Smoker     Smokeless tobacco: Never Used     Tobacco comment: no smoking in the home   Substance Use Topics     Alcohol use: No     Family History   Problem Relation Age of Onset     Blood Disease Mother         POSITIVE FACTOR 5     Breast Cancer Maternal Grandmother      Lung Cancer Maternal Grandmother      Diabetes No family hx of      Hypertension No family hx of      Colon Cancer No family hx of      Prostate Cancer No family hx of          Current Outpatient Medications   Medication Sig Dispense Refill     albuterol (PROAIR HFA/PROVENTIL HFA/VENTOLIN HFA) 108 (90 Base) MCG/ACT inhaler Inhale 2 puffs into the lungs every 4 hours as needed for shortness of breath / dyspnea, wheezing or other (cough) 18 g 1     levonorgestrel (TED) 13.5 MG IUD 1 each (13.5 mg) by Intrauterine route once       nortriptyline (PAMELOR) 25 MG capsule Take 1 capsule (25 mg) by mouth At Bedtime 90 capsule 1     propranolol (INDERAL) 20 MG tablet Take 1 tablet (20 mg) by mouth daily as needed (anxiety) 30 tablet 1      propranolol ER (INDERAL LA) 60 MG 24 hr capsule Take 2 capsules (120 mg) by mouth daily 180 capsule 1     silver sulfADIAZINE (SILVADENE) 1 % external cream Apply to procedure site right great toe twice daily with dressings until healed 50 g 0     SUMAtriptan (IMITREX) 50 MG tablet Take 1 tablet (50 mg) by mouth at onset of headache for migraine May repeat in 2 hours. Max 4 tablets/24 hours. 30 tablet 3     topiramate (TOPAMAX) 25 MG tablet Take 1 tablet (25 mg) by mouth daily 90 tablet 1     Allergies   Allergen Reactions     No Known Allergies        Breast Cancer Screening:    FHS-7:   Breast CA Risk Assessment (FHS-7) 11/24/2021   Did any of your first-degree relatives have breast or ovarian cancer? Yes   Did any of your relatives have bilateral breast cancer? Unknown   Did any man in your family have breast cancer? Yes   Did any woman in your family have breast and ovarian cancer? No   Did any woman in your family have breast cancer before age 50 y? No   Do you have 2 or more relatives with breast and/or ovarian cancer? No   Do you have 2 or more relatives with breast and/or bowel cancer? No     Patient under 40 years of age: Routine Mammogram Screening not recommended.   Pertinent mammograms are reviewed under the imaging tab.    History of abnormal Pap smear: NO - age 30- 65 PAP every 3 years recommended  PAP / HPV 4/10/2018   PAP (Historical) NIL     Reviewed and updated as needed this visit by clinical staff  Tobacco  Allergies  Meds             Reviewed and updated as needed this visit by Provider                   Review of Systems   Constitutional: Negative for chills and fever.   HENT: Negative for congestion, ear pain, hearing loss and sore throat.    Eyes: Negative for pain and visual disturbance.   Respiratory: Negative for cough and shortness of breath.    Cardiovascular: Negative for chest pain, palpitations and peripheral edema.   Gastrointestinal: Negative for abdominal pain, constipation,  "diarrhea, heartburn, hematochezia and nausea.   Breasts:  Negative for tenderness, breast mass and discharge.   Genitourinary: Negative for dysuria, frequency, genital sores, hematuria, pelvic pain, urgency, vaginal bleeding and vaginal discharge.   Musculoskeletal: Negative for arthralgias, joint swelling and myalgias.   Skin: Negative for rash.   Neurological: Negative for dizziness, weakness, headaches and paresthesias.   Psychiatric/Behavioral: Negative for mood changes. The patient is not nervous/anxious.         OBJECTIVE:   /70   Pulse 116   Temp 99  F (37.2  C) (Temporal)   Resp 18   Ht 1.702 m (5' 7\")   Wt 55.5 kg (122 lb 6 oz)   LMP 10/28/2021   SpO2 97%   Breastfeeding No   BMI 19.17 kg/m    Physical Exam  GENERAL: healthy, alert and no distress  EYES: Eyes grossly normal to inspection, PERRL and conjunctivae and sclerae normal  HENT: ear canals and TM's normal, nose and mouth without ulcers or lesions  NECK: no adenopathy, no asymmetry, masses, or scars and thyroid normal to palpation  RESP: lungs clear to auscultation - no rales, rhonchi or wheezes  CV: regular rate and rhythm, normal S1 S2, no S3 or S4, no murmur, click or rub, no peripheral edema and peripheral pulses strong  ABDOMEN: soft, nontender, no hepatosplenomegaly, no masses and bowel sounds normal   (female): normal female external genitalia, normal urethral meatus, vaginal mucosa pink, moist, well rugated, and normal cervix/adnexa/uterus without masses or discharge  MS: no gross musculoskeletal defects noted, no edema  SKIN: no suspicious lesions or rashes  NEURO: Normal strength and tone, mentation intact and speech normal  PSYCH: mentation appears normal, affect normal/bright    Diagnostic Test Results:  Labs reviewed in Epic    ASSESSMENT/PLAN:   (Z00.00) Routine general medical examination at a health care facility  (primary encounter diagnosis)    (Z30.430) Encounter for IUD insertion  Comment: See procedure note. " "  Plan: HCG Qual, Urine (EFK5064)    (G43.019) Intractable migraine without aura and without status migrainosus  Plan: SUMAtriptan (IMITREX) 50 MG tablet    (Z11.3) Screen for STD (sexually transmitted disease)  Plan: Pap screen reflex to HPV if ASCUS - recommend         age 25 - 29, NEISSERIA GONORRHOEA PCR,         CHLAMYDIA TRACHOMATIS PCR, HPV Hold (Lab Only)    (Z30.433) Encounter for removal and reinsertion of intrauterine contraceptive device  Plan: levonorgestrel (TED) 13.5 MG IUD 13.5 mg,         levonorgestrel (TED) 13.5 MG IUD, INSERTION         INTRAUTERINE DEVICE, REMOVE INTRAUTERINE DEVICE      Patient has been advised of split billing requirements and indicates understanding: Yes  COUNSELING:  Reviewed preventive health counseling, as reflected in patient instructions    Estimated body mass index is 19.17 kg/m  as calculated from the following:    Height as of this encounter: 1.702 m (5' 7\").    Weight as of this encounter: 55.5 kg (122 lb 6 oz).      She reports that she has never smoked. She has never used smokeless tobacco.      Counseling Resources:  ATP IV Guidelines  Pooled Cohorts Equation Calculator  Breast Cancer Risk Calculator  BRCA-Related Cancer Risk Assessment: FHS-7 Tool  FRAX Risk Assessment  ICSI Preventive Guidelines  Dietary Guidelines for Americans, 2010  USDA's MyPlate  ASA Prophylaxis  Lung CA Screening    [facility]  "

## 2021-11-25 LAB
C TRACH DNA SPEC QL NAA+PROBE: NEGATIVE
N GONORRHOEA DNA SPEC QL NAA+PROBE: NEGATIVE

## 2021-11-27 NOTE — PROGRESS NOTES
SUBJECTIVE:    Is a pregnancy test required: Yes.  Was it positive or negative?  Negative  Was a consent obtained?  Yes    Subjective: Aleksandra Lucas is a 26 year old No obstetric history on file. presents for IUD and desires Lucy type IUD.  She requests removal of the IUD because the IUD effectiveness has     Patient has been given the opportunity to ask questions about all forms of birth control, including all options appropriate for Aleksandra Lucas. Discussed that no method of birth control, except abstinence is 100% effective against pregnancy or sexually transmitted infection.     Aleksandra Lucas understands she may have the IUD removed at any time. IUD should be removed by a health care provider and the current IUD will be removed today.    The entire removal and insertion procedure was reviewed with the patient, including care after placement.    Today's PHQ-2 Score:   PHQ-2 (  Pfizer) 2021   Q1: Little interest or pleasure in doing things 0   Q2: Feeling down, depressed or hopeless 0   PHQ-2 Score 0   PHQ-2 Total Score (12-17 Years)- Positive if 3 or more points; Administer PHQ-A if positive -   Q1: Little interest or pleasure in doing things Not at all   Q2: Feeling down, depressed or hopeless Not at all   PHQ-2 Score 0       PROCEDURE:    Premedicated with ibuprofen.  A speculum exam was performed and the cervix was visualized. The IUD string was visualized. Using ring forceps, the string  was grasped and the IUD removed intact.    Under sterile technique, cervix was visualized with speculum and prepped with Betadine solution swab x 3. Tenaculum was placed for stability. The uterus was gently straightened and sounded to 7.5 cm. IUD prepared for placement, and IUD inserted according to 's instructions without difficulty or significant ressitance, and deployed at the fundus. The strings were visualized and trimmed to 3.0 cm from the external os. Tenaculum was removed and  hemostasis noted. Speculum removed.  Patient tolerated procedure well.    EBL: minimal    Complications: none      POST PROCEDURE:    Given 's handouts, including when to have IUD removed, list of danger s/sx, side effects and follow up recommended. Encouraged condom use for prevention of STD. Advised to call for any fever, for prolonged or severe pain or bleeding, abnormal vaginal dischage, or unable to palpate strings. She was advised to use pain medications (ibuprofen) as needed for mild to moderate pain. Advised to follow-up in clinic in 4-6 weeks for IUD string check if unable to find strings or as directed by provider.     Monica An PA-C

## 2021-11-29 LAB
BKR LAB AP GYN ADEQUACY: NORMAL
BKR LAB AP GYN INTERPRETATION: NORMAL
BKR LAB AP HPV REFLEX: NORMAL
BKR LAB AP LMP: NORMAL
BKR LAB AP PREVIOUS ABNORMAL: NORMAL
PATH REPORT.COMMENTS IMP SPEC: NORMAL
PATH REPORT.COMMENTS IMP SPEC: NORMAL
PATH REPORT.RELEVANT HX SPEC: NORMAL

## 2022-03-13 ENCOUNTER — E-VISIT (OUTPATIENT)
Dept: FAMILY MEDICINE | Facility: CLINIC | Age: 27
End: 2022-03-13
Payer: COMMERCIAL

## 2022-03-13 DIAGNOSIS — N39.0 ACUTE UTI (URINARY TRACT INFECTION): Primary | ICD-10-CM

## 2022-03-13 PROCEDURE — 99421 OL DIG E/M SVC 5-10 MIN: CPT | Performed by: PHYSICIAN ASSISTANT

## 2022-03-14 RX ORDER — NITROFURANTOIN 25; 75 MG/1; MG/1
100 CAPSULE ORAL 2 TIMES DAILY
Qty: 10 CAPSULE | Refills: 0 | Status: SHIPPED | OUTPATIENT
Start: 2022-03-14 | End: 2022-03-19

## 2022-03-14 NOTE — PATIENT INSTRUCTIONS
Dear Aleksandra Lucas    After reviewing your responses, I've been able to diagnose you with a urinary tract infection, which is a common infection of the bladder with bacteria.  This is not a sexually transmitted infection, though urinating immediately after intercourse can help prevent infections.  Drinking lots of fluids is also helpful to clear your current infection and prevent the next one.      I have sent a prescription for antibiotics to your pharmacy to treat this infection.    It is important that you take all of your prescribed medication even if your symptoms are improving after a few doses.  Taking all of your medicine helps prevent the symptoms from returning.     If your symptoms worsen, you develop pain in your back or stomach, develop fevers, or are not improving in 5 days, please contact your primary care provider for an appointment or visit any of our convenient Walk-in or Urgent Care Centers to be seen, which can be found on our website here.    Thanks again for choosing us as your health care partner,    Monica An PA-C

## 2022-04-28 ENCOUNTER — OFFICE VISIT (OUTPATIENT)
Dept: INTERNAL MEDICINE | Facility: CLINIC | Age: 27
End: 2022-04-28
Payer: COMMERCIAL

## 2022-04-28 ENCOUNTER — ANCILLARY PROCEDURE (OUTPATIENT)
Dept: GENERAL RADIOLOGY | Facility: CLINIC | Age: 27
End: 2022-04-28
Attending: NURSE PRACTITIONER
Payer: COMMERCIAL

## 2022-04-28 VITALS
SYSTOLIC BLOOD PRESSURE: 106 MMHG | BODY MASS INDEX: 19.15 KG/M2 | WEIGHT: 122 LBS | TEMPERATURE: 98.6 F | DIASTOLIC BLOOD PRESSURE: 66 MMHG | HEIGHT: 67 IN | OXYGEN SATURATION: 99 % | HEART RATE: 84 BPM

## 2022-04-28 DIAGNOSIS — D68.51 FACTOR 5 LEIDEN MUTATION, HETEROZYGOUS (H): ICD-10-CM

## 2022-04-28 DIAGNOSIS — I73.00 RAYNAUD'S DISEASE WITHOUT GANGRENE: ICD-10-CM

## 2022-04-28 DIAGNOSIS — M79.672 LEFT FOOT PAIN: ICD-10-CM

## 2022-04-28 DIAGNOSIS — M79.672 LEFT FOOT PAIN: Primary | ICD-10-CM

## 2022-04-28 PROCEDURE — 99214 OFFICE O/P EST MOD 30 MIN: CPT | Performed by: NURSE PRACTITIONER

## 2022-04-28 PROCEDURE — 73630 X-RAY EXAM OF FOOT: CPT | Mod: LT | Performed by: RADIOLOGY

## 2022-04-28 NOTE — PATIENT INSTRUCTIONS
-Your xray does not show a fracture- likely a sprain which can be just as painful.    -RICE- rest, ice, compress (ACE wrap if needed), elevate  -Ibuprofen for pain  -pain can persist for 4-6 weeks, possibly longer due to this being a foot fracture. If your pain worsens, or doesn't improve, follow up

## 2022-04-28 NOTE — PROGRESS NOTES
Assessment & Plan     Left foot pain  - Patient presents with left lateral foot pain after having a fall down a very steep hill while on vacation in [place] last week.  She does have mild ecchymosis (I don't think this is r/t her Raynauds - it appears like fresh bruising).  She is mildly tender to touch.  An x-ray was done in clinic that does not show any definite fracture.  I suspect she has a sprain.  We discussed the treatment for this.  If her symptoms do not improve or worsen she should return for a follow-up.  In the meantime we discussed RICE, NSAIDS/Tylenol for pain.  Return precautions reviewed.  - XR Foot Left G/E 3 Views    Factor 5 Leiden mutation, heterozygous (H)  - No hx of clots; was seen in Oncology in the past; not anticoagulated. Monitor    Raynaud's disease without gangrene  - Reports hx of- bilateral hands/feet.  Endorses that her hands turn white.  On exam, she has notably purplish red toes and her left fourth toe is quite dusky and cool.  She reassures me that this is not uncommon and this happens frequently and improves when she warms her feet.  She has excellent pulses in her feet.  - I recommend a Vascular Medicine consult to determine if treatment with CCB is appropriate.  Referral placed       See Patient Instructions    Return for If symptoms worsen/fail to improve.    GUNNAR Adler CNP  [facility] [place] is a 26 year old who presents for the following health issues     History of Present Illness       Reason for visit:  Foot injury - left foot, outer side of foot bruised and painful  Symptom onset:  3-7 days ago  Symptoms include:  Bruising and pain  Symptom intensity:  Moderate  Symptom progression:  Worsening  Had these symptoms before:  No  What makes it worse:  Walking    She eats 0-1 servings of fruits and vegetables daily.She consumes 1 sweetened beverage(s) daily.She exercises with enough effort to increase her  "heart rate 20 to 29 minutes per day.  She exercises with enough effort to increase her heart rate 3 or less days per week.   She is taking medications regularly.     Left foot pain:  Patient is seen in clinic today for concerns of left foot pain.  Patient reports that she was on vacation last week in [place] when she was walking down a steep hill and ended up falling, twisting her left ankle and falling onto her right knee.  Since that time she has had pain along her left lateral foot with increased bruising.  She has increased pain with walking and it improves when she elevates and rests her foot.  She has been icing and taking as needed ibuprofen with some relief.  Her main concern is that she has had increased bruising since this injury occurred.    Hx of Raynauds:  She has a history of Raynaud's and reports that her toes and forefoot are frequently purple in color and cool.  Sometimes she has numbness and tingling.  She has noted before that she has had a purple streak all the way up her left leg at one time.  She really does not have much pain.  She also has Raynaud's of her hands and notes that her fingers turn white when she has an episode.  She currently does not take anything to manage her Raynaud's and has never been seen by a vascular specialist.    Factor V Leiden:  History of, has been seen by oncology.  No history of clots.    Review of Systems   CONSTITUTIONAL:NEGATIVE for fever, chills, change in weight  INTEGUMENTARY/SKIN: Raynaud's with changes in color of skin to her toes and hands.  MUSCULOSKELETAL: POSITIVE  for left lateral foot pain with bruising      Objective    /66   Pulse 84   Temp 98.6  F (37  C) (Tympanic)   Ht 1.702 m (5' 7\")   Wt 55.3 kg (122 lb)   LMP  (LMP Unknown)   SpO2 99%   Breastfeeding No   BMI 19.11 kg/m    Body mass index is 19.11 kg/m .     Physical Exam   GENERAL APPEARANCE: healthy, alert and no distress  RESP: Respiratory rate is regular and easy  MS: " Bruising appreciated left lateral foot, no appreciable swelling.  Mild tenderness to palpation.  SKIN: Notably purplish - red toes, her left fourth toe is most notably purplish and cold.  She has excellent dorsalis pedis pulses, her feet are cool to touch  PSYCH: mentation appears normal and affect normal/bright

## 2022-04-29 ENCOUNTER — TELEPHONE (OUTPATIENT)
Dept: OTHER | Facility: CLINIC | Age: 27
End: 2022-04-29
Payer: COMMERCIAL

## 2022-04-29 NOTE — TELEPHONE ENCOUNTER
Pt referred to [facility] by Samantha Bustillo APRN CNP for Raynaud's.    Pt needs to be scheduled for consult with Vascular Medicine.  Will route to scheduling to coordinate an appointment at next available.    Appt note: Ref by GUNNAR Adler CNP for Raynaud's; notes in Epic.    SU GurrolaN, RN-[facility]

## 2022-05-25 ENCOUNTER — OFFICE VISIT (OUTPATIENT)
Dept: OTHER | Facility: CLINIC | Age: 27
End: 2022-05-25
Attending: NURSE PRACTITIONER
Payer: COMMERCIAL

## 2022-05-25 VITALS
WEIGHT: 120.4 LBS | HEIGHT: 66 IN | DIASTOLIC BLOOD PRESSURE: 77 MMHG | HEART RATE: 79 BPM | SYSTOLIC BLOOD PRESSURE: 111 MMHG | BODY MASS INDEX: 19.35 KG/M2 | OXYGEN SATURATION: 98 %

## 2022-05-25 DIAGNOSIS — D68.51 FACTOR 5 LEIDEN MUTATION, HETEROZYGOUS (H): ICD-10-CM

## 2022-05-25 DIAGNOSIS — G43.019 INTRACTABLE MIGRAINE WITHOUT AURA AND WITHOUT STATUS MIGRAINOSUS: ICD-10-CM

## 2022-05-25 DIAGNOSIS — I73.00 RAYNAUD'S DISEASE WITHOUT GANGRENE: Primary | ICD-10-CM

## 2022-05-25 PROCEDURE — 99205 OFFICE O/P NEW HI 60 MIN: CPT | Performed by: INTERNAL MEDICINE

## 2022-05-25 PROCEDURE — G0463 HOSPITAL OUTPT CLINIC VISIT: HCPCS

## 2022-05-25 RX ORDER — NIFEDIPINE 30 MG
30 TABLET, EXTENDED RELEASE ORAL DAILY
Qty: 30 TABLET | Refills: 5 | Status: SHIPPED | OUTPATIENT
Start: 2022-05-25 | End: 2022-11-17

## 2022-05-25 NOTE — PROGRESS NOTES
[facility] INITIAL VASCULAR MEDICINE CONSULT    (New patient visit)    PRIMARY HEALTH CARE PROVIDER:  Monica An PA-C      REFERRING HEALTH CARE PROVIDER;  GUNNAR Abdalla CNP      REASON FOR CONSULT: Evaluation and management of Raynauds       HPI: Aleksandra Lucas is a 26 year old year old very pleasant young female with past medical history of migraine headaches started since childhood, history of for TOS and no previous history of DVT or arm swelling, history of factor V Leyden mutation heterozygous type, exercise-induced bronchospasm taking as needed propranolol and also Imitrex developed discoloration of all the fingers and also all the toes and feet up to ankle area for the last 5 years.  Worse during the wintertime.  Feet more affected than hands.  Fingers turning into whitish whereas feeds turning into bluish, purplish, reddish.  She is non-smoker.  She is on birth control device IUD.  She never tried any calcium channel blockers.    She underwent extensive rheumatological tests in the past all of them are negative and also previous RUBIN, inflammatory markers were normal.    Her maternal grandmother with history of factor V Leyden mutation, migrainous headaches, rheumatological problems    She is new to this clinic reviewed available records in the epic and updated chart      PAST MEDICAL HISTORY  Past Medical History:   Diagnosis Date     ALLERGIC RHINITIS NOS 06/19/2007    Ragweed, animal dander, grasses, trees     Encounter for IUD insertion 04/10/2018    Lucy IUD - remove by 4/10/2021     Exercise induced bronchospasm 06/19/2007    See respiratory therapy note - has firm dx of asthma exacerbated with exercise.     Factor 5 Leiden mutation, heterozygous (H) 08/2012    POSITIVE heterozygous MUTATION     Headache(784.0)     diagnosed by neurologist     Pancreatitis 02/18/2010     Uncomplicated asthma 2012       CURRENT MEDICATIONS  albuterol (PROAIR HFA/PROVENTIL  HFA/VENTOLIN HFA) 108 (90 Base) MCG/ACT inhaler, Inhale 2 puffs into the lungs every 4 hours as needed for shortness of breath / dyspnea, wheezing or other (cough)  levonorgestrel (TED) 13.5 MG IUD, 1 each (13.5 mg) by Intrauterine route once  propranolol (INDERAL) 20 MG tablet, Take 1 tablet (20 mg) by mouth daily as needed (anxiety)  SUMAtriptan (IMITREX) 50 MG tablet, Take 1 tablet (50 mg) by mouth at onset of headache for migraine May repeat in 2 hours. Max 4 tablets/24 hours.    No current facility-administered medications on file prior to visit.      PAST SURGICAL HISTORY:  Past Surgical History:   Procedure Laterality Date     OPEN REDUCTION INTERNAL FIXATION ELBOW  8/23/2011    Procedure:OPEN REDUCTION INTERNAL FIXATION ELBOW; open reduction internal fixation left elbow; Surgeon:TOOTIE DRAEK; Location:[facility]       ALLERGIES     Allergies   Allergen Reactions     No Known Allergies        FAMILY HISTORY  Family History   Problem Relation Age of Onset     Blood Disease Mother         POSITIVE FACTOR 5     Breast Cancer Maternal Grandmother      Lung Cancer Maternal Grandmother      Diabetes Maternal Grandmother      Hypertension Maternal Grandmother      Colon Cancer No family hx of      Prostate Cancer No family hx of        VASCULAR FAMILY HISTORY  1st order relative with atherosclerotic PAD: No  1st order relative with AAA: No  Family history of Familial Hyperlipidemia No  Family History of Hypercoagulable state:No    VASCULAR RISK FACTORS  1. Diabetes:No   2. Smoking: has never smoked.  3. HTN: normotensive  4.Hyperlipidemia: No      SOCIAL HISTORY  Social History     Socioeconomic History     Marital status: Single     Spouse name: Not on file     Number of children: 0     Years of education: Not on file     Highest education level: Not on file   Occupational History     Occupation: student   Tobacco Use     Smoking status: Never Smoker     Smokeless tobacco: Never Used     Tobacco comment: no  smoking in the home   Vaping Use     Vaping Use: Never used   Substance and Sexual Activity     Alcohol use: Yes     Drug use: No     Sexual activity: Yes     Partners: Male     Birth control/protection: I.U.D.   Other Topics Concern     Parent/sibling w/ CABG, MI or angioplasty before 65F 55M? No   Social History Narrative     Not on file     Social Determinants of Health     Financial Resource Strain: Not on file   Food Insecurity: Not on file   Transportation Needs: Not on file   Physical Activity: Not on file   Stress: Not on file   Social Connections: Not on file   Intimate Partner Violence: Not on file   Housing Stability: Not on file       ROS:   General: No change in weight, sleep or appetite.  Normal energy.  No fever or chills  Eyes: Negative for vision changes or eye problems  ENT: No problems with ears, nose or throat.  No difficulty swallowing.  Resp: No coughing, wheezing or shortness of breath  CV: No chest pains or palpitations  GI: No nausea, vomiting,  heartburn, abdominal pain, diarrhea, constipation or change in bowel habits  : No urinary frequency or dysuria, bladder or kidney problems  Musculoskeletal: Last 5 years discoloration of all the fingers turning into reddish-whitish and discoloration of both feet and toes turning into bluish, purplish, reddish worse during the wintertime  Tip of the nose and earlobes are not affected  She was told she had a TOS but no intervention  Neurologic: No headaches, numbness, tingling, weakness, problems with balance or coordination  Psychiatric: No problems with anxiety, depression or mental health  Heme/immune/allergy: No history of bleeding or clotting problems or anemia.  No allergies or immune system problems  Endocrine: No history of thyroid disease, diabetes or other endocrine disorders  Vascular:  No claudication, lifestyle limiting or otherwise; no ischemic rest pain; no non-healing ulcers. No weakness, No loss of sensation  Both feet and toes are  "cold last 5 years worse during the winter and discoloration as described above      EXAM:  /77 (BP Location: Left arm, Patient Position: Chair, Cuff Size: Adult Large)   Pulse 79   Ht 5' 6\" (1.676 m)   Wt 120 lb 6.4 oz (54.6 kg)   LMP  (LMP Unknown)   SpO2 98%   BMI 19.43 kg/m    In general, the patient is a pleasant female in no apparent distress.    HEENT: NC/AT.  PERRLA.  EOMI.  Sclerae white, not injected.  Nares clear.  Pharynx without erythema or exudate.  Dentition intact.    Neck: No adenopathy.  No thyromegaly. Carotids +2/2 bilaterally without bruits.  No jugular venous distension.   Heart: RRR. Normal S1, S2 splits physiologically. No murmur, rub, click, or gallop. The PMI is in the 5th ICS in the midclavicular line. There is no heave.    Lungs: CTA.  No ronchi, wheezes, rales.  No dullness to percussion.   Abdomen: Soft, nontender, nondistended. No organomegaly. No AAA.  No bruits.   Extremities/vascular:  Both feet and toes are colder than fingers, abnormal capillary response 5 to 6 seconds of both feet  Palpable symmetrical peripheral pulses  No varicose veins  No foot ulcers or leg ulcers  Bedside TOS maneuvers loss of radial pulse at 90 degrees, 180 degrees bilaterally      Labs:  LIPID RESULTS:  Lab Results   Component Value Date    CHOL 140 02/18/2010    HDL 53 02/18/2010    LDL 76 02/18/2010    TRIG 52 02/18/2010    CHOLHDLRATIO 3.0 02/18/2010       LIVER ENZYME RESULTS:  Lab Results   Component Value Date    AST 20 07/11/2020    ALT 25 07/11/2020       CBC RESULTS:  Lab Results   Component Value Date    WBC 7.1 07/11/2020    RBC 4.12 07/11/2020    HGB 13.1 07/11/2020    HCT 37.4 07/11/2020    MCV 91 07/11/2020    MCH 31.8 07/11/2020    MCHC 35.0 07/11/2020    RDW 11.9 07/11/2020     07/11/2020       BMP RESULTS:  Lab Results   Component Value Date     07/11/2020    POTASSIUM 3.5 07/11/2020    CHLORIDE 110 (H) 07/11/2020    CO2 23 07/11/2020    ANIONGAP 6 07/11/2020    GLC " 80 2020    BUN 16 2020    CR 0.76 2020    GFRESTIMATED >90 2020    GFRESTBLACK >90 2020    MAO 8.5 2020          THYROID RESULTS:  Lab Results   Component Value Date    TSH 1.59 2021       Procedures:   Exam: Bilateral lower extremity ankle-brachial indices (ABIs) with  exercise dated 2020 9:13 AM     Comparison: None     Clinical History: Bilateral lower extremity color changes when  standing and exercising, feeling of pins-and-needles as well.     Ordering clinician: DEVIKA GUILLAUME     Technique: Baseline ankle brachial index obtained at rest, followed by  exercise ankle brachial index using Michaels-Hawk Exercise Protocol  at treadmill speed 2 mph, beginning at 0 percent grade increased to 14  percent for a total of 16 minutes.     Findings:     Resting TOÑA:     Right:  Arm: 94 mmHg  PT at ankle: 119 mmHg   DP at foot: 105 mmHg    1st toe pressure: 84     TOÑA: 1.27  TBI:     Right pulse volume recordings or VPR:       High thigh: Normal   Lower thigh: Normal   Proximal calf: Normal   Ankle: Normal     1st digit PPG: Normal     Left:     Arm: 92 mmHg   PT at ankle: 121 mmHg   DP at foot: 119 mmHg   1st toe pressure: 72     TOÑA: 72  TBI:      Left pulse volume recordings or VPR:       High thigh: Normal   Lower thigh: Normal   Proximal calf: Normal   Ankle: Normal     1st digit PPG: Normal     Exercise study:     Baseline severity of pain in legs prior to exercise on a scale of  1-10: 0     Severity of pain during exercise:        Right leg: No pain developed during exercise.       Left leg: No pain developed during exercise.     Post exercise TOÑA:       Right le.32.    Left le.29.                                                                      Impression:      Right le. Resting TOÑA is 1.27. Normal.  2. Exercise study: Negative     Left le. Resting TOÑA is 1.29. Normal.  2. Exercise study: Negative     TOÑA Diagnostic  Criteria (Based on criteria published in Circulation  2011; 124: 1523-8774):    > 1.4: Non compressible    1.00 - 1.40: Normal    0.91 - 0.99: Borderline    At or below 0.90: Abnormal     TOÑA Diagnostic Criteria (Based on ACC/AHA guideline 2008):    >/=1.3 - non compressible vessels    1.00  -1.29 - Normal    0.91 - 0.99 - Borderline    0.41 - 0.90 - Mild to moderate PAD    0.00 - 0.40 - Severe PAD     Guidelines (Circulation 2012; 126: 2890)  1. Decrease in ankle pressure of 30 mm or a decrease of 20 percent  greater from baseline TOÑA is diagnostic of PAD (Class IIa; level of  evidence A)  2. Decrease in TOÑA of 0.2 or greater, failure to return ankle pressure  to baseline in 3 minutes.     MIGUEL ANGEL MELENDEZ             Exam: US VENOUS COMPENTENCY BILATERAL 5/22/2020 9:12 AM     Comparison study: TOÑA performed the same day     Clinical History: Lower extremity skin discoloration when standing  pins-and-needles.     Technique: B-mode (grayscale) and Duplex Doppler ultrasound of the  lower extremity veins (superficial and deep), including incompetency  reflux time and compression for thrombus. Additional Duplex doppler  assessment of the PTA and DICK at the ankles. Superficial incompetency  exam performed upright, non-weight bearing with distal compression  using rapid inflation/deflation cuffs.     Scan position for evaluation: Standing     Ordering provider: DEVIKA GUILLAUME      Venous Competency Diagnostic Criteria Adopted 11/29/11.     Venous competency criteria defining abnormal reflux duration:  Femoral - popliteal reflux > 1.0 sec.  Deep femoral, deep calf veins, and superficial vein reflux > 0.5 sec.   vein reflux > 0.35 sec.     Supporting document: J Vasc Surg 2003; 38:793-8. Definition of reflux  in lower extremity veins.     Findings:      Right ankle:   Posterior Tibial artery waveform: tri/biphasic  Dorsalis Pedis artery waveform: tri/biphasic     Left ankle:  Posterior Tibial artery  waveform: tri/biphasic  Dorsalis Pedis artery waveform: tri/biphasic     Right Lower Extremity:      Duplex Evaluation for Deep Vein Thrombus (which includes CFV, FV,  popliteal vein, and posterior tibial veins): Negative.     Common femoral vein: Competent     Deep femoral vein: Competent     Femoral vein:      proximal thigh: Competent      mid thigh: Competent      distal thigh: Competent     Popliteal vein: Competent     Posterior tibial veins at the ankle: Competent     Left lower extremity:     Duplex Evaluation for Deep Vein Thrombus (which includes CFV, FV,  popliteal vein, and posterior tibial veins): Negative.      Common femoral vein: Competent     Deep femoral vein: Competent     Femoral vein:      proximal thigh: Competent      mid thigh: Competent      distal thigh: Competent     Popliteal vein: Competent     Posterior tibial veins at the ankle: Competent     Superficial Venous Incompetency Study:        Right Lower Extremity:      Duplex Evaluation for Superficial Vein Thrombus (which includes GSV,  SSV, AASV, and PASV): Negative.      Anterior accessory saphenous (AASV): Competent     Posterior accessory saphenous (PASV): Competent     Great saphenous vein (GSV)      sapheno-femoral junction: Competent      prox thigh: Competent      mid thigh: Competent      dist thigh: Competent       knee: Incompetent      prox calf: Competent      mid calf: Competent      ankle: Competent     Vein of Giacomini (V of G): Nonvisualized     Small saphenous vein:      sapheno-popliteal junction: Competent      prox calf: Competent      mid calf: Competent     Diameters of superficial veins:     AASV: Diameter 3.3 mm     SFJ: Diameter* 6.1 mm  GSV prox thigh*: Diameter 3.1 mm      GSV knee*: Diameter 3.1 mm     SSV SPJ*: Diameter 4.9 mm     No incompetent varicosities or  veins identified.     Left lower extremity:     Duplex Evaluation for Superficial Vein Thrombus (which includes GSV,  SSV, AASV, and  "PASV): Negative.     Anterior accessory saphenous (AASV): Competent     Posterior accessory saphenous (PASV): Competent     Great saphenous vein (GSV)      sapheno-femoral junction: Competent      prox thigh: Incompetent      mid thigh: Competent      dist thigh: Competent       knee: Competent      prox calf: Competent      mid calf: Incompetent      ankle: Competent     Vein of Giacomini (V of G): Nonvisualized     Small saphenous vein:      sapheno-popliteal junction: Competent      prox calf: Competent      mid calf: Competent     Diameters of superficial veins:     AASV: Diameter 3.3 mm     SFJ: Diameter* 7.0 mm  GSV prox thigh*: Diameter 3.2 mm      GSV knee*: Diameter 4.4 mm     GSV Mid Calf: Diameter 3.5 mm     SSV SPJ*: Diameter 3.7 mm     No incompetent varicosities or  veins identified.                                                                      Impression:     1. Right lea. No deep or superficial venous thrombosis.  1b. No deep venous incompetency.  1c. Only a segment of superficial venous incompetency involving the  great saphenous vein at the knee. The remainder of the superficial  veins about the right leg are competent.  1d. No incompetent varicosities and  veins.     2. Left lea. No deep or superficial venous thrombosis.  2b. No deep venous incompetency.  2c. Segmental superficial venous incompetency involving the great  saphenous vein in the proximal thigh and mid calf. The remainder of  the great saphenous and other superficial veins are competent.  2d. No incompetent varicosities and  veins.     Reference: \"Duplex Ultrasound in the Diagnosis of Lower-Extremity Deep  Venous Thrombosis\"- Wendy Ramirez MD, S; Gavin San MD  (Circulation. 2014;129:917-921. http://circ.ahajournals.org )     MIGUEL ANGEL MELENDEZ  Assessment and Plan:     1. Raynaud's disease without gangrene    She has a classical features of Raynaud's and possible " acrocyanosis  She never tried calcium channel blockers  I reviewed her previous laboratory tests she underwent multiple occasions rheumatological studies and also inflammatory markers all of them were negative.  Symptoms gets worse during winter.   She was a  in the college, probably has a some sort of for frostbite as well.  She has no cutaneous lesions or skin ulcers.  No joint pains  Her symptoms probably exacerbated by nonspecific beta-blocker propranolol use for headaches and also Imitrex.  I will repeat RUBNI, rheumatoid factor, CRP and ESR, notify results through [facility]  Initiate low-dose nifedipine XL 30 mg daily, discussed side effects approximately 10% of the patients may develop some ankle swelling or leg swelling.  Thermal protection suggested  AVS with education sheet given    Will reassess in 2 to 3 months virtual visit     - Vascular Medicine Referral  - NIFEdipine ER (ADALAT CC) 30 MG 24 hr tablet; Take 1 tablet (30 mg) by mouth daily  Dispense: 30 tablet; Refill: 5  - CRP inflammation; Future  - Erythrocyte sedimentation rate auto; Future  - Anti Nuclear Geri IgG by IFA with Reflex; Future  - Rheumatoid factor; Future    2. Factor 5 Leiden mutation, heterozygous (H)  No previous history of DVT  Discussed higher risk of blood clots specifically with combination of birth control      3. Intractable migraine without aura and without status migrainosus  She has been dealing with headaches since young age 7, currently taking propranolol and also Imitrex which are probably exacerbating her Raynauds.  She has not seen neurologist  She will benefit seeing headache clinic referral placed    - Adult Neurology  Referral; Future    4. TOS:  She is currently stable, right-handed.  Loss of radial pulse with abduction and 90 degrees,  position and also 180 degrees  Watch for symptoms if worsens consider physical therapy and appropriate imaging studies in the future    Thank you for the  consultation !  This note was dictated by utilizing Dragon software    60  minutes spent on the date of the encounter doing chart review, history and exam, documentation, and further activities as noted above.  She is new to this clinic reviewed available records in the epic and updated chart    Virtual visit in 2 to 3 months    Copy of this note to primary care provider and referring provider    Janell Carter MD, FAHA, FSVM, FNLA  Vascular medicine  Clinical lipidologist  Clinical hypertension specialist

## 2022-05-25 NOTE — PROGRESS NOTES
"Patient is here to discuss Ref by GUNNAR Adler CNP for Raynaud's.    /77 (BP Location: Left arm, Patient Position: Chair, Cuff Size: Adult Large)   Pulse 79   Ht 5' 6\" (1.676 m)   Wt 120 lb 6.4 oz (54.6 kg)   LMP  (LMP Unknown)   SpO2 98%   BMI 19.43 kg/m      Questions patient would like addressed today are: N/A.    Refills are needed: N/A    Has homecare services and agency name:  Lissy Betancourt  "

## 2022-05-25 NOTE — PATIENT INSTRUCTIONS
Raynaud Phenomenon     What is Raynaud phenomenon?   Raynaud phenomenon (RP) is a name given to episodes of color changes (usually pale or blue) in the hands and feet in response to the cold or emotional stress. The episodes usually come on suddenly and last minutes to hours. At first it might affect only one finger or toe, but may, over time, spread to other fingers and toes. There is sometimes a clear line at which the color changes from normal to discolored. It may affect both hands equally and may cause numbness, tingling, or an aching pain.  Sometimes the arms or legs may get mottled, and it can also affect other areas of the body, such as the ears, nose, and face. Upon warming, the hands and feet usually become red or flushed and they may feel swollen or itchy.     Who gets it?   RP is fairly common, especially in regions with colder climates. It affects girls somewhat more often than boys.  It often runs in families.      Most people with RP do not have an underlying rheumatic disease and will not go on to develop one. RP can be associated with rheumatic diseases including lupus, systemic sclerosis, and mixed connective tissue disease (MCTD), however these patients will have additional signs and symptoms of those diseases.     What causes it?   Normally, blood vessels constrict in response to cold temperatures in order to keep the body warm. It is thought that in RP, there is over-activation of blood vessel constriction in response to cold (and stress and exercise). This constriction leads to limited blood supply to the skin resulting in paleness. The blue color results because the tissue in the hands and feet are extracting so much oxygen from the slow-flowing blood. The fingers and toes appear red when re-warmed because of exaggerated blood flow through the blood vessels. These episodes of decreased and increased blood flow are mainly a nuisance, and do not (with rare exceptions) damage the fingers or  toes.    How is it diagnosed?   RP is diagnosed based upon the story and exam by your doctor. There are no simple tests to help diagnose RP. Your doctor would ask questions to be sure there is no associated rheumatic disease, do a general physical examination, and carefully look at your nailfold capillaries with a magnifier in the office.  Abnormalities in the nailfold capillaries may indicate an underlying rheumatic disease. Lab testing, such as an RUBIN, is not routinely necessary unless there are other reasons to suspect a rheumatic disease.     How is it treated?  Mild RP can be treated with simple measures, such as dressing warmly (for example wearing mittens, long underwear, and feet warmers), avoiding sudden cold exposures, minimizing emotional stress, and avoiding other aggravating factors (such as cigarette smoke, and stimulants such as decongestants and diet pills).     Children can learn through biofeedback training how to increase the blood flow to their fingers and toes and increase their skin temperatures. There are several medications that can be helpful, and the most commonly used medication is a calcium-channel blocker called nifedipine.

## 2022-05-31 ENCOUNTER — TELEPHONE (OUTPATIENT)
Dept: OTHER | Facility: CLINIC | Age: 27
End: 2022-05-31
Payer: COMMERCIAL

## 2022-06-06 ENCOUNTER — LAB (OUTPATIENT)
Dept: LAB | Facility: CLINIC | Age: 27
End: 2022-06-06
Payer: COMMERCIAL

## 2022-06-06 DIAGNOSIS — I73.00 RAYNAUD'S DISEASE WITHOUT GANGRENE: ICD-10-CM

## 2022-06-06 LAB — ERYTHROCYTE [SEDIMENTATION RATE] IN BLOOD BY WESTERGREN METHOD: 7 MM/HR (ref 0–20)

## 2022-06-06 PROCEDURE — 86140 C-REACTIVE PROTEIN: CPT

## 2022-06-06 PROCEDURE — 86038 ANTINUCLEAR ANTIBODIES: CPT

## 2022-06-06 PROCEDURE — 85652 RBC SED RATE AUTOMATED: CPT

## 2022-06-06 PROCEDURE — 36415 COLL VENOUS BLD VENIPUNCTURE: CPT

## 2022-06-06 PROCEDURE — 86431 RHEUMATOID FACTOR QUANT: CPT

## 2022-06-07 LAB
ANA SER QL IF: NEGATIVE
CRP SERPL-MCNC: <2.9 MG/L (ref 0–8)
RHEUMATOID FACT SER NEPH-ACNC: <6 IU/ML

## 2022-06-07 NOTE — RESULT ENCOUNTER NOTE
All of your recent lab tests are normal ( good!)   Continue same medications and plan discussed in the clinic

## 2022-07-05 ENCOUNTER — OFFICE VISIT (OUTPATIENT)
Dept: MIDWIFE SERVICES | Facility: CLINIC | Age: 27
End: 2022-07-05
Payer: COMMERCIAL

## 2022-07-05 VITALS
HEIGHT: 66 IN | BODY MASS INDEX: 19.13 KG/M2 | DIASTOLIC BLOOD PRESSURE: 64 MMHG | WEIGHT: 119 LBS | SYSTOLIC BLOOD PRESSURE: 102 MMHG

## 2022-07-05 DIAGNOSIS — Z30.012 EMERGENCY CONTRACEPTION: ICD-10-CM

## 2022-07-05 DIAGNOSIS — Z30.432 ENCOUNTER FOR REMOVAL OF INTRAUTERINE CONTRACEPTIVE DEVICE: Primary | ICD-10-CM

## 2022-07-05 PROBLEM — Z30.430 ENCOUNTER FOR IUD INSERTION: Status: RESOLVED | Noted: 2018-04-10 | Resolved: 2022-07-05

## 2022-07-05 PROCEDURE — 58301 REMOVE INTRAUTERINE DEVICE: CPT | Performed by: ADVANCED PRACTICE MIDWIFE

## 2022-07-05 RX ORDER — MULTIPLE VITAMINS W/ MINERALS TAB 9MG-400MCG
1 TAB ORAL DAILY
COMMUNITY

## 2022-07-05 RX ORDER — LEVONORGESTREL 1.5 MG/1
1.5 TABLET ORAL ONCE
Qty: 1 TABLET | Refills: 0 | Status: SHIPPED | OUTPATIENT
Start: 2022-07-05 | End: 2022-11-17

## 2022-07-05 RX ORDER — MULTIVITAMIN WITH IRON
1 TABLET ORAL DAILY
COMMUNITY

## 2022-07-05 NOTE — PROGRESS NOTES
SUBJECTIVE:   Aleksandra Lucas is a 26 year old who presents to the clinic for discussion of birth control methods.   She has used the following methods in the past: POP and Lucy IUD  Today she is interested in discussing non-hormonal/natural methods. She is unable to take estrogen products due to a positive heterozygous Factor 5 Leiden mutation. Her mother and grandmother also both have this trait and have had blood clots. She was tested at age 14 before a surgery. She has never had a blood clot.     Histories reviewed and updated  Past Medical History:   Diagnosis Date     ALLERGIC RHINITIS NOS 06/19/2007    Ragweed, animal dander, grasses, trees     Encounter for IUD insertion 04/10/2018    Lucy IUD - remove by 4/10/2021     Exercise induced bronchospasm 06/19/2007    See respiratory therapy note - has firm dx of asthma exacerbated with exercise.     Factor 5 Leiden mutation, heterozygous (H) 08/2012    POSITIVE heterozygous MUTATION     Headache(784.0)     diagnosed by neurologist     Pancreatitis 02/18/2010     Uncomplicated asthma 2012     Past Surgical History:   Procedure Laterality Date     OPEN REDUCTION INTERNAL FIXATION ELBOW  8/23/2011    Procedure:OPEN REDUCTION INTERNAL FIXATION ELBOW; open reduction internal fixation left elbow; Surgeon:TOOTIE DRAKE; Location: OR     Social History     Socioeconomic History     Marital status: Single     Spouse name: Not on file     Number of children: 0     Years of education: Not on file     Highest education level: Not on file   Occupational History     Occupation: student   Tobacco Use     Smoking status: Never Smoker     Smokeless tobacco: Never Used     Tobacco comment: no smoking in the home   Vaping Use     Vaping Use: Never used   Substance and Sexual Activity     Alcohol use: Yes     Drug use: No     Sexual activity: Yes     Partners: Male     Birth control/protection: I.U.D.   Other Topics Concern     Parent/sibling w/ CABG, MI or angioplasty  "before 65F 55M? No   Social History Narrative     Not on file     Social Determinants of Health     Financial Resource Strain: Not on file   Food Insecurity: Not on file   Transportation Needs: Not on file   Physical Activity: Not on file   Stress: Not on file   Social Connections: Not on file   Intimate Partner Violence: Not on file   Housing Stability: Not on file     Family History   Problem Relation Age of Onset     Blood Disease Mother         POSITIVE FACTOR 5     Breast Cancer Maternal Grandmother      Lung Cancer Maternal Grandmother      Diabetes Maternal Grandmother      Hypertension Maternal Grandmother      Colon Cancer No family hx of      Prostate Cancer No family hx of          Health maintenance updated:  yes    ROS:   12 point review of systems negative other than symptoms noted below or in the HPI.    EXAM:  /64   Ht 1.676 m (5' 6\")   Wt 54 kg (119 lb)   BMI 19.21 kg/m    Body mass index is 19.21 kg/m .    ASSESSMENT/PLAN:    ICD-10-CM    1. Encounter for removal of intrauterine contraceptive device  Z30.432 REMOVE INTRAUTERINE DEVICE   2. Emergency contraception  Z78.9 levonorgestrel (PLAN B) 1.5 MG tablet     There are no contraindications to the use of Natural family planning.     COUNSELING:  Discussed using an ileana for ovulation and fertility tracking. Discussed options of web sites to learn about mucous changes, etc.     Discussed Plan B in case of unprotected intercourse in fertility timeframe. An order was placed.       Donal Womack, SARA, APRN, CNM      IUD Removal:  SUBJECTIVE:    Is a pregnancy test required: No.  Was a consent obtained?  Yes    Aleksandra Lucas is a 26 year old female,No obstetric history on file., No LMP recorded. (Menstrual status: IUD). who presents today for IUD removal. Her current IUD was placed 11/2021. She has not had problems with the IUD. She requests removal of the IUD because she wants to change her method of contraception    Today's PHQ-2 " Score:   PHQ-2 ( 1999 Pfizer) 4/28/2022   Q1: Little interest or pleasure in doing things 0   Q2: Feeling down, depressed or hopeless 0   PHQ-2 Score 0   PHQ-2 Total Score (12-17 Years)- Positive if 3 or more points; Administer PHQ-A if positive -   Q1: Little interest or pleasure in doing things Not at all   Q2: Feeling down, depressed or hopeless Not at all   PHQ-2 Score 0       PROCEDURE:    A speculum exam was performed and the cervix was visualized. The IUD string was visualized. Using ring forceps, the string  was grasped and the IUD removed intact.    POST PROCEDURE:    The patient tolerated the procedure well. Patient was discharged in stable condition.    Call if bleeding, pain or fever occur. and Use of condoms/natural family planning discussed.      30 minutes total spent on the date of the encounter doing chart review, history and exam, documentation and further activities per the note. 7 minutes of that time was used for the IUD removal portion of the visit.     GUNNAR [place] CNM

## 2022-08-04 ENCOUNTER — TELEPHONE (OUTPATIENT)
Dept: OTHER | Facility: CLINIC | Age: 27
End: 2022-08-04

## 2022-08-04 NOTE — TELEPHONE ENCOUNTER
Dr Carter is not available on 8/5    Left voicemail with instructions for patient to call back to schedule their appointment(s)    August 4, 2022 , 12:08 PM

## 2022-10-09 ENCOUNTER — HEALTH MAINTENANCE LETTER (OUTPATIENT)
Age: 27
End: 2022-10-09

## 2022-11-16 ASSESSMENT — ASTHMA QUESTIONNAIRES
ACT_TOTALSCORE: 24
QUESTION_2 LAST FOUR WEEKS HOW OFTEN HAVE YOU HAD SHORTNESS OF BREATH: NOT AT ALL
QUESTION_3 LAST FOUR WEEKS HOW OFTEN DID YOUR ASTHMA SYMPTOMS (WHEEZING, COUGHING, SHORTNESS OF BREATH, CHEST TIGHTNESS OR PAIN) WAKE YOU UP AT NIGHT OR EARLIER THAN USUAL IN THE MORNING: NOT AT ALL
ACT_TOTALSCORE: 24
QUESTION_1 LAST FOUR WEEKS HOW MUCH OF THE TIME DID YOUR ASTHMA KEEP YOU FROM GETTING AS MUCH DONE AT WORK, SCHOOL OR AT HOME: NONE OF THE TIME
QUESTION_5 LAST FOUR WEEKS HOW WOULD YOU RATE YOUR ASTHMA CONTROL: WELL CONTROLLED
QUESTION_4 LAST FOUR WEEKS HOW OFTEN HAVE YOU USED YOUR RESCUE INHALER OR NEBULIZER MEDICATION (SUCH AS ALBUTEROL): NOT AT ALL

## 2022-11-17 ENCOUNTER — OFFICE VISIT (OUTPATIENT)
Dept: FAMILY MEDICINE | Facility: CLINIC | Age: 27
End: 2022-11-17
Payer: COMMERCIAL

## 2022-11-17 VITALS
WEIGHT: 117 LBS | RESPIRATION RATE: 18 BRPM | SYSTOLIC BLOOD PRESSURE: 114 MMHG | HEIGHT: 67 IN | TEMPERATURE: 98.6 F | OXYGEN SATURATION: 99 % | BODY MASS INDEX: 18.36 KG/M2 | DIASTOLIC BLOOD PRESSURE: 77 MMHG | HEART RATE: 88 BPM

## 2022-11-17 DIAGNOSIS — R07.9 CHEST PAIN, UNSPECIFIED TYPE: Primary | ICD-10-CM

## 2022-11-17 DIAGNOSIS — G43.019 INTRACTABLE MIGRAINE WITHOUT AURA AND WITHOUT STATUS MIGRAINOSUS: ICD-10-CM

## 2022-11-17 LAB
D DIMER PPP FEU-MCNC: 0.39 UG/ML FEU (ref 0–0.5)
ERYTHROCYTE [DISTWIDTH] IN BLOOD BY AUTOMATED COUNT: 11.7 % (ref 10–15)
HCT VFR BLD AUTO: 39.1 % (ref 35–47)
HGB BLD-MCNC: 14.1 G/DL (ref 11.7–15.7)
MCH RBC QN AUTO: 32.1 PG (ref 26.5–33)
MCHC RBC AUTO-ENTMCNC: 36.1 G/DL (ref 31.5–36.5)
MCV RBC AUTO: 89 FL (ref 78–100)
PLATELET # BLD AUTO: 209 10E3/UL (ref 150–450)
RBC # BLD AUTO: 4.39 10E6/UL (ref 3.8–5.2)
TSH SERPL DL<=0.005 MIU/L-ACNC: 0.78 UIU/ML (ref 0.3–4.2)
WBC # BLD AUTO: 4.7 10E3/UL (ref 4–11)

## 2022-11-17 PROCEDURE — 85027 COMPLETE CBC AUTOMATED: CPT | Performed by: FAMILY MEDICINE

## 2022-11-17 PROCEDURE — 93005 ELECTROCARDIOGRAM TRACING: CPT | Performed by: FAMILY MEDICINE

## 2022-11-17 PROCEDURE — 85379 FIBRIN DEGRADATION QUANT: CPT | Performed by: FAMILY MEDICINE

## 2022-11-17 PROCEDURE — 36415 COLL VENOUS BLD VENIPUNCTURE: CPT | Performed by: FAMILY MEDICINE

## 2022-11-17 PROCEDURE — 84443 ASSAY THYROID STIM HORMONE: CPT | Performed by: FAMILY MEDICINE

## 2022-11-17 PROCEDURE — 99214 OFFICE O/P EST MOD 30 MIN: CPT | Performed by: FAMILY MEDICINE

## 2022-11-17 RX ORDER — SUMATRIPTAN 50 MG/1
50 TABLET, FILM COATED ORAL
Qty: 30 TABLET | Refills: 3 | Status: SHIPPED | OUTPATIENT
Start: 2022-11-17 | End: 2023-06-22

## 2022-11-17 NOTE — PROGRESS NOTES
Assessment & Plan     Chest pain, unspecified type  Discussion lower threshold for testing for PE due to factor V Leiden.  D-dimer returns normal which is reassuring.  Discussed with patient.  I think PE unlikely and therefore would not proceed with CTA.  Wonder about anxiety or musculoskeletal pain component.  Discussed with patient with any further worsening would consider ED evaluation.  Patient agrees and expresses understanding.  - CBC with platelets  - D dimer, quantitative  - TSH with free T4 reflex  - EKG 12-lead, tracing only    Intractable migraine without aura and without status migrainosus  Refill of Imitrex.  - SUMAtriptan (IMITREX) 50 MG tablet  Dispense: 30 tablet; Refill: 3      Return in about 4 weeks (around 12/15/2022) for Follow up.    Edu Howard MD  [facility]    Luis Lake is a 27 year old, presenting for the following health issues:  Chest Pain (Chest pain/cramp toward the left side x 2wk )      History of Present Illness       Reason for visit:  Chest cramping  Symptom onset:  1-2 weeks ago  Symptoms include:  Heart cramping and tightening  Symptom intensity:  Mild  Symptom progression:  Staying the same  Had these symptoms before:  No    She eats 0-1 servings of fruits and vegetables daily.She consumes 0 sweetened beverage(s) daily.She exercises with enough effort to increase her heart rate 20 to 29 minutes per day.  She exercises with enough effort to increase her heart rate 3 or less days per week.      27-year-old female with concerns of a chest pain and cramping on the left side precordially.  Has been going on for the last 2 weeks.  Potentially worsened in the last 2 to 3 days.  Not substantially worsened with activities but she does experience some shortness of breath with walking up 2-3 flights of stairs.  This pain seems to be the worst and most noticeable while you are sitting at her desk working.  She is an .  Not associated with  "her use of triptans for migraine.  In fact she is out of those and requests a refill.    Past medical history significant for factor V Leiden, migraines, thoracic outlet syndrome.    Currently no contraception.  She does not believe she is pregnant.        Objective    /77 (BP Location: Right arm, Patient Position: Sitting, Cuff Size: Adult Regular)   Pulse 88   Temp 98.6  F (37  C) (Temporal)   Resp 18   Ht 1.71 m (5' 7.32\")   Wt 53.1 kg (117 lb)   SpO2 99%   BMI 18.15 kg/m    Body mass index is 18.15 kg/m .  Physical Exam   GENERAL: alert, not distressed  EARS: normal tympanic membranes and external auditory canals bilaterally  PHARYNX: no erythema or exudates  MOUTH: well hydrated mucosa, no lesions  NECK: no lymphadenopathy or thyroid nodules   CHEST: clear, no rales, rhonchi, or wheezes  CARDIAC: regular without murmur, gallop, or rub  ABDOMEN: soft, non tender, non distended, normal bowel sounds    Results for orders placed or performed in visit on 11/17/22   CBC with platelets     Status: Normal   Result Value Ref Range    WBC Count 4.7 4.0 - 11.0 10e3/uL    RBC Count 4.39 3.80 - 5.20 10e6/uL    Hemoglobin 14.1 11.7 - 15.7 g/dL    Hematocrit 39.1 35.0 - 47.0 %    MCV 89 78 - 100 fL    MCH 32.1 26.5 - 33.0 pg    MCHC 36.1 31.5 - 36.5 g/dL    RDW 11.7 10.0 - 15.0 %    Platelet Count 209 150 - 450 10e3/uL   D dimer, quantitative     Status: Normal   Result Value Ref Range    D-Dimer Quantitative 0.39 0.00 - 0.50 ug/mL FEU    Narrative    This D-dimer assay is intended for use in conjunction with a clinical pretest probability assessment model to exclude pulmonary embolism (PE) and deep venous thrombosis (DVT) in outpatients suspected of PE or DVT. The cut-off value is 0.50 ug/mL FEU.   TSH with free T4 reflex     Status: Normal   Result Value Ref Range    TSH 0.78 0.30 - 4.20 uIU/mL     EKG personally reviewed.  Nonspecific T wave changes.  Normal sinus rhythm.  No concerning ST or T wave.  No " strain pattern consistent with PE.

## 2023-02-18 ENCOUNTER — HEALTH MAINTENANCE LETTER (OUTPATIENT)
Age: 28
End: 2023-02-18

## 2023-05-15 ENCOUNTER — OFFICE VISIT (OUTPATIENT)
Dept: MIDWIFE SERVICES | Facility: CLINIC | Age: 28
End: 2023-05-15
Payer: COMMERCIAL

## 2023-05-15 VITALS
SYSTOLIC BLOOD PRESSURE: 114 MMHG | HEIGHT: 67 IN | DIASTOLIC BLOOD PRESSURE: 68 MMHG | BODY MASS INDEX: 19.21 KG/M2 | WEIGHT: 122.4 LBS

## 2023-05-15 DIAGNOSIS — Z01.419 ENCNTR FOR GYN EXAM (GENERAL) (ROUTINE) W/O ABN FINDINGS: Primary | ICD-10-CM

## 2023-05-15 PROCEDURE — 99395 PREV VISIT EST AGE 18-39: CPT | Performed by: ADVANCED PRACTICE MIDWIFE

## 2023-05-15 ASSESSMENT — ANXIETY QUESTIONNAIRES
IF YOU CHECKED OFF ANY PROBLEMS ON THIS QUESTIONNAIRE, HOW DIFFICULT HAVE THESE PROBLEMS MADE IT FOR YOU TO DO YOUR WORK, TAKE CARE OF THINGS AT HOME, OR GET ALONG WITH OTHER PEOPLE: SOMEWHAT DIFFICULT
6. BECOMING EASILY ANNOYED OR IRRITABLE: SEVERAL DAYS
2. NOT BEING ABLE TO STOP OR CONTROL WORRYING: SEVERAL DAYS
1. FEELING NERVOUS, ANXIOUS, OR ON EDGE: SEVERAL DAYS
GAD7 TOTAL SCORE: 5
GAD7 TOTAL SCORE: 5
7. FEELING AFRAID AS IF SOMETHING AWFUL MIGHT HAPPEN: NOT AT ALL
3. WORRYING TOO MUCH ABOUT DIFFERENT THINGS: SEVERAL DAYS
5. BEING SO RESTLESS THAT IT IS HARD TO SIT STILL: NOT AT ALL

## 2023-05-15 ASSESSMENT — PATIENT HEALTH QUESTIONNAIRE - PHQ9
SUM OF ALL RESPONSES TO PHQ QUESTIONS 1-9: 0
5. POOR APPETITE OR OVEREATING: SEVERAL DAYS

## 2023-05-15 NOTE — PROGRESS NOTES
.Aleksandra is a 27 year old No obstetric history on file. female who presents for annual exam.     Besides routine health maintenance, she has no other health concerns today .  HPI:   Training for [employer] in June.   Menses are regular q 20-21 days and  lasting 7 days. First 2 days are heavy and the other days are light.   Patient's last menstrual period was 04/24/2023.   Using none for contraception.    She is not currently considering pregnancy.    REPRODUCTIVE/GYNECOLOGIC HISTORY:  Aleksandra is sexually active with male partner(s) and is currently in monogamous relationship.   STI testing offered?  Declined  History of abnormal Pap smear:  No  SOCIAL HISTORY  Abuse: does not report having previously been physical or sexually abused.    Do you feel safe in your environment? YES       She  reports that she has never smoked. She has never used smokeless tobacco.      Last PHQ-9 score on record =       5/15/2023     2:34 PM   PHQ-9 SCORE   PHQ-9 Total Score 0     Last GAD7 score on record =       5/15/2023     2:34 PM   JUANCARLOS-7 SCORE   Total Score 5     Alcohol Score = 2    HEALTH MAINTENANCE:    Care Gaps      Overdue     Never   Done HEPATITIS C SCREENING (Once)     MAY 25   2022 COVID-19 Vaccine (4 - Pfizer series)  Last completed: Mar 30, 2022   SEP 1   2022 INFLUENZA VACCINE (1)  Last completed: Nov 24, 2021 NOV 24 2022 YEARLY PREVENTIVE VISIT (Yearly)  Last completed: Nov 24, 2021 JAN 1 2023 PHQ-2 (once per calendar year) (Yearly, January to December)  Last completed: Nov 17, 2022     Upcoming     NOV 24 2024 PAP (Every 3 Years)  Last completed: Nov 24, 2021 NOV 27 2026 ADVANCE CARE PLANNING (Every 5 Years)  Last completed: Nov 27, 2021 JAN 11 2029 DTAP/TDAP/TD IMMUNIZATION (6 - Td or Tdap)  Last completed: Jan 11, 2019               HEALTHY HABITS  Eating habits: eats regular meals and follows a balanced nutrition diet  Calcium/Vitamin D intake: source:  dairy Adequate? Probably  not  Exercise: How often do you exercise? 5-7 times/week; Cardio  Have you had an eye exam in the last two years? YES     Do you routinely see the dentist once or twice yearly? Yes      HISTORY:  OB History   No obstetric history on file.     Past Medical History:   Diagnosis Date     ALLERGIC RHINITIS NOS 06/19/2007    Ragweed, animal dander, grasses, trees     Encounter for IUD insertion 04/10/2018    Lucy IUD - remove by 4/10/2021     Exercise induced bronchospasm 06/19/2007    See respiratory therapy note - has firm dx of asthma exacerbated with exercise.     Factor 5 Leiden mutation, heterozygous (H) 08/2012    POSITIVE heterozygous MUTATION     Headache(784.0)     diagnosed by neurologist     Pancreatitis 02/18/2010     Uncomplicated asthma 2012     Past Surgical History:   Procedure Laterality Date     OPEN REDUCTION INTERNAL FIXATION ELBOW  8/23/2011    Procedure:OPEN REDUCTION INTERNAL FIXATION ELBOW; open reduction internal fixation left elbow; Surgeon:TOOTIE DRAKE; Location:[facility] OR     Family History   Problem Relation Age of Onset     Blood Disease Mother         POSITIVE FACTOR 5     Breast Cancer Maternal Grandmother      Lung Cancer Maternal Grandmother      Diabetes Maternal Grandmother      Hypertension Maternal Grandmother      Colon Cancer No family hx of      Prostate Cancer No family hx of      Social History     Socioeconomic History     Marital status: Single     Spouse name: None     Number of children: 0     Years of education: None     Highest education level: None   Occupational History     Occupation: student   Tobacco Use     Smoking status: Never     Smokeless tobacco: Never     Tobacco comments:     no smoking in the home   Vaping Use     Vaping status: Never Used     Passive vaping exposure: Yes   Substance and Sexual Activity     Alcohol use: Yes     Drug use: No     Sexual activity: Yes     Partners: Male     Birth control/protection: I.U.D.   Other Topics Concern      "Parent/sibling w/ CABG, MI or angioplasty before 65F 55M? No       Current Outpatient Medications:      albuterol (PROAIR HFA/PROVENTIL HFA/VENTOLIN HFA) 108 (90 Base) MCG/ACT inhaler, Inhale 2 puffs into the lungs every 4 hours as needed for shortness of breath / dyspnea, wheezing or other (cough), Disp: 18 g, Rfl: 1     magnesium 250 MG tablet, Take 1 tablet by mouth daily, Disp: , Rfl:      multivitamin w/minerals (THERA-VIT-M) tablet, Take 1 tablet by mouth daily, Disp: , Rfl:      SUMAtriptan (IMITREX) 50 MG tablet, Take 1 tablet (50 mg) by mouth at onset of headache for migraine May repeat in 2 hours. Max 4 tablets/24 hours., Disp: 30 tablet, Rfl: 3     propranolol (INDERAL) 20 MG tablet, Take 1 tablet (20 mg) by mouth daily as needed (anxiety) (Patient not taking: Reported on 5/15/2023), Disp: 30 tablet, Rfl: 1     Allergies   Allergen Reactions     No Known Allergies        Past medical, surgical, social and family history were reviewed and updated in EPIC.    ROS:   12 point review of systems negative other than symptoms noted below or in the HPI.    PHYSICAL EXAM:  /68   Ht 1.689 m (5' 6.5\")   Wt 55.5 kg (122 lb 6.4 oz)   LMP 04/24/2023   BMI 19.46 kg/m     BMI: Body mass index is 19.46 kg/m .  Constitutional: healthy, alert and no distress  Neck: symmetrical, thyroid normal size, no masses present, no lymphadenopathy present.   Cardiovascular: RRR, no murmurs present  Respiratory: breathing unlabored, lungs CTA bilaterally  Gastrointestinal: abdomen soft, non-tender, bowel sounds present    ASSESSMENT/PLAN:    ICD-10-CM    1. Encntr for gyn exam (general) (routine) w/o abn findings  Z01.419         No results found for any visits on 05/15/23.      COUNSELING:   Reviewed preventive health counseling, as reflected in patient instructions   reports that she has never smoked. She has never used smokeless tobacco.    Enjoy running [employer]!  Pap due next year.   No reason for labs this " year.    Donal Womack, DNP, APRN, CNM

## 2023-06-22 ENCOUNTER — OFFICE VISIT (OUTPATIENT)
Dept: FAMILY MEDICINE | Facility: CLINIC | Age: 28
End: 2023-06-22
Payer: COMMERCIAL

## 2023-06-22 VITALS
HEIGHT: 66 IN | HEART RATE: 71 BPM | TEMPERATURE: 98.1 F | BODY MASS INDEX: 19.3 KG/M2 | OXYGEN SATURATION: 100 % | WEIGHT: 120.1 LBS | RESPIRATION RATE: 16 BRPM | DIASTOLIC BLOOD PRESSURE: 68 MMHG | SYSTOLIC BLOOD PRESSURE: 110 MMHG

## 2023-06-22 DIAGNOSIS — Z00.00 ROUTINE GENERAL MEDICAL EXAMINATION AT A HEALTH CARE FACILITY: Primary | ICD-10-CM

## 2023-06-22 DIAGNOSIS — J45.20 MILD INTERMITTENT ASTHMA WITHOUT COMPLICATION: ICD-10-CM

## 2023-06-22 DIAGNOSIS — G43.019 INTRACTABLE MIGRAINE WITHOUT AURA AND WITHOUT STATUS MIGRAINOSUS: ICD-10-CM

## 2023-06-22 PROCEDURE — 99214 OFFICE O/P EST MOD 30 MIN: CPT | Mod: 25

## 2023-06-22 PROCEDURE — 99395 PREV VISIT EST AGE 18-39: CPT

## 2023-06-22 RX ORDER — SUMATRIPTAN 50 MG/1
50 TABLET, FILM COATED ORAL
Qty: 9 TABLET | Refills: 1 | Status: SHIPPED | OUTPATIENT
Start: 2023-06-22 | End: 2023-12-04

## 2023-06-22 RX ORDER — ALBUTEROL SULFATE 90 UG/1
2 AEROSOL, METERED RESPIRATORY (INHALATION) EVERY 4 HOURS PRN
Qty: 18 G | Refills: 1 | Status: SHIPPED | OUTPATIENT
Start: 2023-06-22

## 2023-06-22 ASSESSMENT — ANXIETY QUESTIONNAIRES
IF YOU CHECKED OFF ANY PROBLEMS ON THIS QUESTIONNAIRE, HOW DIFFICULT HAVE THESE PROBLEMS MADE IT FOR YOU TO DO YOUR WORK, TAKE CARE OF THINGS AT HOME, OR GET ALONG WITH OTHER PEOPLE: SOMEWHAT DIFFICULT
GAD7 TOTAL SCORE: 8
3. WORRYING TOO MUCH ABOUT DIFFERENT THINGS: SEVERAL DAYS
7. FEELING AFRAID AS IF SOMETHING AWFUL MIGHT HAPPEN: NOT AT ALL
GAD7 TOTAL SCORE: 8
6. BECOMING EASILY ANNOYED OR IRRITABLE: MORE THAN HALF THE DAYS
5. BEING SO RESTLESS THAT IT IS HARD TO SIT STILL: SEVERAL DAYS
2. NOT BEING ABLE TO STOP OR CONTROL WORRYING: SEVERAL DAYS
1. FEELING NERVOUS, ANXIOUS, OR ON EDGE: SEVERAL DAYS

## 2023-06-22 ASSESSMENT — ENCOUNTER SYMPTOMS
EYE PAIN: 0
COUGH: 0
NERVOUS/ANXIOUS: 0
DIARRHEA: 0
HEMATOCHEZIA: 0
BREAST MASS: 0
FEVER: 0
DIZZINESS: 0
CONSTIPATION: 0
HEADACHES: 0
ARTHRALGIAS: 0
DYSURIA: 0
CHILLS: 0
SORE THROAT: 0
HEARTBURN: 0
JOINT SWELLING: 0
PALPITATIONS: 0
MYALGIAS: 0
PARESTHESIAS: 0
NAUSEA: 0
HEMATURIA: 0
FREQUENCY: 0
SHORTNESS OF BREATH: 0
WEAKNESS: 0
ABDOMINAL PAIN: 0

## 2023-06-22 ASSESSMENT — PATIENT HEALTH QUESTIONNAIRE - PHQ9: 5. POOR APPETITE OR OVEREATING: MORE THAN HALF THE DAYS

## 2023-06-22 NOTE — PROGRESS NOTES
"   SUBJECTIVE:   CC: Aleksandra is an 27 year old who presents for preventive health visit.       6/22/2023     2:15 PM   Additional Questions   Roomed by RICHELLE Zhao     Healthy Habits:    Getting at least 3 servings of Calcium per day:  Yes    Bi-annual eye exam:  Yes    Dental care twice a year:  Yes    Sleep apnea or symptoms of sleep apnea:  None    Diet:  Regular (no restrictions)    Frequency of exercise:  2-3 days/week    Duration of exercise:  45-60 minutes    Taking medications regularly:  No    Barriers to taking medications:  None    Medication side effects:  Not applicable    PHQ-2 Total Score:    Additional concerns today:  Yes    Aleksandra tells me she has a history of migraines. She has had them since she was [age]. She does not get an aura with them. They are not changing in severity or frequency. She uses Imitrex 3-4 times per months and this helps stop the migraine. She does have more frequent migraines around her menses. Being off of birth control helped control her migraines. She also has Raynaud's which she knows Imitrex does not help. Nifedipine made her migraines worse and did not help her Raynaud's much. She has not tried any other therapies for her Raynaud's. The pain and symptoms can get \"pretty severe\" in the winter, but during the summer she does okay but when she is exercising (recently trained for a marathon) she does have flares of her Raynaud's.     She has an albuterol inhaler that she used for asthma when she was young. She still has intermittent symptoms of her asthma, mostly around humidity. She is having to use it more due to the smoke and warm weather, but usually less than daily and weekly. She does not get symptoms with activity. No night time symptoms. No     Social History     Tobacco Use     Smoking status: Never     Passive exposure: Never     Smokeless tobacco: Never     Tobacco comments:     no smoking in the home   Substance Use Topics     Alcohol use: Yes             " 6/22/2023     2:26 PM   Alcohol Use   Prescreen: >3 drinks/day or >7 drinks/week? No          No data to display              Reviewed orders with patient.  Reviewed health maintenance and updated orders accordingly - Yes    Breast Cancer Screening:    [facility]-7:       11/24/2021     1:16 PM 6/22/2023     7:40 AM   Breast CA Risk Assessment ([facility]-7)   Did any of your first-degree relatives have breast or ovarian cancer? Yes Yes   Did any of your relatives have bilateral breast cancer? Unknown No   Did any man in your family have breast cancer? Yes No   Did any woman in your family have breast and ovarian cancer? No Yes   Did any woman in your family have breast cancer before age 50 y? No No   Do you have 2 or more relatives with breast and/or ovarian cancer? No No   Do you have 2 or more relatives with breast and/or bowel cancer? No No     Pertinent mammograms are reviewed under the imaging tab.    History of abnormal Pap smear: NO - age 21-29 PAP every 3 years recommended      11/24/2021     1:53 PM 4/10/2018     3:02 PM   PAP / HPV   PAP Negative for Intraepithelial Lesion or Malignancy (NILM)     PAP (Historical)  NIL      Reviewed and updated as needed this visit by clinical staff   Tobacco  Allergies  Meds    Surg Hx  Fam Hx  Soc Hx        Reviewed and updated as needed this visit by Provider   Tobacco      Surg Hx  Fam Hx           Review of Systems   Constitutional: Negative for chills and fever.   HENT: Negative for congestion, ear pain, hearing loss and sore throat.    Eyes: Negative for pain and visual disturbance.   Respiratory: Negative for cough and shortness of breath.    Cardiovascular: Negative for chest pain, palpitations and peripheral edema.   Gastrointestinal: Negative for abdominal pain, constipation, diarrhea, heartburn, hematochezia and nausea.   Breasts:  Positive for discharge. Negative for tenderness and breast mass.   Genitourinary: Negative for dysuria, frequency, genital sores,  "hematuria, pelvic pain, urgency, vaginal bleeding and vaginal discharge.   Musculoskeletal: Negative for arthralgias, joint swelling and myalgias.   Skin: Negative for rash.   Neurological: Negative for dizziness, weakness, headaches and paresthesias.   Psychiatric/Behavioral: Negative for mood changes. The patient is not nervous/anxious.      OBJECTIVE:   /68 (BP Location: Right arm, Patient Position: Sitting, Cuff Size: Adult Small)   Pulse 71   Temp 98.1  F (36.7  C) (Oral)   Resp 16   Ht 1.668 m (5' 5.67\")   Wt 54.5 kg (120 lb 1.6 oz)   LMP  (LMP Unknown)   SpO2 100%   BMI 19.58 kg/m    Physical Exam  Vitals reviewed.   Constitutional:       General: She is not in acute distress.  HENT:      Right Ear: Tympanic membrane, ear canal and external ear normal.      Left Ear: Tympanic membrane, ear canal and external ear normal.   Eyes:      Extraocular Movements: Extraocular movements intact.      Pupils: Pupils are equal, round, and reactive to light.   Cardiovascular:      Rate and Rhythm: Normal rate and regular rhythm.      Pulses: Normal pulses.      Heart sounds: Normal heart sounds. No murmur heard.  Pulmonary:      Effort: Pulmonary effort is normal. No respiratory distress.      Breath sounds: No wheezing.   Abdominal:      General: Abdomen is flat. Bowel sounds are normal. There is no distension.   Musculoskeletal:         General: Normal range of motion.      Cervical back: Normal range of motion. No rigidity.      Right lower leg: No edema.      Left lower leg: No edema.   Lymphadenopathy:      Cervical: No cervical adenopathy.   Neurological:      General: No focal deficit present.      Mental Status: She is alert.      Cranial Nerves: No cranial nerve deficit.      Sensory: No sensory deficit.      Motor: No weakness.      Gait: Gait normal.   Psychiatric:         Mood and Affect: Mood normal.         Thought Content: Thought content normal.       ASSESSMENT/PLAN:   Routine general medical " examination at a health care facility  Patient presents for routine preventative visit. Vaginal discharge on ROS is her normal vaginal discharge and has not changed. Vital signs reviewed. Physical exam is without concerning findings. She is due for PAP next year. Breast cancer screening starting at 45.   - REVIEW OF HEALTH MAINTENANCE PROTOCOL ORDERS    Intractable migraine without aura and without status migrainosus  Chronic. Controlled w/ abortive treatment prn. No need for preventive treatment at this point. Continue with sumatriptan PRN. If migraines worsen, change in quality or severity, are more frequent, or sumatriptan stops providing relief would want to reevaluate. Neurological exam today is without atypical findings.   - SUMAtriptan (IMITREX) 50 MG tablet; Take 1 tablet (50 mg) by mouth at onset of headache for migraine May repeat in 2 hours. Max 4 tablets/24 hours.    Mild intermittent asthma without complication  Well controlled. Maintain current treatment with PRN albuterol. If symptoms become more prevalent, increased nightime symptoms, or worsening cough/wheezing/SOB would want to reevaluate. Last spirometry was in . Will want to repeat this in near future.   - albuterol (PROAIR HFA/PROVENTIL HFA/VENTOLIN HFA) 108 (90 Base) MCG/ACT inhaler; Inhale 2 puffs into the lungs every 4 hours as needed for shortness of breath, wheezing or other (cough)  - Pulmonary function testing    Patient has been advised of split billing requirements and indicates understanding: Yes    COUNSELING:  Reviewed preventive health counseling, as reflected in patient instructions       Regular exercise       Healthy diet/nutrition       Contraception       Consider Hep C screening for all patients one time for ages 18-79 years       HIV screeninx in teen years, 1x in adult years, and at intervals if high risk      She reports that she has never smoked. She has never been exposed to tobacco smoke. She has never used  smokeless tobacco.    At the end of the visit, I confirmed understanding of what was discussed. Patient has no further questions or concerns that were brought up at this time.     Hina Mathew, SARA, APRN, FNP-C

## 2023-08-11 ENCOUNTER — TELEPHONE (OUTPATIENT)
Dept: FAMILY MEDICINE | Facility: CLINIC | Age: 28
End: 2023-08-11
Payer: COMMERCIAL

## 2023-08-11 NOTE — TELEPHONE ENCOUNTER
Please call with message below: She has not checked MyChart:     I wanted to check back in because after reviewing your note from our visit a few weeks ago I noteiced your anxiety levels were high and you reported this was a concern of yours. Should we have a follow-up phone call to discuss this? (Please help her schedule if she does).      Also, I wanted to let you know I ordered pulmonary function testing for monitoring your asthma. You last had this done in 2007. They should be calling to schedule this, no rush in completing this but we should get more up to date readings.     Hina Mathew DNP, APRN, FNP-C

## 2023-08-16 NOTE — TELEPHONE ENCOUNTER
Pt viewed msg:   Last read by Aleksandra Lucas at  4:52 PM on 8/12/2023.       Closing encounter.    Curry Doe Jr., CMA on 8/16/2023 at 12:35 PM

## 2023-11-27 ENCOUNTER — MYC MEDICAL ADVICE (OUTPATIENT)
Dept: FAMILY MEDICINE | Facility: CLINIC | Age: 28
End: 2023-11-27
Payer: COMMERCIAL

## 2023-12-03 DIAGNOSIS — G43.019 INTRACTABLE MIGRAINE WITHOUT AURA AND WITHOUT STATUS MIGRAINOSUS: ICD-10-CM

## 2023-12-04 RX ORDER — SUMATRIPTAN 50 MG/1
TABLET, FILM COATED ORAL
Qty: 9 TABLET | Refills: 0 | Status: SHIPPED | OUTPATIENT
Start: 2023-12-04 | End: 2023-12-06

## 2023-12-05 ASSESSMENT — ASTHMA QUESTIONNAIRES: ACT_TOTALSCORE: 24

## 2023-12-06 ENCOUNTER — OFFICE VISIT (OUTPATIENT)
Dept: FAMILY MEDICINE | Facility: CLINIC | Age: 28
End: 2023-12-06
Payer: COMMERCIAL

## 2023-12-06 VITALS
TEMPERATURE: 98 F | HEART RATE: 78 BPM | HEIGHT: 66 IN | SYSTOLIC BLOOD PRESSURE: 112 MMHG | RESPIRATION RATE: 16 BRPM | OXYGEN SATURATION: 100 % | DIASTOLIC BLOOD PRESSURE: 70 MMHG | BODY MASS INDEX: 19.2 KG/M2 | WEIGHT: 119.5 LBS

## 2023-12-06 DIAGNOSIS — G43.009 MIGRAINE WITHOUT AURA AND WITHOUT STATUS MIGRAINOSUS, NOT INTRACTABLE: Primary | ICD-10-CM

## 2023-12-06 DIAGNOSIS — D68.51 FACTOR 5 LEIDEN MUTATION, HETEROZYGOUS (H): ICD-10-CM

## 2023-12-06 PROCEDURE — 99215 OFFICE O/P EST HI 40 MIN: CPT

## 2023-12-06 RX ORDER — SUMATRIPTAN 100 MG/1
100 TABLET, FILM COATED ORAL
Qty: 10 TABLET | Refills: 0 | Status: SHIPPED | OUTPATIENT
Start: 2023-12-06 | End: 2024-09-06

## 2023-12-06 ASSESSMENT — ENCOUNTER SYMPTOMS
PARESTHESIAS: 0
WEAKNESS: 0
HEADACHES: 1
NUMBNESS: 0

## 2023-12-06 ASSESSMENT — PAIN SCALES - GENERAL: PAINLEVEL: NO PAIN (0)

## 2023-12-06 ASSESSMENT — HEADACHE IMPACT TEST (HIT 6)
HIT6 TOTAL SCORE: 74
HOW OFTEN DID HEADACHS LIMIT CONCENTRATION ON WORK OR DAILY ACTIVITY: VERY OFTEN
HOW OFTEN HAVE YOU FELT FED UP OR IRRITATED BECAUSE OF YOUR HEADACHES: ALWAYS
HOW OFTEN DO HEADACHES LIMIT YOUR DAILY ACTIVITIES: ALWAYS
WHEN YOU HAVE A HEADACHE HOW OFTEN DO YOU WISH YOU COULD LIE DOWN: VERY OFTEN
HOW OFTEN HAVE YOU FELT TOO TIRED TO WORK BECAUSE OF YOUR HEADACHES: ALWAYS
WHEN YOU HAVE HEADACHES HOW OFTEN IS THE PAIN SEVERE: ALWAYS

## 2023-12-06 NOTE — PROGRESS NOTES
Assessment & Plan     Migraine without aura and without status migrainosus, not intractable  After discussion, patient would like to increase sumatriptan to 100 mg dose and see neurology. No new red flags with her headaches and seems like a flare of her chronic headaches. She does not want to do preventative medication as she reports trying many in the past with no benefit  (EHR reveals nifedipine, propranolol, nortriptyline, topiramate). If no benefit we could try Ubrelvy, but patient declines at this time. Will follow with neurology for recommendations. No neurological findings of concern on physical exam today.   - SUMAtriptan (IMITREX) 100 MG tablet; Take 1 tablet (100 mg) by mouth at onset of headache for migraine  - Adult Neurology  Referral; Future    Factor 5 Leiden mutation, heterozygous (H24)  Chronic diagnosis. No history of blood clots.     Luis Lake is a 28 year old, presenting for the following health issues:  Headache    Has had migraines since little. Was on medication but then stopped because the migraines seemed to improve. However, in high school and college they worsened. From the beginning of November she was having worsening headaches. Today, it is still lingering but not as bad. Some have been different and in the front of her forehead, but usually they are on her temple or behind her eyes. She did go to the eye doctor and got a new prescription (slight change). She can see without her glasses but is trying to use them every day to try and help the headaches. She does note an association with her period, but this month the headache has not been changing with her period. She tried a daily medication in college and there was no difference. No head injury, no falls. No LOC. No vision changes. Not worse with certain positions. Not related to exercise. She is sleeping well. Drinks about 4 cups of coffee per day. This is typical and has not changed. Water intake is good. She  "does have some benefit with 100 mg of sumatriptan. She also did take her mom's Ubrlevy and found benefit.         12/6/2023     8:57 AM   Additional Questions   Roomed by Monica MARTINEZ CMA   Accompanied by self         12/6/2023     8:57 AM   Patient Reported Additional Medications   Patient reports taking the following new medications na       History of Present Illness       Reason for visit:  Migraines    She eats 2-3 servings of fruits and vegetables daily.She consumes 0 sweetened beverage(s) daily.She exercises with enough effort to increase her heart rate 30 to 60 minutes per day.  She exercises with enough effort to increase her heart rate 3 or less days per week.   She is taking medications regularly.     Migraine     Since your last clinic visit, how have your headaches changed?  Worsened  How often are you getting headaches or migraines?  2-4 times per week   Are you able to do normal daily activities when you have a migraine? Yes  Are you taking rescue/relief medications? (Select all that apply) sumatriptan (Imitrex)  How helpful is your rescue/relief medication?  The relief is inconsistent--takes 2 for it to work  Are you taking any medications to prevent migraines? (Select all that apply)  No  In the past 4 weeks, how often have you gone to urgent care or the emergency room because of your headaches?  0    Review of Systems   Constitutional:  Negative for activity change, appetite change and fatigue.   Eyes:  Negative for pain and visual disturbance.   Neurological:  Positive for headaches. Negative for dizziness, weakness, numbness and paresthesias.          Objective    /70 (BP Location: Right arm, Patient Position: Sitting, Cuff Size: Adult Small)   Pulse 78   Temp 98  F (36.7  C) (Oral)   Resp 16   Ht 5' 5.5\" (1.664 m)   Wt 119 lb 8 oz (54.2 kg)   LMP  (LMP Unknown)   SpO2 100%   Breastfeeding No   BMI 19.58 kg/m    Body mass index is 19.58 kg/m .  Physical Exam  Constitutional:       " General: She is not in acute distress.  Neurological:      General: No focal deficit present.      Mental Status: She is alert. Mental status is at baseline.      Cranial Nerves: No cranial nerve deficit.      Motor: No weakness, tremor, atrophy, abnormal muscle tone or pronator drift.      Coordination: Romberg sign negative.      Gait: Gait normal.   Psychiatric:         Mood and Affect: Mood normal.         Thought Content: Thought content normal.        At the end of the visit, I confirmed understanding of what was discussed. Aleksandra has no further questions or concerns that were brought up at this time.     I spent a total of 43 minutes on the day of the visit.   Time spent by me doing chart review, history and exam, documentation, education and further activities per the note     Hina Whitten DNP, APRN, FNP-C

## 2023-12-15 ENCOUNTER — OFFICE VISIT (OUTPATIENT)
Dept: NEUROLOGY | Facility: CLINIC | Age: 28
End: 2023-12-15
Payer: COMMERCIAL

## 2023-12-15 VITALS
WEIGHT: 120 LBS | SYSTOLIC BLOOD PRESSURE: 117 MMHG | DIASTOLIC BLOOD PRESSURE: 80 MMHG | BODY MASS INDEX: 19.29 KG/M2 | HEIGHT: 66 IN | HEART RATE: 85 BPM | OXYGEN SATURATION: 100 %

## 2023-12-15 DIAGNOSIS — G43.009 MIGRAINE WITHOUT AURA AND WITHOUT STATUS MIGRAINOSUS, NOT INTRACTABLE: ICD-10-CM

## 2023-12-15 DIAGNOSIS — G43.E09 CHRONIC MIGRAINE WITH AURA WITHOUT STATUS MIGRAINOSUS, NOT INTRACTABLE: Primary | ICD-10-CM

## 2023-12-15 PROCEDURE — 99417 PROLNG OP E/M EACH 15 MIN: CPT | Performed by: PSYCHIATRY & NEUROLOGY

## 2023-12-15 PROCEDURE — 99205 OFFICE O/P NEW HI 60 MIN: CPT | Performed by: PSYCHIATRY & NEUROLOGY

## 2023-12-15 RX ORDER — PROCHLORPERAZINE MALEATE 10 MG
10 TABLET ORAL EVERY 6 HOURS PRN
Qty: 10 TABLET | Refills: 11 | Status: SHIPPED | OUTPATIENT
Start: 2023-12-15 | End: 2024-06-04

## 2023-12-15 RX ORDER — RIZATRIPTAN BENZOATE 10 MG/1
10 TABLET, ORALLY DISINTEGRATING ORAL
Qty: 18 TABLET | Refills: 11 | Status: SHIPPED | OUTPATIENT
Start: 2023-12-15 | End: 2024-09-06

## 2023-12-15 NOTE — PROGRESS NOTES
[facility]   Virtual Headache Neurology Consult  December 15, 2023     Aleksandra Lucas MRN# [id number]   YOB: 1995 Age: 28 year old     Requesting physician: Leopoldo Whitten CNP         Assessment and Recommendations:     Aleksandra Lucas is a 28 year old female who presents for further evaluation of headache.    Her headache presentation meets criteria for chronic migraine.  I suspect she has this on a genetic basis, as there is a strong family history on her mother side.  Her presentation is complicated by planning for pregnancy in the next 1 to 2 years.    Her neurologic examination is intact today.  Previous head imaging was completed in 2017, which was unrevealing for structural causes.  -I did not recommend any further evaluation for secondary causes of headache today.    I recommend a symptomatic headache plan, with acute and preventative therapies.  -For acute treatment of mild headache, she will continue over the counter medication as needed, not to exceed more than 14 days per month to avoid medication overuse.  -For acute treatment of moderate or severe headache, I recommend a trial of rizatriptan 10 mg ODT at the onset of headache, with a repeat dose in 2 hours if needed. This should not exceed more than 9 days per month to avoid medication overuse.  -For headache related nausea or as a rescue medication for headache, I recommend prochlorperazine 10 mg with or without diphenhydramine 25 mg.    -If needed, Ubrelvy could be considered in the future, but not during pregnancy.    For headache prevention, we discussed the following:  -Magnesium 400-800 mg daily and riboflavin 400 mg daily  -I recommend a trial of Botox using a chronic migraine protocol every 12 weeks.    I spent 78 minutes on record review, patient care, and documentation.    Lilliam Augustin MD  Neurology            Chief Complaint:     Chief Complaint   Patient presents with    Headache     migraine- ref by Maria Dolores  GUNNAR Mina CNP  Duration: increasing to almost daily. Has had them since childhood. Rescue meds tend to work if timed correctly. Location: temples, back of eyes and nose. Mostly R side but notes bilateral. Eye doctor was consulted - not wearing glasses will tend to trigger it. Medication: Sumatriptan works. 50mg now 100mg. OTC excedrine, Acetaminophen or liquid advil when out of rescue meds.            History is obtained from the patient and medical record.  Patient was seen via a virtual visit in their home.      Aleksandra Lucas is a 28 year old female who has lived with headaches since age 7. They were 15 days per month, then improved for a while, worsened again as a teen, in her 20's. They have become more frequent in the past 2 months again.    Headaches are retro-orbital, temporal, frontal, or sinus pressure (in the winter), neck tension. They are pressure-like, throbbing. They last all day. They rate 8/10.     She has associated photophobia, phonophobia, nausea, and vomiting.    She has visual aura, with blind spot, bright light lasting up to 2 hours and leading into headache.    She has 30/30 headache days per month, with 8-16/30 severe headache days per month.    She previously took propranolol, topiramate, and nortriptyline, without benefit.    In the ER in 2017, she received: 1 L IV normal saline, IV Benadryl, IV Compazine, IV Toradol, and IV magnesium.     She has sumatriptan 100 mg is a new prescription.    Previously, 50 mg was not sufficient. She had tingling with this dose and nausea, lethargy.  She previously tried Imitrex with benefit, but was not covered well by insurance.  She previously tried naratriptan with less effect.    She takes Excedrin, liquid Advil, acetaminophen.    She does not have a nausea medication.     She is UTD on eye exam; has astigmatism.             Past Medical History:     Past Medical History:   Diagnosis Date    ALLERGIC RHINITIS NOS 06/19/2007    Ragweed, animal  dander, grasses, trees    Factor 5 Leiden mutation, heterozygous (H24) 08/2012    POSITIVE heterozygous MUTATION    Headache(784.0)     diagnosed by neurologist    Pancreatitis 02/18/2010    Uncomplicated asthma 2012    Asthma - well controlled currently, worse in humidity            Past Surgical History:     Past Surgical History:   Procedure Laterality Date    OPEN REDUCTION INTERNAL FIXATION ELBOW  8/23/2011    Procedure:OPEN REDUCTION INTERNAL FIXATION ELBOW; open reduction internal fixation left elbow; Surgeon:TOOTIE DRAKE; Location: OR             Social History:     She is engaged, no children. Considering pregnancy in the next few years.    She is working full time as an .  She sometimes misses work due to headache (2 days missed in a few months) or needs to work from home.    She denies smoking, drinks alcohol 1 drink per week, no drug use.     She drinks caffeine daily, about 3-4 cups of coffee.          Family History:   Mother with migraine - Aimovig and Ubrelvy, four uncles with migraine    Family History   Problem Relation Age of Onset    Factor V Leiden deficiency Mother     Breast Cancer Maternal Grandmother 75    Lung Cancer Maternal Grandmother     Diabetes Maternal Grandmother     Hypertension Maternal Grandmother     Factor V Leiden deficiency Maternal Grandmother     Heart Failure Paternal Grandfather     Migraines Maternal Uncle     Migraines Maternal Uncle     Colon Cancer No family hx of     Prostate Cancer No family hx of              Allergies:      Allergies   Allergen Reactions    No Known Allergies              Medications:     Current Outpatient Medications:     albuterol (PROAIR HFA/PROVENTIL HFA/VENTOLIN HFA) 108 (90 Base) MCG/ACT inhaler, Inhale 2 puffs into the lungs every 4 hours as needed for shortness of breath, wheezing or other (cough), Disp: 18 g, Rfl: 1    magnesium 250 MG tablet, Take 1 tablet by mouth daily, Disp: , Rfl:     multivitamin w/minerals  "(THERA-VIT-M) tablet, Take 1 tablet by mouth daily, Disp: , Rfl:     SUMAtriptan (IMITREX) 100 MG tablet, Take 1 tablet (100 mg) by mouth at onset of headache for migraine, Disp: 10 tablet, Rfl: 0    Melatonin  Calcium/D3          Physical Exam:   /80   Pulse 85   Ht 1.664 m (5' 5.5\")   Wt 54.4 kg (120 lb)   LMP  (LMP Unknown)   SpO2 100%   BMI 19.67 kg/m     Physical Exam:   General: NAD  Neurologic:   Mental Status Exam: Alert, awake and oriented to situation. No dysarthria. Speech of normal fluency.   Cranial Nerves: Funduscopic exam with clear disc margins bilaterally. Pupils equal, EOMs intact, no nystagmus, facial movements symmetric, hearing intact to conversation, tongue midline and fully mobile. No tongue atrophy or fasciculations.    Motor: No drift in upper extremities. Able to stand from a seated position without use of arms. No tremors or abnormal movements noted. 5/5 in upper and lower extremities.   Coordination: With arms outstretched, able to touch nose using index finger accurately bilaterally.  Normal finger tapping bilaterally.     Gait: Normal stance and casual gait.    Neck: Normal range of motion with lateral head movements and neck flexion.  Eyes: No conjunctival injection, no scleral icterus.           Data:     MRI brain (8/23/2017):  No acute pathology. No bleed or mass. No evidence for any  demyelinating disease.    "

## 2023-12-15 NOTE — LETTER
12/15/2023         RE: Aleksandra Lucas  [address] [address]        Dear Colleague,    Thank you for referring your patient, Aleksandra Lucas, to the [facility] NEUROLOGY CLINICS [facility]. Please see a copy of my visit note below.    [facility]   Virtual Headache Neurology Consult  December 15, 2023     Aleksandra Lucas MRN# [id number]   YOB: 1995 Age: 28 year old     Requesting physician: Leopoldo Whitten CNP         Assessment and Recommendations:     Aleksandra Lucas is a 28 year old female who presents for further evaluation of headache.    Her headache presentation meets criteria for chronic migraine.  I suspect she has this on a genetic basis, as there is a strong family history on her mother side.  Her presentation is complicated by planning for pregnancy in the next 1 to 2 years.    Her neurologic examination is intact today.  Previous head imaging was completed in 2017, which was unrevealing for structural causes.  -I did not recommend any further evaluation for secondary causes of headache today.    I recommend a symptomatic headache plan, with acute and preventative therapies.  -For acute treatment of mild headache, she will continue over the counter medication as needed, not to exceed more than 14 days per month to avoid medication overuse.  -For acute treatment of moderate or severe headache, I recommend a trial of rizatriptan 10 mg ODT at the onset of headache, with a repeat dose in 2 hours if needed. This should not exceed more than 9 days per month to avoid medication overuse.  -For headache related nausea or as a rescue medication for headache, I recommend prochlorperazine 10 mg with or without diphenhydramine 25 mg.    -If needed, Ubrelvy could be considered in the future, but not during pregnancy.    For headache prevention, we discussed the following:  -Magnesium 400-800 mg daily and riboflavin 400 mg daily  -I recommend a trial of Botox using a  chronic migraine protocol every 12 weeks.    I spent 78 minutes on record review, patient care, and documentation.    Lilliam Augustin MD  Neurology            Chief Complaint:     Chief Complaint   Patient presents with     Headache     migraine- ref by Leopoldo Whitten APRN CNP  Duration: increasing to almost daily. Has had them since childhood. Rescue meds tend to work if timed correctly. Location: temples, back of eyes and nose. Mostly R side but notes bilateral. Eye doctor was consulted - not wearing glasses will tend to trigger it. Medication: Sumatriptan works. 50mg now 100mg. OTC excedrine, Acetaminophen or liquid advil when out of rescue meds.            History is obtained from the patient and medical record.  Patient was seen via a virtual visit in their home.      Aleksandra Lucas is a 28 year old female who has lived with headaches since age 7. They were 15 days per month, then improved for a while, worsened again as a teen, in her 20's. They have become more frequent in the past 2 months again.    Headaches are retro-orbital, temporal, frontal, or sinus pressure (in the winter), neck tension. They are pressure-like, throbbing. They last all day. They rate 8/10.     She has associated photophobia, phonophobia, nausea, and vomiting.    She has visual aura, with blind spot, bright light lasting up to 2 hours and leading into headache.    She has 30/30 headache days per month, with 8-16/30 severe headache days per month.    She previously took propranolol, topiramate, and nortriptyline, without benefit.    In the ER in 2017, she received: 1 L IV normal saline, IV Benadryl, IV Compazine, IV Toradol, and IV magnesium.     She has sumatriptan 100 mg is a new prescription.    Previously, 50 mg was not sufficient. She had tingling with this dose and nausea, lethargy.  She previously tried Imitrex with benefit, but was not covered well by insurance.  She previously tried naratriptan with less effect.    She takes  Excedrin, liquid Advil, acetaminophen.    She does not have a nausea medication.     She is UTD on eye exam; has astigmatism.             Past Medical History:     Past Medical History:   Diagnosis Date     ALLERGIC RHINITIS NOS 06/19/2007    Ragweed, animal dander, grasses, trees     Factor 5 Leiden mutation, heterozygous (H24) 08/2012    POSITIVE heterozygous MUTATION     Headache(784.0)     diagnosed by neurologist     Pancreatitis 02/18/2010     Uncomplicated asthma 2012    Asthma - well controlled currently, worse in humidity            Past Surgical History:     Past Surgical History:   Procedure Laterality Date     OPEN REDUCTION INTERNAL FIXATION ELBOW  8/23/2011    Procedure:OPEN REDUCTION INTERNAL FIXATION ELBOW; open reduction internal fixation left elbow; Surgeon:TOOTIE DRAKE; Location: OR             Social History:     She is engaged, no children. Considering pregnancy in the next few years.    She is working full time as an .  She sometimes misses work due to headache (2 days missed in a few months) or needs to work from home.    She denies smoking, drinks alcohol 1 drink per week, no drug use.     She drinks caffeine daily, about 3-4 cups of coffee.          Family History:   Mother with migraine - Aimovig and Ubrelvy, four uncles with migraine    Family History   Problem Relation Age of Onset     Factor V Leiden deficiency Mother      Breast Cancer Maternal Grandmother 75     Lung Cancer Maternal Grandmother      Diabetes Maternal Grandmother      Hypertension Maternal Grandmother      Factor V Leiden deficiency Maternal Grandmother      Heart Failure Paternal Grandfather      Migraines Maternal Uncle      Migraines Maternal Uncle      Colon Cancer No family hx of      Prostate Cancer No family hx of              Allergies:      Allergies   Allergen Reactions     No Known Allergies              Medications:     Current Outpatient Medications:      albuterol (PROAIR HFA/PROVENTIL  "HFA/VENTOLIN HFA) 108 (90 Base) MCG/ACT inhaler, Inhale 2 puffs into the lungs every 4 hours as needed for shortness of breath, wheezing or other (cough), Disp: 18 g, Rfl: 1     magnesium 250 MG tablet, Take 1 tablet by mouth daily, Disp: , Rfl:      multivitamin w/minerals (THERA-VIT-M) tablet, Take 1 tablet by mouth daily, Disp: , Rfl:      SUMAtriptan (IMITREX) 100 MG tablet, Take 1 tablet (100 mg) by mouth at onset of headache for migraine, Disp: 10 tablet, Rfl: 0    Melatonin  Calcium/D3          Physical Exam:   /80   Pulse 85   Ht 1.664 m (5' 5.5\")   Wt 54.4 kg (120 lb)   LMP  (LMP Unknown)   SpO2 100%   BMI 19.67 kg/m     Physical Exam:   General: NAD  Neurologic:   Mental Status Exam: Alert, awake and oriented to situation. No dysarthria. Speech of normal fluency.   Cranial Nerves: Funduscopic exam with clear disc margins bilaterally. Pupils equal, EOMs intact, no nystagmus, facial movements symmetric, hearing intact to conversation, tongue midline and fully mobile. No tongue atrophy or fasciculations.    Motor: No drift in upper extremities. Able to stand from a seated position without use of arms. No tremors or abnormal movements noted. 5/5 in upper and lower extremities.   Coordination: With arms outstretched, able to touch nose using index finger accurately bilaterally.  Normal finger tapping bilaterally.     Gait: Normal stance and casual gait.    Neck: Normal range of motion with lateral head movements and neck flexion.  Eyes: No conjunctival injection, no scleral icterus.           Data:     MRI brain (8/23/2017):  No acute pathology. No bleed or mass. No evidence for any  demyelinating disease.      Again, thank you for allowing me to participate in the care of your patient.        Sincerely,        Lilliam Augustin MD  "

## 2023-12-15 NOTE — NURSING NOTE
"Aleksandra Lucas is a 28 year old female who presents for:  Chief Complaint   Patient presents with    Headache     migraine- ref by Leopoldo Whitten APRN CNP  Duration: increasing to almost daily. Has had them since childhood. Rescue meds tend to work if timed correctly. Location: temples, back of eyes and nose. Mostly R side but notes bilateral. Eye doctor was consulted - not wearing glasses will tend to trigger it. Medication: Sumatriptan works. 50mg now 100mg. OTC excedrine, Acetaminophen or liquid advil when out of rescue meds.         Initial Vitals:  /80   Pulse 85   Ht 1.664 m (5' 5.5\")   Wt 54.4 kg (120 lb)   LMP  (LMP Unknown)   SpO2 100%   BMI 19.67 kg/m   Estimated body mass index is 19.67 kg/m  as calculated from the following:    Height as of this encounter: 1.664 m (5' 5.5\").    Weight as of this encounter: 54.4 kg (120 lb).. Body surface area is 1.59 meters squared. BP completed using cuff size: joe Okeefe CMA    "

## 2023-12-16 ASSESSMENT — ENCOUNTER SYMPTOMS
EYE PAIN: 0
FATIGUE: 0
DIZZINESS: 0
APPETITE CHANGE: 0
ACTIVITY CHANGE: 0

## 2024-01-03 ENCOUNTER — TELEPHONE (OUTPATIENT)
Dept: NEUROLOGY | Facility: CLINIC | Age: 29
End: 2024-01-03
Payer: COMMERCIAL

## 2024-01-03 NOTE — TELEPHONE ENCOUNTER
Received documentation from [facility]    Medication: Botulinum Toxin Type A (BOTOX) 200 units injection 150 Units     Status: APPROVED    Dates: 12/18/23 - 06/14/24    Routing to CAM and HIM.    Deanna Hinson MA  [facility] Neurology [facility]

## 2024-01-10 ENCOUNTER — TELEPHONE (OUTPATIENT)
Dept: NEUROLOGY | Facility: CLINIC | Age: 29
End: 2024-01-10
Payer: COMMERCIAL

## 2024-01-10 NOTE — TELEPHONE ENCOUNTER
[facility] Call Center    Phone Message    May a detailed message be left on voicemail: yes     Reason for Call: Other: Aleksandra calling to request a call back to discuss her botox appointment that was canceled for tomorrow. Aleksandra stated that she received approval letter from [facility] on 12/26/23 stating that she was approved. Writer did see a message in chart from 1/3/24 at 9:47 am for Dr. Augustin, that also stated that Aleksandra's botox was approved. Aleksandra is inquiring if she would be able to get her appointment back and if not she would like the first available appointment at another clinic.     Action Taken: Message routed to:  Other: [facility] NE[facility]    Travel Screening: Not Applicable

## 2024-01-10 NOTE — TELEPHONE ENCOUNTER
Called patient to advise that we will need to cancel Botox Injections until insurance approves prior auth. Advised patient that we will schedule her for the next available which is 2/22/2024 at 1:00PM. This will ensure that she has a spot for injections. If appointment time does not work, advised patient to call or send mychart so we can get her rescheduled asap.      Mali Downing MA

## 2024-01-12 ENCOUNTER — OFFICE VISIT (OUTPATIENT)
Dept: NEUROLOGY | Facility: CLINIC | Age: 29
End: 2024-01-12
Payer: COMMERCIAL

## 2024-01-12 DIAGNOSIS — G43.E09 CHRONIC MIGRAINE WITH AURA WITHOUT STATUS MIGRAINOSUS, NOT INTRACTABLE: Primary | ICD-10-CM

## 2024-01-12 PROCEDURE — 64615 CHEMODENERV MUSC MIGRAINE: CPT

## 2024-01-12 NOTE — PROGRESS NOTES
"[facility]  Botulinum Toxin Procedure    Lilliam Augustin MD  Headache Neurology    January 12, 2024    Procedure:  OnabotulinumtoxinA injections for chronic migraine  Indication:  Chronic migraine    Ms. Lucas suffers from severe intractable headaches.  She was referred by Dr. Augustin for onabotulinumtoxinA injections for headache.  Risks, benefits, and alternatives were discussed.  All questions were answered and consent given.  She decided to proceed with the injections.     Headache history, from initial consult note: \"Headaches are retro-orbital, temporal, frontal, or sinus pressure (in the winter), neck tension. They are pressure-like, throbbing. They last all day. They rate 8/10. She has associated photophobia, phonophobia, nausea, and vomiting. She has visual aura, with blind spot, bright light lasting up to 2 hours and leading into headache.\"    Prior to initiation of botulinum toxin injections, Ms. Lucas reported 30 headache days per month, with 8-16 severe headache days per month. Her headaches are quite disabling and often interfere with her ability to function normally.    Date of last injections: first set of injections    She has attempted other migraine prophylactic treatments in the past, which have included: propranolol, topiramate, and nortriptyline.      She currently takes no other prescription medication for headache prevention.    Ms. Lucas's pain was assessed prior to the procedure.  She rated her pain today as 2 out of 10.    Procedural Pause: Procedural pause was conducted to verify correct patient identity, procedure to be performed, correct side and site, correct patient position, and special requirements. Appropriate hand hygiene was utilized, and each injection site was prepped with alcohol wipe or Chloraprep swab.     Procedure Details: 200 units of onabotulinumtoxinA was diluted in 4 mL 0.9% normal saline. A total of 150 units of onabotulinumtoxinA were injected using 30 gauge " 0.5 in needles into the muscles listed below. 50 units of onabotulinumtoxinA were wasted.     Injection Sites: Total = 150 units onabotulinumtoxinA      and Procerus muscles - 5 units into the left and right corrugators and 5 units into the procerus (15 units total)    Frontalis muscles - 5 units into the left superior frontalis and 5 units into the right superior frontalis (2 injection sites per muscle) (10 units total)    Temporalis muscles - 12.5 units into the left temporalis muscle and 12.5 units into the right temporalis muscle (2 injection sites per muscle) (25 units total)    Occipitalis muscles - 12.5 units into the left occipitalis muscle and 12.5 units into the right occipitalis muscle (2 injection sites per muscle) (25 units total)    Splenius Capitis muscles - 12.5 units into the left splenius capitis muscle and 12.5 units into the right splenius capitis muscle (2 injection sites per muscle, divided into 2/3 anteriorly and 1/3 posteriorly) (25 units total)      Trapezius muscles - 25 units into the left trapezius muscle and 25 units into the right trapezius muscle (3 injection sites per muscle, divided 5 units, 10 units, 10 units, medial to lateral) (50 units total)    Ms. Lucas tolerated the procedure well without immediate complications.  She will follow up in clinic for assessment of the effectiveness of treatment.  She did not report any change in her pain level after the botulinumtoxinA injection procedure.    Lilliam Augustin MD  Headache Neurology  [facility]

## 2024-01-12 NOTE — LETTER
"    1/12/2024         RE: Aleksandra Lucas  [address] [address]        Dear Colleague,    Thank you for referring your patient, Aleksandra Lucas, to the [facility] NEUROLOGY CLINICS [facility]. Please see a copy of my visit note below.    [facility]  Botulinum Toxin Procedure    Lilliam Augustin MD  Headache Neurology    January 12, 2024    Procedure:  OnabotulinumtoxinA injections for chronic migraine  Indication:  Chronic migraine    Ms. Lucas suffers from severe intractable headaches.  She was referred by Dr. Augustin for onabotulinumtoxinA injections for headache.  Risks, benefits, and alternatives were discussed.  All questions were answered and consent given.  She decided to proceed with the injections.     Headache history, from initial consult note: \"Headaches are retro-orbital, temporal, frontal, or sinus pressure (in the winter), neck tension. They are pressure-like, throbbing. They last all day. They rate 8/10. She has associated photophobia, phonophobia, nausea, and vomiting. She has visual aura, with blind spot, bright light lasting up to 2 hours and leading into headache.\"    Prior to initiation of botulinum toxin injections, Ms. Lucas reported 30 headache days per month, with 8-16 severe headache days per month. Her headaches are quite disabling and often interfere with her ability to function normally.    Date of last injections: first set of injections    She has attempted other migraine prophylactic treatments in the past, which have included: propranolol, topiramate, and nortriptyline.      She currently takes no other prescription medication for headache prevention.    Ms. Lucas's pain was assessed prior to the procedure.  She rated her pain today as 2 out of 10.    Procedural Pause: Procedural pause was conducted to verify correct patient identity, procedure to be performed, correct side and site, correct patient position, and special requirements. Appropriate " hand hygiene was utilized, and each injection site was prepped with alcohol wipe or Chloraprep swab.     Procedure Details: 200 units of onabotulinumtoxinA was diluted in 4 mL 0.9% normal saline. A total of 150 units of onabotulinumtoxinA were injected using 30 gauge 0.5 in needles into the muscles listed below. 50 units of onabotulinumtoxinA were wasted.     Injection Sites: Total = 150 units onabotulinumtoxinA      and Procerus muscles - 5 units into the left and right corrugators and 5 units into the procerus (15 units total)    Frontalis muscles - 5 units into the left superior frontalis and 5 units into the right superior frontalis (2 injection sites per muscle) (10 units total)    Temporalis muscles - 12.5 units into the left temporalis muscle and 12.5 units into the right temporalis muscle (2 injection sites per muscle) (25 units total)    Occipitalis muscles - 12.5 units into the left occipitalis muscle and 12.5 units into the right occipitalis muscle (2 injection sites per muscle) (25 units total)    Splenius Capitis muscles - 12.5 units into the left splenius capitis muscle and 12.5 units into the right splenius capitis muscle (2 injection sites per muscle, divided into 2/3 anteriorly and 1/3 posteriorly) (25 units total)      Trapezius muscles - 25 units into the left trapezius muscle and 25 units into the right trapezius muscle (3 injection sites per muscle, divided 5 units, 10 units, 10 units, medial to lateral) (50 units total)    Ms. Lucas tolerated the procedure well without immediate complications.  She will follow up in clinic for assessment of the effectiveness of treatment.  She did not report any change in her pain level after the botulinumtoxinA injection procedure.    Lilliam Augustin MD  Headache Neurology  [facility]      Again, thank you for allowing me to participate in the care of your patient.        Sincerely,        Lilliam Augustin MD

## 2024-02-07 ENCOUNTER — MYC MEDICAL ADVICE (OUTPATIENT)
Dept: MIDWIFE SERVICES | Facility: CLINIC | Age: 29
End: 2024-02-07
Payer: COMMERCIAL

## 2024-02-07 DIAGNOSIS — N92.6 MISSED PERIOD: Primary | ICD-10-CM

## 2024-02-07 NOTE — TELEPHONE ENCOUNTER
Cycle day 37 seems far enough out from when she should have had a period for a pregnancy test to be positive if she was positive.    Stress can definitely affect her cycle and one missed period is not overly concerning, but I understand her nervousness around this. My recommendation is for her to either repeat another HPT this week or she can come in for a blood hcg level. If the blood hcg level is negative we do not get overly concerned with missed periods until after 2-3 are missed, when previously cycles were regular and normal.   I have placed a hcg quant order. She can call to schedule a lab only visit at her convenience if she chooses that option.     Emerald MAHER CNM

## 2024-02-07 NOTE — TELEPHONE ENCOUNTER
Pt reports having unprotected sex.  Missed period in February, currently on CD 37.  Periods usually come every 23-25 days    Pt reports multiple negative pregnancy tests.  She is engaged and planning a wedding so does acknowledge some stress associated with that.    Did note bloating and cramping this past week, typical for her prior to a cycle but nothing came of it.    Would you like pt to continue to monitor with weekly UPT?  She is very nervous.  Routing pt [employer] message to provider to advise.  Gloria Lazo RN on 2/7/2024 at 2:01 PM

## 2024-03-27 ENCOUNTER — MYC MEDICAL ADVICE (OUTPATIENT)
Dept: NEUROLOGY | Facility: CLINIC | Age: 29
End: 2024-03-27
Payer: COMMERCIAL

## 2024-03-27 DIAGNOSIS — G43.019 INTRACTABLE MIGRAINE WITHOUT AURA AND WITHOUT STATUS MIGRAINOSUS: Primary | ICD-10-CM

## 2024-03-29 RX ORDER — METHYLPREDNISOLONE 4 MG
TABLET, DOSE PACK ORAL
Qty: 21 TABLET | Refills: 0 | Status: SHIPPED | OUTPATIENT
Start: 2024-03-29 | End: 2024-04-15

## 2024-03-29 NOTE — TELEPHONE ENCOUNTER
Per provider:    Most likely, Botox is wearing off and she is experiencing a return of headache. This may get better with each round of Botox (less wearing off time). It is ok to take naproxen 440 mg (2 Aleve) with the rizatriptan, to try to get more out of it. If she'd like, I can send in a Medrol dosepak for her too.    Alex XIAO

## 2024-04-12 ENCOUNTER — OFFICE VISIT (OUTPATIENT)
Dept: NEUROLOGY | Facility: CLINIC | Age: 29
End: 2024-04-12
Payer: COMMERCIAL

## 2024-04-12 DIAGNOSIS — G43.E09 CHRONIC MIGRAINE WITH AURA WITHOUT STATUS MIGRAINOSUS, NOT INTRACTABLE: Primary | ICD-10-CM

## 2024-04-12 PROCEDURE — 64615 CHEMODENERV MUSC MIGRAINE: CPT | Performed by: PSYCHIATRY & NEUROLOGY

## 2024-04-12 NOTE — LETTER
"    4/12/2024         RE: Aleksandra Lucas  [address] [address]        Dear Colleague,    Thank you for referring your patient, Aleksandra Lucas, to the [facility] NEUROLOGY CLINICS [facility]. Please see a copy of my visit note below.    [facility]  Botulinum Toxin Procedure    Lilliam Augustin MD  Headache Neurology    April 12, 2024    Procedure:  OnabotulinumtoxinA injections for chronic migraine  Indication:  Chronic migraine    Ms. Lucas suffers from severe intractable headaches.  She was referred by Dr. Augustin for onabotulinumtoxinA injections for headache.  Risks, benefits, and alternatives were discussed.  All questions were answered and consent given.  She decided to proceed with the injections.      Headache history, from initial consult note: \"Headaches are retro-orbital, temporal, frontal, or sinus pressure (in the winter), neck tension. They are pressure-like, throbbing. They last all day. They rate 8/10. She has associated photophobia, phonophobia, nausea, and vomiting. She has visual aura, with blind spot, bright light lasting up to 2 hours and leading into headache.\"     Prior to initiation of botulinum toxin injections, Ms. Lucas reported 30 headache days per month, with 8-16 severe headache days per month. Her headaches are quite disabling and often interfere with her ability to function normally.     Date of last injections: 1/12/2024, second set of injections today.    Today, she reports significantly less headaches, and no severe headaches when Botox was active. Then, headaches returned as Botox wore off, about 3 weeks ago.      She has attempted other migraine prophylactic treatments in the past, which have included: propranolol, topiramate, and nortriptyline.       She currently takes no other prescription medication for headache prevention.    Procedural Pause: Procedural pause was conducted to verify correct patient identity, procedure to be performed, " correct side and site, correct patient position, and special requirements. Appropriate hand hygiene was utilized, and each injection site was prepped with alcohol wipe or Chloraprep swab.     Procedure Details: 200 units of onabotulinumtoxinA was diluted in 4 mL 0.9% normal saline. A total of 150 units of onabotulinumtoxinA were injected using 30 gauge 0.5 in needles into the muscles listed below. 50 units of onabotulinumtoxinA were wasted.     Injection Sites: Total = 150 units onabotulinumtoxinA      and Procerus muscles - 5 units into the left and right corrugators and 5 units into the procerus (15 units total)    Frontalis muscles - 5 units into the left superior frontalis and 5 units into the right superior frontalis (2 injection sites per muscle) (10 units total)    Temporalis muscles - 12.5 units into the left temporalis muscle and 12.5 units into the right temporalis muscle (2 injection sites per muscle) (25 units total)    Occipitalis muscles - 12.5 units into the left occipitalis muscle and 12.5 units into the right occipitalis muscle (2 injection sites per muscle) (25 units total)    Splenius Capitis muscles - 12.5 units into the left splenius capitis muscle and 12.5 units into the right splenius capitis muscle (2 injection sites per muscle, divided into 2/3 anteriorly and 1/3 posteriorly) (25 units total)      Trapezius muscles - 25 units into the left trapezius muscle and 25 units into the right trapezius muscle (3 injection sites per muscle, divided 5 units, 10 units, 10 units, medial to lateral) (50 units total)    Ms. Lucas tolerated the procedure well without immediate complications.  She will follow up in clinic for assessment of the effectiveness of treatment.  She did not report any change in her pain level after the botulinumtoxinA injection procedure.    Lilliam Augustin MD  Headache Neurology  [facility]      Again, thank you for allowing me to participate in the care of  your patient.        Sincerely,        Lilliam Augustni MD

## 2024-04-12 NOTE — PROGRESS NOTES
"[facility]  Botulinum Toxin Procedure    Lilliam Augustin MD  Headache Neurology    April 12, 2024    Procedure:  OnabotulinumtoxinA injections for chronic migraine  Indication:  Chronic migraine    Ms. Lucas suffers from severe intractable headaches.  She was referred by Dr. Augustin for onabotulinumtoxinA injections for headache.  Risks, benefits, and alternatives were discussed.  All questions were answered and consent given.  She decided to proceed with the injections.      Headache history, from initial consult note: \"Headaches are retro-orbital, temporal, frontal, or sinus pressure (in the winter), neck tension. They are pressure-like, throbbing. They last all day. They rate 8/10. She has associated photophobia, phonophobia, nausea, and vomiting. She has visual aura, with blind spot, bright light lasting up to 2 hours and leading into headache.\"     Prior to initiation of botulinum toxin injections, Ms. Lucas reported 30 headache days per month, with 8-16 severe headache days per month. Her headaches are quite disabling and often interfere with her ability to function normally.     Date of last injections: 1/12/2024, second set of injections today.    Today, she reports significantly less headaches, and no severe headaches when Botox was active. Then, headaches returned as Botox wore off, about 3 weeks ago.      She has attempted other migraine prophylactic treatments in the past, which have included: propranolol, topiramate, and nortriptyline.       She currently takes no other prescription medication for headache prevention.    Procedural Pause: Procedural pause was conducted to verify correct patient identity, procedure to be performed, correct side and site, correct patient position, and special requirements. Appropriate hand hygiene was utilized, and each injection site was prepped with alcohol wipe or Chloraprep swab.     Procedure Details: 200 units of onabotulinumtoxinA was diluted in 4 mL 0.9% " normal saline. A total of 150 units of onabotulinumtoxinA were injected using 30 gauge 0.5 in needles into the muscles listed below. 50 units of onabotulinumtoxinA were wasted.     Injection Sites: Total = 150 units onabotulinumtoxinA      and Procerus muscles - 5 units into the left and right corrugators and 5 units into the procerus (15 units total)    Frontalis muscles - 5 units into the left superior frontalis and 5 units into the right superior frontalis (2 injection sites per muscle) (10 units total)    Temporalis muscles - 12.5 units into the left temporalis muscle and 12.5 units into the right temporalis muscle (2 injection sites per muscle) (25 units total)    Occipitalis muscles - 12.5 units into the left occipitalis muscle and 12.5 units into the right occipitalis muscle (2 injection sites per muscle) (25 units total)    Splenius Capitis muscles - 12.5 units into the left splenius capitis muscle and 12.5 units into the right splenius capitis muscle (2 injection sites per muscle, divided into 2/3 anteriorly and 1/3 posteriorly) (25 units total)      Trapezius muscles - 25 units into the left trapezius muscle and 25 units into the right trapezius muscle (3 injection sites per muscle, divided 5 units, 10 units, 10 units, medial to lateral) (50 units total)    Ms. Lucas tolerated the procedure well without immediate complications.  She will follow up in clinic for assessment of the effectiveness of treatment.  She did not report any change in her pain level after the botulinumtoxinA injection procedure.    Lilliam Augustin MD  Headache Neurology  [facility]

## 2024-04-15 DIAGNOSIS — G43.019 INTRACTABLE MIGRAINE WITHOUT AURA AND WITHOUT STATUS MIGRAINOSUS: Primary | ICD-10-CM

## 2024-04-15 RX ORDER — METHYLPREDNISOLONE 4 MG
TABLET, DOSE PACK ORAL
Qty: 21 TABLET | Refills: 0 | Status: SHIPPED | OUTPATIENT
Start: 2024-04-15 | End: 2024-09-06

## 2024-06-04 ENCOUNTER — OFFICE VISIT (OUTPATIENT)
Dept: MIDWIFE SERVICES | Facility: CLINIC | Age: 29
End: 2024-06-04
Payer: COMMERCIAL

## 2024-06-04 VITALS
DIASTOLIC BLOOD PRESSURE: 68 MMHG | HEIGHT: 66 IN | BODY MASS INDEX: 19.61 KG/M2 | WEIGHT: 122 LBS | SYSTOLIC BLOOD PRESSURE: 114 MMHG

## 2024-06-04 DIAGNOSIS — Z12.4 PAP SMEAR FOR CERVICAL CANCER SCREENING: ICD-10-CM

## 2024-06-04 DIAGNOSIS — Z01.419 WELL WOMAN EXAM WITH ROUTINE GYNECOLOGICAL EXAM: Primary | ICD-10-CM

## 2024-06-04 DIAGNOSIS — D68.51 FACTOR 5 LEIDEN MUTATION, HETEROZYGOUS (H): ICD-10-CM

## 2024-06-04 PROCEDURE — 99395 PREV VISIT EST AGE 18-39: CPT | Performed by: ADVANCED PRACTICE MIDWIFE

## 2024-06-04 PROCEDURE — G0145 SCR C/V CYTO,THINLAYER,RESCR: HCPCS | Performed by: ADVANCED PRACTICE MIDWIFE

## 2024-06-04 NOTE — PROGRESS NOTES
Aleksandra is a 28 year old No obstetric history on file. female who presents for annual exam.     Besides routine health maintenance, she has no other health concerns today .    HPI:  Feeling great overall. Getting  in [facility] on November 1st. Been with her partner for 7 years.     She ran [employer] in 2023 in about 4 hrs 40 min. She is running the half marathon this year.     She tracks her cycles. Been on progesterone therapy in past (has Factor 5), but is happy just tracking cycles. No plans for kids for at least the next few years. She has had a few times (January and June) where the frequency has increased to 36 days. This past menses was very painful with cramps and very heavy.        GYNECOLOGIC HISTORY:    Patient's last menstrual period was 06/03/2024.    Regular menses? yes  Menses every 28-30 days.  Length of menses: 6 days    Her current contraception method is: none.  She  reports that she has never smoked. She has never been exposed to tobacco smoke. She has never used smokeless tobacco.    Patient is sexually active.  STD testing offered?  Declined  Last PHQ-9 score on record =       5/15/2023     2:34 PM   PHQ-9 SCORE   PHQ-9 Total Score 0     Last GAD7 score on record =       6/22/2023     3:15 PM   JUANCARLOS-7 SCORE   Total Score 8       HEALTH MAINTENANCE:  Cholesterol: (  Cholesterol   Date Value Ref Range Status   02/18/2010 140 0 - 200 mg/dL Final     Comment:     LDL Cholesterol is the primary guide to therapy.   The NCEP recommends further evaluation of: patients with cholesterol <200   mg/dL   if additional risk factors are present, cholesterol >240 mg/dL, triglycerides   >150 mg/dL, or HDL <40 mg/dL.      Pap:   Lab Results   Component Value Date    GYNINTERP  11/24/2021     Negative for Intraepithelial Lesion or Malignancy (NILM)    PAP NIL 04/10/2018     Health maintenance updated:  yes    Care Gaps    Overdue          Never done Pneumococcal Vaccine: Pediatrics (0 to 5  Years) and At-Risk Patients (6 to 64 Years) (1 of 2 - PCV)     APR 17 2021 ASTHMA ACTION PLAN (Yearly)  Last completed: Apr 17, 2020     SEP 1  2023 COVID-19 Vaccine (4 - 2023-24 season)  Last completed: Mar 30, 2022         Due Soon          JUN 6 2024 ASTHMA CONTROL TEST (Every 6 Months)  Last completed: Dec 6, 2023     ALBERT 22  2024 YEARLY PREVENTIVE VISIT (Yearly)  Last completed: Jun 22, 2023 NOV 24 2024 PAP (Every 3 Years)   Order placed this encounter         Upcoming          SEP 1  2024 INFLUENZA VACCINE (Season Ended)  Last completed: Nov 24, 2021     DEC 6  2024 HEPATITIS C SCREENING (Once)     Postponed by Leopoldo Whitten APRN CNP (Other)     NOV 27 2026 ADVANCE CARE PLANNING (Every 5 Years)  Last completed: Nov 27, 2021 JAN 11 2029 DTAP/TDAP/TD IMMUNIZATION (6 - Td or Tdap)  Last completed: Jan 11, 2019       HISTORY:  OB History   No obstetric history on file.       Patient Active Problem List   Diagnosis    Acne    Factor 5 Leiden mutation, heterozygous (H24)    Intractable migraine without aura and without status migrainosus     Past Surgical History:   Procedure Laterality Date    OPEN REDUCTION INTERNAL FIXATION ELBOW  8/23/2011    Procedure:OPEN REDUCTION INTERNAL FIXATION ELBOW; open reduction internal fixation left elbow; Surgeon:TOOTIE DRAKE; Location: OR      Social History     Tobacco Use    Smoking status: Never     Passive exposure: Never    Smokeless tobacco: Never    Tobacco comments:     no smoking in the home   Substance Use Topics    Alcohol use: Yes      Problem (# of Occurrences) Relation (Name,Age of Onset)    Heart Failure (1) Paternal Grandfather    Diabetes (1) Maternal Grandmother (Mima)    Hypertension (1) Maternal Grandmother (Mima)    Breast Cancer (1) Maternal Grandmother (Mima, 75)    Factor V Leiden deficiency (2) Mother, Maternal Grandmother (Mima)    Migraines (2) Maternal Uncle, Maternal Uncle    Lung Cancer (1) Maternal Grandmother  (Mima)           Negative family history of: Colon Cancer, Prostate Cancer              Current Outpatient Medications   Medication Sig Dispense Refill    albuterol (PROAIR HFA/PROVENTIL HFA/VENTOLIN HFA) 108 (90 Base) MCG/ACT inhaler Inhale 2 puffs into the lungs every 4 hours as needed for shortness of breath, wheezing or other (cough) 18 g 1    Botulinum Toxin Type A (BOTOX) 200 units injection       magnesium 250 MG tablet Take 1 tablet by mouth daily      multivitamin w/minerals (THERA-VIT-M) tablet Take 1 tablet by mouth daily      rizatriptan (MAXALT-MLT) 10 MG ODT Take 1 tablet (10 mg) by mouth at onset of headache for migraine May repeat in 2 hours. Max 3 tablets/24 hours. 18 tablet 11    methylPREDNISolone (MEDROL DOSEPAK) 4 MG tablet therapy pack Follow Package Directions (Patient not taking: Reported on 6/4/2024) 21 tablet 0    SUMAtriptan (IMITREX) 100 MG tablet Take 1 tablet (100 mg) by mouth at onset of headache for migraine (Patient not taking: Reported on 6/4/2024) 10 tablet 0     Current Facility-Administered Medications   Medication Dose Route Frequency Provider Last Rate Last Admin    Botulinum Toxin Type A (BOTOX) 200 units injection 150 Units  150 Units Intramuscular See Admin Instructions         Botulinum Toxin Type A (BOTOX) 200 units injection 150 Units  150 Units Intramuscular See Admin Instructions Lilliam Augustin MD   150 Units at 04/12/24 0929    Botulinum Toxin Type A (BOTOX) 200 units injection 150 Units  150 Units Intramuscular See Admin Instructions Hawa Montanez PA-C   150 Units at 01/12/24 1059    Botulinum Toxin Type A (BOTOX) 200 units injection 150 Units  150 Units Intramuscular See Admin Instructions Lilliam Augustin MD         Allergies   Allergen Reactions    No Known Allergies        Past medical, surgical, social and family histories were reviewed and updated in EPIC.    ROS:   12 point review of systems negative other than symptoms noted below or in the  "HPI.  No urinary frequency or dysuria, bladder or kidney problems    EXAM:  /68   Ht 1.676 m (5' 6\")   Wt 55.3 kg (122 lb)   LMP 06/03/2024   BMI 19.69 kg/m     BMI: Body mass index is 19.69 kg/m .    PHYSICAL EXAM:  Constitutional:   Appearance: Well nourished, well developed, alert, in no acute distress  Neck:  Lymph Nodes:  No lymphadenopathy present    Thyroid:  Gland size normal, nontender, no nodules or masses present  on palpation  Chest:  Respiratory Effort:  Breathing unlabored  Cardiovascular:    Heart: Auscultation:  Regular rate, normal rhythm, no murmurs present  Breasts: Inspection of Breasts:  No lymphadenopathy present., Palpation of Breasts and Axillae:  No masses present on palpation, no breast tenderness., Axillary Lymph Nodes:  No lymphadenopathy present., and No nodularity, asymmetry or nipple discharge bilaterally.  Gastrointestinal:   Abdominal Examination:  Abdomen nontender to palpation, tone normal without rigidity or guarding, no masses present, umbilicus without lesions   Liver and Spleen:  No hepatomegaly present, liver nontender to palpation    Hernias:  No hernias present  Lymphatic: Lymph Nodes:  No other lymphadenopathy present  Skin:  General Inspection:  No rashes present, no lesions present, no areas of  discoloration  Neurologic:    Mental Status:  Oriented X3.  Normal strength and tone, sensory exam                grossly normal, mentation intact and speech normal.    Psychiatric:   Mentation appears normal and affect normal/bright.         Pelvic Exam:  External Genitalia:     Normal appearance for age, no discharge present, no tenderness present, no inflammatory lesions present, color normal  Vagina:     Normal vaginal vault without central or paravaginal defects, no discharge present, no inflammatory lesions present, no masses present  Bladder:     Nontender to palpation  Urethra:   Urethral Body:  Urethra palpation normal, urethra structural support " normal   Urethral Meatus:  No erythema or lesions present  Cervix:     Appearance healthy, no lesions present, nontender to palpation, no bleeding present, pap collected  Uterus:     Uterus: firm, normal sized and nontender, anteverted in position.   Adnexa:     No adnexal tenderness present, no adnexal masses present  Perineum:     Perineum within normal limits, no evidence of trauma, no rashes or skin lesions present  Anus:     Anus within normal limits, no hemorrhoids present  Inguinal Lymph Nodes:     No lymphadenopathy present  Pubic Hair:     Normal pubic hair distribution for age  Genitalia and Groin:     No rashes present, no lesions present, no areas of discoloration, no masses present    COUNSELING:   Reviewed preventive health counseling, as reflected in patient instructions    BMI: Body mass index is 19.69 kg/m .      ASSESSMENT:  28 year old female with satisfactory annual exam.    ICD-10-CM    1. Well woman exam with routine gynecological exam  Z01.419       2. Pap smear for cervical cancer screening  Z12.4 Pap imaged thin layer screen reflex to HPV if ASCUS - recommend age 25 - 29      3. Factor 5 Leiden mutation, heterozygous (H24)  D68.51           PLAN:  Reviewed that it is normal for menses to very in length when going through stress or intense physical exercise. If menses continue to be heavy or painful we will perform a pelvic ultrasound to rule out abnormalities.     Return in 1 year for annual exam.     GUNNAR [place] CNM

## 2024-06-07 LAB
BKR LAB AP GYN ADEQUACY: NORMAL
BKR LAB AP GYN INTERPRETATION: NORMAL
BKR LAB AP HPV REFLEX: NORMAL
BKR LAB AP PREVIOUS ABNORMAL: NORMAL
PATH REPORT.COMMENTS IMP SPEC: NORMAL
PATH REPORT.COMMENTS IMP SPEC: NORMAL
PATH REPORT.RELEVANT HX SPEC: NORMAL

## 2024-06-10 DIAGNOSIS — G43.019 INTRACTABLE MIGRAINE WITHOUT AURA AND WITHOUT STATUS MIGRAINOSUS: Primary | ICD-10-CM

## 2024-06-23 SDOH — HEALTH STABILITY: PHYSICAL HEALTH: ON AVERAGE, HOW MANY DAYS PER WEEK DO YOU ENGAGE IN MODERATE TO STRENUOUS EXERCISE (LIKE A BRISK WALK)?: 4 DAYS

## 2024-06-23 SDOH — HEALTH STABILITY: PHYSICAL HEALTH: ON AVERAGE, HOW MANY MINUTES DO YOU ENGAGE IN EXERCISE AT THIS LEVEL?: 90 MIN

## 2024-06-23 ASSESSMENT — ASTHMA QUESTIONNAIRES
ACT_TOTALSCORE: 22
QUESTION_1 LAST FOUR WEEKS HOW MUCH OF THE TIME DID YOUR ASTHMA KEEP YOU FROM GETTING AS MUCH DONE AT WORK, SCHOOL OR AT HOME: A LITTLE OF THE TIME
QUESTION_4 LAST FOUR WEEKS HOW OFTEN HAVE YOU USED YOUR RESCUE INHALER OR NEBULIZER MEDICATION (SUCH AS ALBUTEROL): NOT AT ALL
QUESTION_5 LAST FOUR WEEKS HOW WOULD YOU RATE YOUR ASTHMA CONTROL: WELL CONTROLLED
ACT_TOTALSCORE: 22
QUESTION_2 LAST FOUR WEEKS HOW OFTEN HAVE YOU HAD SHORTNESS OF BREATH: ONCE OR TWICE A WEEK
QUESTION_3 LAST FOUR WEEKS HOW OFTEN DID YOUR ASTHMA SYMPTOMS (WHEEZING, COUGHING, SHORTNESS OF BREATH, CHEST TIGHTNESS OR PAIN) WAKE YOU UP AT NIGHT OR EARLIER THAN USUAL IN THE MORNING: NOT AT ALL

## 2024-06-23 ASSESSMENT — SOCIAL DETERMINANTS OF HEALTH (SDOH): HOW OFTEN DO YOU GET TOGETHER WITH FRIENDS OR RELATIVES?: ONCE A WEEK

## 2024-06-26 ENCOUNTER — OFFICE VISIT (OUTPATIENT)
Dept: FAMILY MEDICINE | Facility: CLINIC | Age: 29
End: 2024-06-26
Payer: COMMERCIAL

## 2024-06-26 VITALS
HEIGHT: 66 IN | WEIGHT: 123.5 LBS | RESPIRATION RATE: 16 BRPM | OXYGEN SATURATION: 100 % | TEMPERATURE: 98 F | SYSTOLIC BLOOD PRESSURE: 104 MMHG | HEART RATE: 75 BPM | BODY MASS INDEX: 19.85 KG/M2 | DIASTOLIC BLOOD PRESSURE: 60 MMHG

## 2024-06-26 DIAGNOSIS — J45.20 MILD INTERMITTENT ASTHMA WITHOUT COMPLICATION: ICD-10-CM

## 2024-06-26 DIAGNOSIS — G43.009 MIGRAINE WITHOUT AURA AND WITHOUT STATUS MIGRAINOSUS, NOT INTRACTABLE: ICD-10-CM

## 2024-06-26 DIAGNOSIS — Z00.00 ROUTINE GENERAL MEDICAL EXAMINATION AT A HEALTH CARE FACILITY: Primary | ICD-10-CM

## 2024-06-26 PROCEDURE — 99395 PREV VISIT EST AGE 18-39: CPT | Mod: 25

## 2024-06-26 PROCEDURE — 90677 PCV20 VACCINE IM: CPT

## 2024-06-26 PROCEDURE — 90471 IMMUNIZATION ADMIN: CPT

## 2024-06-26 PROCEDURE — 99214 OFFICE O/P EST MOD 30 MIN: CPT | Mod: 25

## 2024-06-26 RX ORDER — SUMATRIPTAN 100 MG/1
100 TABLET, FILM COATED ORAL
Qty: 10 TABLET | Refills: 0 | Status: CANCELLED | OUTPATIENT
Start: 2024-06-26

## 2024-06-26 ASSESSMENT — ENCOUNTER SYMPTOMS
DIFFICULTY URINATING: 0
SLEEP DISTURBANCE: 0
DYSPHORIC MOOD: 0
ARTHRALGIAS: 1
SHORTNESS OF BREATH: 0
BLOOD IN STOOL: 0

## 2024-06-26 NOTE — PATIENT INSTRUCTIONS
"Patient Education   Preventive Care Advice   This is general advice we often give to help people stay healthy. Your care team may have specific advice just for you. Please talk to your care team about your own preventive care needs.  Lifestyle  Exercise at least 150 minutes each week (30 minutes a day, 5 days a week).  Do muscle strengthening activities 2 days a week. These help control your weight and prevent disease.  No smoking.  Wear sunscreen to prevent skin cancer.  Have your home tested for radon every 2 to 5 years. Radon is a colorless, odorless gas that can harm your lungs. To learn more, go to www.health.[facility] and search for \"Radon in Homes.\"  Keep guns unloaded and locked up in a safe place like a safe or gun vault, or, use a gun lock and hide the keys. Always lock away bullets separately. To learn more, visit [employer].gov and search for \"safe gun storage.\"  Nutrition  Eat 5 or more servings of fruits and vegetables each day.  Try wheat bread, brown rice and whole grain pasta (instead of white bread, rice, and pasta).  Get enough calcium and vitamin D. Check the label on foods and aim for 100% of the RDA (recommended daily allowance).  Regular exams  Have a dental exam and cleaning every 6 months.  See your health care team every year to talk about:  Any changes in your health.  Any medicines your care team has prescribed.  Preventive care, family planning, and ways to prevent chronic diseases.  Shots (vaccines)   HPV shots (up to age 26), if you've never had them before.  Hepatitis B shots (up to age 59), if you've never had them before.  COVID-19 shot: Get this shot when it's due.  Flu shot: Get a flu shot every year.  Tetanus shot: Get a tetanus shot every 10 years.  Pneumococcal, hepatitis A, and RSV shots: Ask your care team if you need these based on your risk.  Shingles shot (for age 50 and up).  General health tests  Diabetes screening:  Starting at age 35, Get screened for diabetes at least " every 3 years.  If you are younger than age 35, ask your care team if you should be screened for diabetes.  Cholesterol test: At age 39, start having a cholesterol test every 5 years, or more often if advised.  Bone density scan (DEXA): At age 50, ask your care team if you should have this scan for osteoporosis (brittle bones).  Hepatitis C: Get tested at least once in your life.  Abdominal aortic aneurysm screening: Talk to your doctor about having this screening if you:  Have ever smoked; and  Are biologically male; and  Are between the ages of 65 and 75.  STIs (sexually transmitted infections)  Before age 24: Ask your care team if you should be screened for STIs.  After age 24: Get screened for STIs if you're at risk. You are at risk for STIs (including HIV) if:  You are sexually active with more than one person.  You don't use condoms every time.  You or a partner was diagnosed with a sexually transmitted infection.  If you are at risk for HIV, ask about PrEP medicine to prevent HIV.  Get tested for HIV at least once in your life, whether you are at risk for HIV or not.  Cancer screening tests  Cervical cancer screening: If you have a cervix, begin getting regular cervical cancer screening tests at age 21. Most people who have regular screenings with normal results can stop after age 65. Talk about this with your provider.  Breast cancer scan (mammogram): If you've ever had breasts, begin having regular mammograms starting at age 40. This is a scan to check for breast cancer.  Colon cancer screening: It is important to start screening for colon cancer at age 45.  Have a colonoscopy test every 10 years (or more often if you're at risk) Or, ask your provider about stool tests like a FIT test every year or Cologuard test every 3 years.  To learn more about your testing options, visit: www.[employer]/803983.pdf.  For help making a decision, visit: ryne/ak80876.  Prostate cancer screening test: If you have a  prostate and are age 55 to 69, ask your provider if you would benefit from a yearly prostate cancer screening test.  Lung cancer screening: If you are a current or former smoker age 50 to 80, ask your care team if ongoing lung cancer screenings are right for you.  For informational purposes only. Not to replace the advice of your health care provider. Copyright   2023 [facility]. All rights reserved. Clinically reviewed by the [facility] Transitions Program. JLGOV [id number] - REV 04/24.

## 2024-06-26 NOTE — PROGRESS NOTES
Preventive Care Visit  [facility]  GUNNAR Feliz CNP, Family Medicine  Jun 26, 2024      Assessment & Plan     Routine general medical examination at a health care facility  On exam, pleasant 28 year old patient. History of   Past Medical History:   Diagnosis Date    ALLERGIC RHINITIS NOS 06/19/2007    Ragweed, animal dander, grasses, trees    Factor 5 Leiden mutation, heterozygous (H24) 08/2012    POSITIVE heterozygous MUTATION    Headache(784.0)     diagnosed by neurologist    Pancreatitis 02/18/2010    Uncomplicated asthma 2012    No acute or concerning findings on today's physical exam. Vital signs at goal. Annual visit in one year. Declines COVID vaccine.   - REVIEW OF HEALTH MAINTENANCE PROTOCOL ORDERS    Migraine without aura and without status migrainosus, not intractable  Chronic. Managed with neurology. Botox injections and abortive medications. Needing refill of Sumatriptan, defer to neurology as patient is also taking Maxalt, but reports being told to use both. Will reach out to neurology for insight.     Mild intermittent asthma without complication  Managing well with PRN albuterol prior to exercise in the hot weather. Discussed recommendations for pneumonia vaccine, which she would like to get. Administered in clinic today. Continue with PRN albuterol. If worsening symptoms, follow-up visit. If coughing at night or wheezing, more urgent visit.    - Pneumococcal 20 Valent Conjugate (Prevnar 20)    Counseling  Appropriate preventive services were addressed with this patient via screening, questionnaire, or discussion as appropriate for fall prevention, nutrition, physical activity, Tobacco-use cessation, weight loss and cognition.  Checklist reviewing preventive services available has been given to the patient.    Luis Lake is a 28 year old, presenting for the following:  Physical        6/26/2024     3:13 PM   Additional Questions   Roomed by Kenzie VALADEZ MA         HPI    Doing botox for migraines. This is going well. Can feel it wearing off at this time. Lasting a little longer each time. Was told to do 2 of the Rizatriptan. Sumatriptan in case it is needed. More under controlled. Has appointment scheduled for follow-up with neurology.     No concerns for STDs.     Period was 10 days late. Factored this down to stress. This happened with the last period. Was told to keep an eye on this and may do an US if continued. Talked about the cramps and bloating with midwife.     Albuterol has not been used for over a year. When running in the humidity she will have flares. Forgets to take it before leaving for the run.     Biometric screening at work has been good.     Running a lot. Training for marathon/half. Knee pain in the left across the front, up in the inside. Managing okay.     Diet is good.     Work is good, steady. Not too stressful.    Wedding in November.         6/23/2024   General Health   How would you rate your overall physical health? Excellent   Feel stress (tense, anxious, or unable to sleep) Not at all          6/23/2024   Nutrition   Three or more servings of calcium each day? Yes   Diet: Regular (no restrictions)   How many servings of fruit and vegetables per day? (!) 0-1   How many sweetened beverages each day? 0-1          6/23/2024   Exercise   Days per week of moderate/strenous exercise 4 days   Average minutes spent exercising at this level 90 min          6/23/2024   Social Factors   Frequency of gathering with friends or relatives Once a week   Worry food won't last until get money to buy more No   Food not last or not have enough money for food? No   Do you have housing? (Housing is defined as stable permanent housing and does not include staying ouside in a car, in a tent, in an abandoned building, in an overnight shelter, or couch-surfing.) Yes   Are you worried about losing your housing? No   Lack of transportation? No   Unable to get utilities  (heat,electricity)? No            6/23/2024   Dental   Dentist two times every year? Yes            6/23/2024   TB Screening   Were you born outside of the [place]? No          Today's PHQ-2 Score:       1/8/2024    10:02 AM   PHQ-2 ( 1999 Pf[employer])   Q1: Little interest or pleasure in doing things 0   Q2: Feeling down, depressed or hopeless 0   PHQ-2 Score 0   Q1: Little interest or pleasure in doing things Not at all   Q2: Feeling down, depressed or hopeless Not at all   PHQ-2 Score 0         6/23/2024   Substance Use   Alcohol more than 3/day or more than 7/wk No   Do you use any other substances recreationally? No        Social History     Tobacco Use    Smoking status: Never     Passive exposure: Never    Smokeless tobacco: Never    Tobacco comments:     no smoking in the home   Vaping Use    Vaping status: Never Used   Substance Use Topics    Alcohol use: Yes    Drug use: No           6/22/2023   LAST FHS-7 RESULTS   1st degree relative breast or ovarian cancer Yes   Any relative bilateral breast cancer No   Any male have breast cancer No   Any ONE woman have BOTH breast AND ovarian cancer Yes   Any woman with breast cancer before 50yrs No   2 or more relatives with breast AND/OR ovarian cancer No   2 or more relatives with breast AND/OR bowel cancer No      Mammogram Screening - Patient under 40 years of age: Routine Mammogram Screening not recommended.         6/23/2024   STI Screening   New sexual partner(s) since last STI/HIV test? No      History of abnormal Pap smear: No - age 21-29 PAP every 3 years recommended        6/4/2024     9:42 AM 11/24/2021     1:53 PM 4/10/2018     3:02 PM   PAP / HPV   PAP Negative for Intraepithelial Lesion or Malignancy (NILM)  Negative for Intraepithelial Lesion or Malignancy (NILM)     PAP (Historical)   NIL          6/23/2024   Contraception/Family Planning   Questions about contraception or family planning No      Reviewed and updated as needed this visit by Provider   [place]  "    Med Hx  Surg Hx  Fam Hx  Soc Hx Sexual Activity        Review of Systems   Eyes:  Negative for visual disturbance.   Respiratory:  Negative for shortness of breath.    Cardiovascular:  Negative for chest pain.   Gastrointestinal:  Negative for blood in stool.   Genitourinary:  Positive for menstrual problem. Negative for difficulty urinating.   Musculoskeletal:  Positive for arthralgias.   Psychiatric/Behavioral:  Negative for dysphoric mood and sleep disturbance (Light sleeper).          Objective    Exam  /60 (BP Location: Right arm, Patient Position: Sitting, Cuff Size: Adult Regular)   Pulse 75   Temp 98  F (36.7  C) (Oral)   Resp 16   Ht 1.676 m (5' 6\")   Wt 56 kg (123 lb 8 oz)   LMP 06/03/2024 (Exact Date)   SpO2 100%   BMI 19.93 kg/m     Estimated body mass index is 19.93 kg/m  as calculated from the following:    Height as of this encounter: 1.676 m (5' 6\").    Weight as of this encounter: 56 kg (123 lb 8 oz).    Physical Exam  GENERAL: alert and no distress  EYES: Eyes grossly normal to inspection, PERRL and conjunctivae and sclerae normal  HENT: ear canals and TM's normal, nose and mouth without ulcers or lesions  NECK: no adenopathy, no asymmetry, masses, or scars  RESP: lungs clear to auscultation - no rales, rhonchi or wheezes  CV: regular rate and rhythm, normal S1 S2, no S3 or S4, no murmur, click or rub, no peripheral edema  ABDOMEN: soft, nontender, no hepatosplenomegaly, no masses and bowel sounds normal  MS: no gross musculoskeletal defects noted, no edema  SKIN: no suspicious lesions or rashes  NEURO: Normal strength and tone, mentation intact and speech normal  PSYCH: mentation appears normal, affect normal/bright    At the end of the visit, I confirmed understanding of what was discussed. Aleksandra has no further questions or concerns that were brought up at this time.      Hina Whitten DNP, APRN, FNP-C   "

## 2024-07-05 ASSESSMENT — HEADACHE IMPACT TEST (HIT 6)
WHEN YOU HAVE A HEADACHE HOW OFTEN DO YOU WISH YOU COULD LIE DOWN: ALWAYS
HOW OFTEN HAVE YOU FELT TOO TIRED TO WORK BECAUSE OF YOUR HEADACHES: SOMETIMES
HOW OFTEN HAVE YOU FELT FED UP OR IRRITATED BECAUSE OF YOUR HEADACHES: SOMETIMES
WHEN YOU HAVE HEADACHES HOW OFTEN IS THE PAIN SEVERE: VERY OFTEN
HOW OFTEN DID HEADACHS LIMIT CONCENTRATION ON WORK OR DAILY ACTIVITY: SOMETIMES
HIT6 TOTAL SCORE: 65
HOW OFTEN DO HEADACHES LIMIT YOUR DAILY ACTIVITIES: VERY OFTEN

## 2024-07-08 ENCOUNTER — OFFICE VISIT (OUTPATIENT)
Dept: NEUROLOGY | Facility: CLINIC | Age: 29
End: 2024-07-08
Payer: COMMERCIAL

## 2024-07-08 DIAGNOSIS — G43.E09 CHRONIC MIGRAINE WITH AURA WITHOUT STATUS MIGRAINOSUS, NOT INTRACTABLE: Primary | ICD-10-CM

## 2024-07-08 PROCEDURE — 64615 CHEMODENERV MUSC MIGRAINE: CPT | Performed by: PSYCHIATRY & NEUROLOGY

## 2024-07-08 NOTE — PROGRESS NOTES
"[facility]  Botulinum Toxin Procedure    Lilliam Augustin MD  Headache Neurology    July 8, 2024    Procedure:  OnabotulinumtoxinA injections for chronic migraine  Indication:  Chronic migraine    Ms. Lucas suffers from severe intractable headaches.  She was referred by Dr. Augustin for onabotulinumtoxinA injections for headache.  Risks, benefits, and alternatives were discussed.  All questions were answered and consent given.  She decided to proceed with the injections.      Headache history, from initial consult note: \"Headaches are retro-orbital, temporal, frontal, or sinus pressure (in the winter), neck tension. They are pressure-like, throbbing. They last all day. They rate 8/10. She has associated photophobia, phonophobia, nausea, and vomiting. She has visual aura, with blind spot, bright light lasting up to 2 hours and leading into headache.\"     Prior to initiation of botulinum toxin injections, Ms. Lucas reported 30 headache days per month, with 8-16 severe headache days per month. Her headaches are quite disabling and often interfere with her ability to function normally.     Date of last injections: 4/12/2024, third set of injections today.     Today, she reports significantly less headaches, only during the week of her menstrual period, and no severe headaches when Botox was active. Then, headaches returned as Botox wore off, about 2 weeks ago.      She has attempted other migraine prophylactic treatments in the past, which have included: propranolol, topiramate, and nortriptyline.       She currently takes no other prescription medication for headache prevention.    Procedural Pause: Procedural pause was conducted to verify correct patient identity, procedure to be performed, correct side and site, correct patient position, and special requirements. Appropriate hand hygiene was utilized, and each injection site was prepped with alcohol wipe or Chloraprep swab.     Procedure Details: 200 units of " onabotulinumtoxinA was diluted in 4 mL 0.9% normal saline. A total of 150 units of onabotulinumtoxinA were injected using 30 gauge 0.5 in needles into the muscles listed below. 50 units of onabotulinumtoxinA were wasted.     Injection Sites: Total = 150 units onabotulinumtoxinA      and Procerus muscles - 5 units into the left and right corrugators and 5 units into the procerus (15 units total)    Frontalis muscles - 5 units into the left superior frontalis and 5 units into the right superior frontalis (2 injection sites per muscle) (10 units total)    Temporalis muscles - 12.5 units into the left temporalis muscle and 12.5 units into the right temporalis muscle (2 injection sites per muscle) (25 units total)    Occipitalis muscles - 12.5 units into the left occipitalis muscle and 12.5 units into the right occipitalis muscle (2 injection sites per muscle) (25 units total)    Splenius Capitis muscles - 12.5 units into the left splenius capitis muscle and 12.5 units into the right splenius capitis muscle (2 injection sites per muscle, divided into 2/3 anteriorly and 1/3 posteriorly) (25 units total)      Trapezius muscles - 25 units into the left trapezius muscle and 25 units into the right trapezius muscle (3 injection sites per muscle, divided 5 units, 10 units, 10 units, medial to lateral) (50 units total)    Ms. Lucas tolerated the procedure well without immediate complications.  She will follow up in clinic for assessment of the effectiveness of treatment.  She did not report any change in her pain level after the botulinumtoxinA injection procedure.    Lilliam Augusitn MD  Headache Neurology  [facility]

## 2024-07-08 NOTE — LETTER
"7/8/2024       RE: Aleksandra Lucas  [address] [address]     Dear Colleague,    Thank you for referring your patient, Aleksandra Lucas, to the [facility] NEUROLOGY CLINIC [place] at [facility]. Please see a copy of my visit note below.    [facility]  Botulinum Toxin Procedure    Lilliam Augustin MD  Headache Neurology    July 8, 2024    Procedure:  OnabotulinumtoxinA injections for chronic migraine  Indication:  Chronic migraine    Ms. Lucas suffers from severe intractable headaches.  She was referred by Dr. Augustin for onabotulinumtoxinA injections for headache.  Risks, benefits, and alternatives were discussed.  All questions were answered and consent given.  She decided to proceed with the injections.      Headache history, from initial consult note: \"Headaches are retro-orbital, temporal, frontal, or sinus pressure (in the winter), neck tension. They are pressure-like, throbbing. They last all day. They rate 8/10. She has associated photophobia, phonophobia, nausea, and vomiting. She has visual aura, with blind spot, bright light lasting up to 2 hours and leading into headache.\"     Prior to initiation of botulinum toxin injections, Ms. Lucas reported 30 headache days per month, with 8-16 severe headache days per month. Her headaches are quite disabling and often interfere with her ability to function normally.     Date of last injections: 4/12/2024, third set of injections today.     Today, she reports significantly less headaches, only during the week of her menstrual period, and no severe headaches when Botox was active. Then, headaches returned as Botox wore off, about 2 weeks ago.      She has attempted other migraine prophylactic treatments in the past, which have included: propranolol, topiramate, and nortriptyline.       She currently takes no other prescription medication for headache prevention.    Procedural Pause: " Procedural pause was conducted to verify correct patient identity, procedure to be performed, correct side and site, correct patient position, and special requirements. Appropriate hand hygiene was utilized, and each injection site was prepped with alcohol wipe or Chloraprep swab.     Procedure Details: 200 units of onabotulinumtoxinA was diluted in 4 mL 0.9% normal saline. A total of 150 units of onabotulinumtoxinA were injected using 30 gauge 0.5 in needles into the muscles listed below. 50 units of onabotulinumtoxinA were wasted.     Injection Sites: Total = 150 units onabotulinumtoxinA      and Procerus muscles - 5 units into the left and right corrugators and 5 units into the procerus (15 units total)    Frontalis muscles - 5 units into the left superior frontalis and 5 units into the right superior frontalis (2 injection sites per muscle) (10 units total)    Temporalis muscles - 12.5 units into the left temporalis muscle and 12.5 units into the right temporalis muscle (2 injection sites per muscle) (25 units total)    Occipitalis muscles - 12.5 units into the left occipitalis muscle and 12.5 units into the right occipitalis muscle (2 injection sites per muscle) (25 units total)    Splenius Capitis muscles - 12.5 units into the left splenius capitis muscle and 12.5 units into the right splenius capitis muscle (2 injection sites per muscle, divided into 2/3 anteriorly and 1/3 posteriorly) (25 units total)      Trapezius muscles - 25 units into the left trapezius muscle and 25 units into the right trapezius muscle (3 injection sites per muscle, divided 5 units, 10 units, 10 units, medial to lateral) (50 units total)    Ms. Lucas tolerated the procedure well without immediate complications.  She will follow up in clinic for assessment of the effectiveness of treatment.  She did not report any change in her pain level after the botulinumtoxinA injection procedure.    Lilliam Augustin MD  Headache  Neurology  [facility]      Again, thank you for allowing me to participate in the care of your patient.      Sincerely,    Lilliam Augustin MD

## 2024-07-19 DIAGNOSIS — G43.E09 CHRONIC MIGRAINE WITH AURA WITHOUT STATUS MIGRAINOSUS, NOT INTRACTABLE: Primary | ICD-10-CM

## 2024-07-19 RX ORDER — PROCHLORPERAZINE MALEATE 10 MG
10 TABLET ORAL EVERY 6 HOURS PRN
Qty: 20 TABLET | Refills: 11 | Status: SHIPPED | OUTPATIENT
Start: 2024-07-19 | End: 2024-09-06

## 2024-08-14 ENCOUNTER — TELEPHONE (OUTPATIENT)
Dept: NEUROLOGY | Facility: CLINIC | Age: 29
End: 2024-08-14
Payer: COMMERCIAL

## 2024-08-14 NOTE — TELEPHONE ENCOUNTER
Left Voicemail (1st Attempt) and Sent Mychart (1st Attempt) for the patient to call back and schedule the following:    Appointment type: Resched Dec Appt  Provider: Dr. Augustin  Return date: Early Jeffrey  Specialty phone number: [phone]  Additional appointment(s) needed:   Additonal Notes:     CHEST PAIN/SYNCOPE/DIZZINESS/SHORTNESS OF BREATH

## 2024-09-05 ASSESSMENT — MIGRAINE DISABILITY ASSESSMENT (MIDAS)
ON A SCALE FROM 0-10 ON AVERAGE HOW PAINFUL WERE HEADACHES: 6
TOTAL SCORE: 10
HOW MANY DAYS WAS HOUSEWORK PRODUCTIVITY CUT IN HALF DUE TO HEADACHES: 0
HOW OFTEN WERE SOCIAL ACTIVITIES MISSED DUE TO HEADACHES: 2
HOW MANY DAYS WAS YOUR PRODUCTIVITY CUT IN HALF BECAUSE OF HEADACHES: 0
HOW MANY DAYS DID YOU MISS WORK OR SCHOOL BECAUSE OF HEADACHES: 5
HOW MANY DAYS DID YOU NOT DO HOUSEWORK BECAUSE OF HEADACHES: 3
HOW MANY DAYS IN THE PAST 3 MONTHS HAVE YOU HAD A HEADACHE: 15

## 2024-09-05 ASSESSMENT — HEADACHE IMPACT TEST (HIT 6)
WHEN YOU HAVE A HEADACHE HOW OFTEN DO YOU WISH YOU COULD LIE DOWN: SOMETIMES
HOW OFTEN HAVE YOU FELT TOO TIRED TO WORK BECAUSE OF YOUR HEADACHES: SOMETIMES
WHEN YOU HAVE HEADACHES HOW OFTEN IS THE PAIN SEVERE: SOMETIMES
HIT6 TOTAL SCORE: 60
HOW OFTEN HAVE YOU FELT FED UP OR IRRITATED BECAUSE OF YOUR HEADACHES: SOMETIMES
HOW OFTEN DO HEADACHES LIMIT YOUR DAILY ACTIVITIES: SOMETIMES
HOW OFTEN DID HEADACHS LIMIT CONCENTRATION ON WORK OR DAILY ACTIVITY: SOMETIMES

## 2024-09-06 ENCOUNTER — OFFICE VISIT (OUTPATIENT)
Dept: NEUROLOGY | Facility: CLINIC | Age: 29
End: 2024-09-06
Payer: COMMERCIAL

## 2024-09-06 VITALS
HEIGHT: 66 IN | DIASTOLIC BLOOD PRESSURE: 72 MMHG | HEART RATE: 80 BPM | WEIGHT: 123 LBS | OXYGEN SATURATION: 100 % | BODY MASS INDEX: 19.77 KG/M2 | SYSTOLIC BLOOD PRESSURE: 108 MMHG

## 2024-09-06 DIAGNOSIS — G43.E09 CHRONIC MIGRAINE WITH AURA WITHOUT STATUS MIGRAINOSUS, NOT INTRACTABLE: ICD-10-CM

## 2024-09-06 PROCEDURE — 99213 OFFICE O/P EST LOW 20 MIN: CPT | Performed by: PSYCHIATRY & NEUROLOGY

## 2024-09-06 PROCEDURE — G2211 COMPLEX E/M VISIT ADD ON: HCPCS | Performed by: PSYCHIATRY & NEUROLOGY

## 2024-09-06 RX ORDER — PROCHLORPERAZINE MALEATE 10 MG
10 TABLET ORAL EVERY 6 HOURS PRN
Qty: 20 TABLET | Refills: 11 | Status: SHIPPED | OUTPATIENT
Start: 2024-09-06

## 2024-09-06 RX ORDER — RIZATRIPTAN BENZOATE 10 MG/1
10 TABLET, ORALLY DISINTEGRATING ORAL
Qty: 18 TABLET | Refills: 11 | Status: SHIPPED | OUTPATIENT
Start: 2024-09-06

## 2024-09-06 NOTE — NURSING NOTE
"Aleksandra Lucas is a 28 year old female who presents for:  Chief Complaint   Patient presents with    Headache     9 month         Initial Vitals:  Pulse 80   Ht 1.676 m (5' 6\")   Wt 55.8 kg (123 lb)   SpO2 100%   BMI 19.85 kg/m   Estimated body mass index is 19.85 kg/m  as calculated from the following:    Height as of this encounter: 1.676 m (5' 6\").    Weight as of this encounter: 55.8 kg (123 lb).. Body surface area is 1.61 meters squared. BP completed using cuff size: joe Okeefe CMA    "

## 2024-09-06 NOTE — LETTER
9/6/2024      Aleksandra Lucas  [address] [address]      Dear Colleague,    Thank you for referring your patient, Aleksandra Lucas, to the [facility] NEUROLOGY CLINICS [facility]. Please see a copy of my visit note below.    [facility]    Headache Neurology Progress Note  September 6, 2024      Assessment/Plan:   Aleksandra Lucas is a 28 year old woman who returns for follow up of chronic migraine.  I suspect she has this on a genetic basis, as there is a strong family history on her mother side.  Her presentation is complicated by planning for pregnancy in the next 1 to 2 years. Botox is working well for her; significantly less headache and migraine attacks. Rizatriptan and prochlorperazine are helpful as needed.     I recommend a symptomatic headache plan, with acute and preventative therapies.  -For acute treatment of mild headache, she will continue over the counter medication as needed, not to exceed more than 14 days per month to avoid medication overuse.  -For acute treatment of moderate or severe headache, I recommend a trial of rizatriptan 10 mg ODT at the onset of headache, with a repeat dose in 2 hours if needed. This should not exceed more than 9 days per month to avoid medication overuse.  -For headache related nausea or as a rescue medication for headache, I recommend prochlorperazine 10 mg with or without diphenhydramine 25 mg.     For headache prevention, we discussed the following:  -Magnesium 400-800 mg daily and riboflavin 400 mg daily  -I recommend continuing Botox using a chronic migraine protocol every 12 weeks.    The longitudinal plan of care for Aleksandra was addressed during this visit. Due to the added complexity in care, I will continue to support Aleksandra in the subsequent management of this condition(s) and with the ongoing continuity of care of this condition(s). I will see her back in 1 year.    Lilliam Augustin MD  Neurology  "    Subjective:    Aleksandra Lucas returns for follow up of chronic migraine.     Headache history, from initial consult note: \"Headaches are retro-orbital, temporal, frontal, or sinus pressure (in the winter), neck tension. They are pressure-like, throbbing. They last all day. They rate 8/10. She has associated photophobia, phonophobia, nausea, and vomiting. She has visual aura, with blind spot, bright light lasting up to 2 hours and leading into headache.\"     Prior to initiation of botulinum toxin injections, Ms. Lucas reported 30 headache days per month, with 8-16 severe headache days per month. Her headaches are quite disabling and often interfere with her ability to function normally.     Today, she reports headaches only during the week of her menstrual period, and no severe headaches when Botox was active. Then, headaches returned as Botox wore off, about 2 weeks prior to the next dose.     Rizatriptan is helpful as needed. She sometimes needs a second dose.   Prochlorperazine is helpful as needed for nausea/rescue.     She has attempted other migraine prophylactic treatments in the past, which have included: propranolol, topiramate, and nortriptyline.       Medrol dose was somewhat helpful for headaches when Botox was kicking in.     In the ER in 2017, she received: 1 L IV normal saline, IV Benadryl, IV Compazine, IV Toradol, and IV magnesium.     Objective:    Vitals: /72   Pulse 80   Ht 1.676 m (5' 6\")   Wt 55.8 kg (123 lb)   SpO2 100%   BMI 19.85 kg/m    General: Cooperative, NAD  Neurologic:  Mental Status: Fully alert, attentive and oriented. Speech clear and fluent.   Cranial Nerves: Facial movements symmetric.   Motor: No abnormal movements.      Pertinent Investigations:          12/6/2023     8:11 PM 7/5/2024    10:07 AM 9/5/2024    12:02 PM   HIT-6   When you have headaches, how often is the pain severe 13 11 10   How often do headaches limit your ability to do usual daily activities " including household work, work, school, or social activities? 13 11 10   When you have a headache, how often do you wish you could lie down? 11 13 10   In the past 4 weeks, how often have you felt too tired to do work or daily activities because of your headaches 13 10 10   In the past 4 weeks, how often have you felt fed up or irritated because of your headaches 13 10 10   In the past 4 weeks, how often did headaches limit your ability to concentrate on work or daily activities 11 10 10   HIT-6 Total Score 74 65 60           12/14/2023    10:22 PM 9/5/2024    12:07 PM   MIDAS - in the past three months:   On how many days did you miss work or school because of your headaches? 1 5   How many days was your productivity at work or school reduced by half or more because of your headaches? 8 0   On how many days did you not do household work because of your headaches? 5 3   How many days was your productivity in household work reduced by half or more because of your headaches? 15 0   On how many days did you miss family, social, or leisure activities because of your headaches? 2 2   On how many days did you have a headache? 28 15   On a scale of 0-10, on average how painful were these headaches? 6 6   MIDAS Score 31 (IV - Severe Disability) 10 (II - Mild Disability)          Again, thank you for allowing me to participate in the care of your patient.        Sincerely,        Lilliam Augustin MD

## 2024-09-06 NOTE — PROGRESS NOTES
"[facility]    Headache Neurology Progress Note  September 6, 2024      Assessment/Plan:   Aleksandra Lucas is a 28 year old woman who returns for follow up of chronic migraine.  I suspect she has this on a genetic basis, as there is a strong family history on her mother side.  Her presentation is complicated by planning for pregnancy in the next 1 to 2 years. Botox is working well for her; significantly less headache and migraine attacks. Rizatriptan and prochlorperazine are helpful as needed.     I recommend a symptomatic headache plan, with acute and preventative therapies.  -For acute treatment of mild headache, she will continue over the counter medication as needed, not to exceed more than 14 days per month to avoid medication overuse.  -For acute treatment of moderate or severe headache, I recommend a trial of rizatriptan 10 mg ODT at the onset of headache, with a repeat dose in 2 hours if needed. This should not exceed more than 9 days per month to avoid medication overuse.  -For headache related nausea or as a rescue medication for headache, I recommend prochlorperazine 10 mg with or without diphenhydramine 25 mg.     For headache prevention, we discussed the following:  -Magnesium 400-800 mg daily and riboflavin 400 mg daily  -I recommend continuing Botox using a chronic migraine protocol every 12 weeks.    The longitudinal plan of care for Aleksandra was addressed during this visit. Due to the added complexity in care, I will continue to support Aleksandra in the subsequent management of this condition(s) and with the ongoing continuity of care of this condition(s). I will see her back in 1 year.    Lilliam Augustin MD  Neurology     Subjective:    Aleksandra Lucas returns for follow up of chronic migraine.     Headache history, from initial consult note: \"Headaches are retro-orbital, temporal, frontal, or sinus pressure (in the winter), neck tension. They are pressure-like, throbbing. " "They last all day. They rate 8/10. She has associated photophobia, phonophobia, nausea, and vomiting. She has visual aura, with blind spot, bright light lasting up to 2 hours and leading into headache.\"     Prior to initiation of botulinum toxin injections, Ms. Lucas reported 30 headache days per month, with 8-16 severe headache days per month. Her headaches are quite disabling and often interfere with her ability to function normally.     Today, she reports headaches only during the week of her menstrual period, and no severe headaches when Botox was active. Then, headaches returned as Botox wore off, about 2 weeks prior to the next dose.     Rizatriptan is helpful as needed. She sometimes needs a second dose.   Prochlorperazine is helpful as needed for nausea/rescue.     She has attempted other migraine prophylactic treatments in the past, which have included: propranolol, topiramate, and nortriptyline.       Medrol dose was somewhat helpful for headaches when Botox was kicking in.     In the ER in 2017, she received: 1 L IV normal saline, IV Benadryl, IV Compazine, IV Toradol, and IV magnesium.     Objective:    Vitals: /72   Pulse 80   Ht 1.676 m (5' 6\")   Wt 55.8 kg (123 lb)   SpO2 100%   BMI 19.85 kg/m    General: Cooperative, NAD  Neurologic:  Mental Status: Fully alert, attentive and oriented. Speech clear and fluent.   Cranial Nerves: Facial movements symmetric.   Motor: No abnormal movements.      Pertinent Investigations:          12/6/2023     8:11 PM 7/5/2024    10:07 AM 9/5/2024    12:02 PM   HIT-6   When you have headaches, how often is the pain severe 13 11 10   How often do headaches limit your ability to do usual daily activities including household work, work, school, or social activities? 13 11 10   When you have a headache, how often do you wish you could lie down? 11 13 10   In the past 4 weeks, how often have you felt too tired to do work or daily activities because of your " headaches 13 10 10   In the past 4 weeks, how often have you felt fed up or irritated because of your headaches 13 10 10   In the past 4 weeks, how often did headaches limit your ability to concentrate on work or daily activities 11 10 10   HIT-6 Total Score 74 65 60           12/14/2023    10:22 PM 9/5/2024    12:07 PM   MIDAS - in the past three months:   On how many days did you miss work or school because of your headaches? 1 5   How many days was your productivity at work or school reduced by half or more because of your headaches? 8 0   On how many days did you not do household work because of your headaches? 5 3   How many days was your productivity in household work reduced by half or more because of your headaches? 15 0   On how many days did you miss family, social, or leisure activities because of your headaches? 2 2   On how many days did you have a headache? 28 15   On a scale of 0-10, on average how painful were these headaches? 6 6   MIDAS Score 31 (IV - Severe Disability) 10 (II - Mild Disability)

## 2024-09-16 ENCOUNTER — OFFICE VISIT (OUTPATIENT)
Dept: PEDIATRICS | Facility: CLINIC | Age: 29
End: 2024-09-16
Payer: COMMERCIAL

## 2024-09-16 ENCOUNTER — HOSPITAL ENCOUNTER (OUTPATIENT)
Dept: GENERAL RADIOLOGY | Facility: HOSPITAL | Age: 29
Discharge: HOME | End: 2024-09-16
Attending: FAMILY MEDICINE
Payer: COMMERCIAL

## 2024-09-16 ENCOUNTER — HOSPITAL ENCOUNTER (OUTPATIENT)
Dept: ULTRASOUND IMAGING | Facility: HOSPITAL | Age: 29
Discharge: HOME | End: 2024-09-16
Attending: FAMILY MEDICINE
Payer: COMMERCIAL

## 2024-09-16 ENCOUNTER — HOSPITAL ENCOUNTER (OUTPATIENT)
Dept: CT IMAGING | Facility: HOSPITAL | Age: 29
Discharge: HOME | End: 2024-09-16
Attending: FAMILY MEDICINE
Payer: COMMERCIAL

## 2024-09-16 VITALS
RESPIRATION RATE: 18 BRPM | TEMPERATURE: 98.7 F | BODY MASS INDEX: 19.37 KG/M2 | WEIGHT: 120.5 LBS | OXYGEN SATURATION: 100 % | SYSTOLIC BLOOD PRESSURE: 114 MMHG | HEIGHT: 66 IN | DIASTOLIC BLOOD PRESSURE: 75 MMHG | HEART RATE: 84 BPM

## 2024-09-16 DIAGNOSIS — R06.09 DYSPNEA ON EXERTION: ICD-10-CM

## 2024-09-16 DIAGNOSIS — R79.89 ELEVATED D-DIMER: ICD-10-CM

## 2024-09-16 DIAGNOSIS — D68.51 FACTOR 5 LEIDEN MUTATION, HETEROZYGOUS (H): ICD-10-CM

## 2024-09-16 DIAGNOSIS — R10.11 RUQ ABDOMINAL PAIN: ICD-10-CM

## 2024-09-16 DIAGNOSIS — R07.81 RIB PAIN ON RIGHT SIDE: Primary | ICD-10-CM

## 2024-09-16 DIAGNOSIS — R07.81 RIB PAIN ON RIGHT SIDE: ICD-10-CM

## 2024-09-16 LAB
ALBUMIN SERPL-MCNC: 4 G/DL (ref 3.4–5)
ALBUMIN UR-MCNC: NEGATIVE MG/DL
ALP SERPL-CCNC: 69 U/L (ref 40–150)
ALT SERPL W P-5'-P-CCNC: 19 U/L (ref 0–50)
ANION GAP SERPL CALCULATED.3IONS-SCNC: 6 MMOL/L (ref 3–14)
APPEARANCE UR: CLEAR
AST SERPL W P-5'-P-CCNC: 29 U/L (ref 0–45)
BACTERIA #/AREA URNS HPF: ABNORMAL /HPF
BASOPHILS # BLD AUTO: 0 10E3/UL (ref 0–0.2)
BASOPHILS NFR BLD AUTO: 0 %
BILIRUB SERPL-MCNC: 0.7 MG/DL (ref 0.2–1.3)
BILIRUB UR QL STRIP: NEGATIVE
BUN SERPL-MCNC: 10 MG/DL (ref 7–30)
CALCIUM SERPL-MCNC: 9.1 MG/DL (ref 8.5–10.1)
CHLORIDE BLD-SCNC: 104 MMOL/L (ref 94–109)
CO2 SERPL-SCNC: 28 MMOL/L (ref 20–32)
COLOR UR AUTO: YELLOW
CREAT SERPL-MCNC: 0.5 MG/DL (ref 0.52–1.04)
D DIMER PPP FEU-MCNC: 0.6 UG/ML FEU (ref 0–0.5)
EGFRCR SERPLBLD CKD-EPI 2021: >90 ML/MIN/1.73M2
EOSINOPHIL # BLD AUTO: 0 10E3/UL (ref 0–0.7)
EOSINOPHIL NFR BLD AUTO: 1 %
ERYTHROCYTE [DISTWIDTH] IN BLOOD BY AUTOMATED COUNT: 12.1 % (ref 10–15)
GLUCOSE BLD-MCNC: 90 MG/DL (ref 70–99)
GLUCOSE UR STRIP-MCNC: NEGATIVE MG/DL
HCG UR QL: NEGATIVE
HCT VFR BLD AUTO: 39.9 % (ref 35–47)
HGB BLD-MCNC: 14 G/DL (ref 11.7–15.7)
HGB UR QL STRIP: ABNORMAL
IMM GRANULOCYTES # BLD: 0 10E3/UL
IMM GRANULOCYTES NFR BLD: 0 %
KETONES UR STRIP-MCNC: NEGATIVE MG/DL
LEUKOCYTE ESTERASE UR QL STRIP: NEGATIVE
LIPASE SERPL-CCNC: 31 U/L (ref 13–60)
LYMPHOCYTES # BLD AUTO: 0.8 10E3/UL (ref 0.8–5.3)
LYMPHOCYTES NFR BLD AUTO: 23 %
MCH RBC QN AUTO: 32.2 PG (ref 26.5–33)
MCHC RBC AUTO-ENTMCNC: 35.1 G/DL (ref 31.5–36.5)
MCV RBC AUTO: 92 FL (ref 78–100)
MONOCYTES # BLD AUTO: 0.3 10E3/UL (ref 0–1.3)
MONOCYTES NFR BLD AUTO: 9 %
NEUTROPHILS # BLD AUTO: 2.2 10E3/UL (ref 1.6–8.3)
NEUTROPHILS NFR BLD AUTO: 67 %
NITRATE UR QL: NEGATIVE
PH UR STRIP: 6 [PH] (ref 5–8)
PLATELET # BLD AUTO: 179 10E3/UL (ref 150–450)
POTASSIUM BLD-SCNC: 3.7 MMOL/L (ref 3.4–5.3)
PROT SERPL-MCNC: 7.5 G/DL (ref 6.8–8.8)
RBC # BLD AUTO: 4.35 10E6/UL (ref 3.8–5.2)
RBC #/AREA URNS AUTO: ABNORMAL /HPF
SODIUM SERPL-SCNC: 138 MMOL/L (ref 135–145)
SP GR UR STRIP: 1.02 (ref 1–1.03)
SQUAMOUS #/AREA URNS AUTO: ABNORMAL /LPF
UROBILINOGEN UR STRIP-ACNC: 0.2 E.U./DL
WBC # BLD AUTO: 3.3 10E3/UL (ref 4–11)
WBC #/AREA URNS AUTO: ABNORMAL /HPF

## 2024-09-16 PROCEDURE — 99215 OFFICE O/P EST HI 40 MIN: CPT | Performed by: FAMILY MEDICINE

## 2024-09-16 PROCEDURE — 81001 URINALYSIS AUTO W/SCOPE: CPT | Performed by: FAMILY MEDICINE

## 2024-09-16 PROCEDURE — 250N000009 HC RX 250: Performed by: FAMILY MEDICINE

## 2024-09-16 PROCEDURE — 85379 FIBRIN DEGRADATION QUANT: CPT | Performed by: FAMILY MEDICINE

## 2024-09-16 PROCEDURE — 83690 ASSAY OF LIPASE: CPT | Performed by: FAMILY MEDICINE

## 2024-09-16 PROCEDURE — 85025 COMPLETE CBC W/AUTO DIFF WBC: CPT | Performed by: FAMILY MEDICINE

## 2024-09-16 PROCEDURE — 71046 X-RAY EXAM CHEST 2 VIEWS: CPT

## 2024-09-16 PROCEDURE — 81025 URINE PREGNANCY TEST: CPT | Performed by: FAMILY MEDICINE

## 2024-09-16 PROCEDURE — 76705 ECHO EXAM OF ABDOMEN: CPT

## 2024-09-16 PROCEDURE — 80053 COMPREHEN METABOLIC PANEL: CPT | Performed by: FAMILY MEDICINE

## 2024-09-16 PROCEDURE — 250N000011 HC RX IP 250 OP 636: Performed by: FAMILY MEDICINE

## 2024-09-16 PROCEDURE — 71275 CT ANGIOGRAPHY CHEST: CPT

## 2024-09-16 PROCEDURE — 36415 COLL VENOUS BLD VENIPUNCTURE: CPT | Performed by: FAMILY MEDICINE

## 2024-09-16 RX ORDER — IOPAMIDOL 755 MG/ML
47 INJECTION, SOLUTION INTRAVASCULAR ONCE
Status: COMPLETED | OUTPATIENT
Start: 2024-09-16 | End: 2024-09-16

## 2024-09-16 RX ADMIN — SODIUM CHLORIDE 93 ML: 9 INJECTION, SOLUTION INTRAVENOUS at 17:00

## 2024-09-16 RX ADMIN — IOPAMIDOL 47 ML: 755 INJECTION, SOLUTION INTRAVENOUS at 16:56

## 2024-09-16 ASSESSMENT — PAIN SCALES - GENERAL: PAINLEVEL: NO PAIN (0)

## 2024-09-16 NOTE — PROGRESS NOTES
[facility] Clinic Visit    Assessment & Plan     Dyspnea on exertion  D-dimer positive in a patient with new pain and dyspnea on exertion with factor V Leiden mutation.  CT chest is negative for PE.  Recommended:  Watch her symptoms closely and follow-up if you are not seeing improvement.  Avoid activities that cause pain.  Patient verbalized understanding  Note done for work  - D dimer quantitative; Future  - XR Chest 2 Views; Future  - D dimer quantitative  - CT Chest Pulmonary Embolism w Contrast; Future    Rib pain on right side  Chest x-ray clear  - D dimer quantitative; Future  - XR Chest 2 Views; Future  - D dimer quantitative  - CT Chest Pulmonary Embolism w Contrast; Future    Factor 5 Leiden mutation, heterozygous (H24)  See above  - D dimer quantitative; Future  - D dimer quantitative  - CT Chest Pulmonary Embolism w Contrast; Future    RUQ abdominal pain  Lab workup and right upper quadrant ultrasound are negative  See note for full details  - CBC with platelets differential; Future  - Comprehensive metabolic panel; Future  - Lipase; Future  - UA with Microscopic reflex to Culture; Future  - HCG qualitative urine; Future  - US Abdomen Limited; Future  - HCG qualitative urine  - UA with Microscopic reflex to Culture  - CBC with platelets differential  - Comprehensive metabolic panel  - Lipase  - UA Microscopic with Reflex to Culture  - CT Chest Pulmonary Embolism w Contrast; Future    Elevated d-dimer  Chest CT done and negative for PE  - sodium chloride (PF) 0.9% PF flush 3 mL  - CT Chest Pulmonary Embolism w Contrast; Future    Over 45 minutes were spent doing chart review, history and exam, documentation and further activities per the note.             No follow-ups on file.    Subjective   Aleksandra is a 28 year old, presenting for the following health issues:  Breathing Problem (Pain on inspiration and laying on side. )    HPI     Shortness of Breath/Breathing Problem:  Starts at the  middle of right rib cage and radiates to bottom of rib cage. Noticed it while laying on side but now it's all the time.   Onset/Duration: Midnight last night.   Progression of symptoms: same  Accompanying signs and symptoms:       SOB at rest: YES       SOB with activity: YES       Pain with inspiration: YES       Cough: No       Pink tinged sputum: No       Sweating: No       Nausea/vomiting: No       Lightheadedness: YES       Palpitations: No       Fever/chills: YES- chills       Heartburn: No  History   Family history of coagulation disorders: YES mom and maternal grandma have factor 5- pt does too.    Tobacco use: No  Previous similar symptoms: no   Precipitating factors:  Related to eating: No  Better with burping: No  Therapies tried and outcome: N/A    Patient is a 28-year-old female with known factor V Leiden deficiency who woke up at midnight last night with pressure in her right lower lung area and difficulty breathing.  The pain persisted through position changes and has persisted throughout the day today.  She feels pain at the right anterior rib cage which is worse with deep breaths.  In hindsight she did feel more out of breath yesterday on her typical run and also when she went to a game and she was going up the stadium stairs.  She denies gas pain or bloating.  No nausea, vomiting, diarrhea or constipation.  She had a normal bowel movement today.  No personal history of blood clot  She did eat dinner last night around 8 PM and had orange chicken and dumplings.  Pain started at midnight.  Today she has had a decreased appetite and has not eaten much.  She had breakfast and a little bit of lunch.  As a child around age 9 or 11 she was hospitalized for observation for something to do with her gallbladder.  Patient called her mom and it was actually an elevated lipase that was monitored.    Review of Systems  Negative except as listed in HPI      Objective    /75 (BP Location: Right arm, Patient  "Position: Sitting, Cuff Size: Adult Regular)   Pulse 84   Temp 98.7  F (37.1  C) (Oral)   Resp 18   Ht 1.676 m (5' 6\")   Wt 54.7 kg (120 lb 8 oz)   SpO2 100%   BMI 19.45 kg/m    Body mass index is 19.45 kg/m .  Physical Exam   Vitals are noted and within normal limits  In general she is alert, oriented, and in no acute distress  Heart regular rate and rhythm with no murmurs  Lungs clear to auscultation bilaterally with good air entry  Back with no CVA tenderness  Back lower chest and abdomen with no rash  Tenderness to palpation of the right anterior lower ribs  Abdomen with normal bowel sounds, soft and nondistended.  There is tenderness to palpation of the right upper quadrant  Chest x-ray is within normal limits  Right upper quadrant ultrasound is negative  D-dimer: Elevated to 0.6  UPT negative  UA negative for infection.    CBC mildly low total white count, otherwise within normal limits  CMP is within normal limits  Lipase: Normal at 31  CT chest PE protocol: Negative for PE  Results for orders placed or performed during the hospital encounter of 09/16/24   CT Chest Pulmonary Embolism w Contrast     Status: None    Narrative    EXAM: CT CHEST PULMONARY EMBOLISM W CONTRAST  LOCATION: [facility]  DATE: 9/16/2024    INDICATION:  Rib pain on right side, Factor 5 Leiden mutation, Dyspnea on exertion, RUQ abdominal pain, Elevated d-dimer  COMPARISON: None.  TECHNIQUE: CT chest pulmonary angiogram during arterial phase injection of IV contrast. Multiplanar reformats and MIP reconstructions were performed. Dose reduction techniques were used.   CONTRAST: 47 ml Isovue 370    FINDINGS:  ANGIOGRAM CHEST: Pulmonary arteries are normal caliber and negative for pulmonary emboli. Thoracic aorta is negative for dissection. No CT evidence of right heart strain.    LUNGS AND PLEURA: Central airways are patent. No areas of consolidation. No pleural effusion or pneumothorax.    MEDIASTINUM/AXILLAE: " Normal heart size. No pericardial effusion. No thoracic lymphadenopathy.    CORONARY ARTERY CALCIFICATION: None.    UPPER ABDOMEN: No acute findings.    MUSCULOSKELETAL: No acute osseous abnormality.      Impression    IMPRESSION:  No pulmonary embolism or other acute cardiopulmonary abnormality.   Results for orders placed or performed during the hospital encounter of 09/16/24   XR Chest 2 Views     Status: None    Narrative    EXAM: XR CHEST 2 VIEWS  LOCATION: [facility]  DATE: 9/16/2024    INDICATION:  Rib pain on right side, Dyspnea on exertion  COMPARISON: None.      Impression    IMPRESSION:     Lungs are clear. No evidence of pneumonia. No pleural effusions or pneumothorax. Normal pulmonary vascularity. Nonenlarged cardiac silhouette.    No displaced rib fractures are visualized.   Results for orders placed or performed during the hospital encounter of 09/16/24   US Abdomen Limited     Status: None    Narrative    EXAM: US ABDOMEN LIMITED  LOCATION: [facility]  DATE: 9/16/2024    INDICATION: RUQ pain since midnight.  COMPARISON: None.  TECHNIQUE: Limited abdominal ultrasound.    FINDINGS:    GALLBLADDER: No gallstones, wall thickening, or pericholecystic fluid. Negative sonographic Yeung's sign.    BILE DUCTS: No biliary dilatation. The common duct measures 2 mm.    LIVER: Normal parenchyma with smooth contour. No focal mass.    RIGHT KIDNEY: No hydronephrosis.    PANCREAS: The visualized portions are normal.    No ascites.      Impression    IMPRESSION:  1.  Normal limited abdominal ultrasound.       Results for orders placed or performed in visit on 09/16/24   HCG qualitative urine     Status: Normal   Result Value Ref Range    hCG Urine Qualitative Negative Negative   UA with Microscopic reflex to Culture     Status: Abnormal    Specimen: Urine, Clean Catch   Result Value Ref Range    Color Urine Yellow Colorless, Straw, Light Yellow, Yellow    Appearance  Urine Clear Clear    Glucose Urine Negative Negative mg/dL    Bilirubin Urine Negative Negative    Ketones Urine Negative Negative mg/dL    Specific Gravity Urine 1.020 1.005 - 1.030    Blood Urine Trace (A) Negative    pH Urine 6.0 5.0 - 8.0    Protein Albumin Urine Negative Negative mg/dL    Urobilinogen Urine 0.2 0.2, 1.0 E.U./dL    Nitrite Urine Negative Negative    Leukocyte Esterase Urine Negative Negative   Comprehensive metabolic panel     Status: Abnormal   Result Value Ref Range    Sodium 138 135 - 145 mmol/L    Potassium 3.7 3.4 - 5.3 mmol/L    Chloride 104 94 - 109 mmol/L    Carbon Dioxide (CO2) 28 20 - 32 mmol/L    Anion Gap 6 3 - 14 mmol/L    Urea Nitrogen 10 7 - 30 mg/dL    Creatinine 0.50 (L) 0.52 - 1.04 mg/dL    GFR Estimate >90 >60 mL/min/1.73m2    Calcium 9.1 8.5 - 10.1 mg/dL    Glucose 90 70 - 99 mg/dL    Alkaline Phosphatase 69 40 - 150 U/L    AST 29 0 - 45 U/L    ALT 19 0 - 50 U/L    Protein Total 7.5 6.8 - 8.8 g/dL    Albumin 4.0 3.4 - 5.0 g/dL    Bilirubin Total 0.7 0.2 - 1.3 mg/dL   D dimer quantitative     Status: Abnormal   Result Value Ref Range    D-Dimer Quantitative 0.60 (H) 0.00 - 0.50 ug/mL FEU    Narrative    This D-dimer assay is intended for use in conjunction with a clinical pretest probability assessment model to exclude pulmonary embolism (PE) and deep venous thrombosis (DVT) in outpatients suspected of PE or DVT. The cut-off value is 0.50 ug/mL FEU.   Lipase     Status: Normal   Result Value Ref Range    Lipase 31 13 - 60 U/L   CBC with platelets and differential     Status: Abnormal   Result Value Ref Range    WBC Count 3.3 (L) 4.0 - 11.0 10e3/uL    RBC Count 4.35 3.80 - 5.20 10e6/uL    Hemoglobin 14.0 11.7 - 15.7 g/dL    Hematocrit 39.9 35.0 - 47.0 %    MCV 92 78 - 100 fL    MCH 32.2 26.5 - 33.0 pg    MCHC 35.1 31.5 - 36.5 g/dL    RDW 12.1 10.0 - 15.0 %    Platelet Count 179 150 - 450 10e3/uL    % Neutrophils 67 %    % Lymphocytes 23 %    % Monocytes 9 %    % Eosinophils 1 %     % Basophils 0 %    % Immature Granulocytes 0 %    Absolute Neutrophils 2.2 1.6 - 8.3 10e3/uL    Absolute Lymphocytes 0.8 0.8 - 5.3 10e3/uL    Absolute Monocytes 0.3 0.0 - 1.3 10e3/uL    Absolute Eosinophils 0.0 0.0 - 0.7 10e3/uL    Absolute Basophils 0.0 0.0 - 0.2 10e3/uL    Absolute Immature Granulocytes 0.0 <=0.4 10e3/uL   UA Microscopic with Reflex to Culture     Status: Abnormal   Result Value Ref Range    Bacteria Urine Few (A) None Seen /HPF    RBC Urine 0-2 0-2 /HPF /HPF    WBC Urine 0-5 0-5 /HPF /HPF    Squamous Epithelials Urine Few (A) None Seen /LPF    Narrative    Urine Culture not indicated   CBC with platelets differential     Status: Abnormal    Narrative    The following orders were created for panel order CBC with platelets differential.  Procedure                               Abnormality         Status                     ---------                               -----------         ------                     CBC with platelets and d...[[id number]]  Abnormal            Final result                 Please view results for these tests on the individual orders.                 Signed Electronically by: Ginger Mcbride MD

## 2024-09-16 NOTE — Clinical Note
Thank you for this referral to the [facility]!  Workup did not turn up any definite cause of her pain (specifically negative for PE and cholecystitis) but she will continue to monitor and follow-up if things escalate.

## 2024-09-16 NOTE — LETTER
September 16, 2024      Aleksandra Lucas  [address] [address]        To Whom It May Concern:    Aleksandra Lucas  was seen on 09/16/24.  Please excuse her from work today for this prolonged appointment.         Sincerely,        Ginger Mcbride MD

## 2024-09-16 NOTE — PATIENT INSTRUCTIONS
Watch her symptoms closely and follow-up if you are not seeing improvement.  Avoid activities that cause pain.

## 2024-10-03 ENCOUNTER — OFFICE VISIT (OUTPATIENT)
Dept: NEUROLOGY | Facility: CLINIC | Age: 29
End: 2024-10-03
Payer: COMMERCIAL

## 2024-10-03 DIAGNOSIS — G43.E09 CHRONIC MIGRAINE WITH AURA WITHOUT STATUS MIGRAINOSUS, NOT INTRACTABLE: Primary | ICD-10-CM

## 2024-10-03 PROCEDURE — 64615 CHEMODENERV MUSC MIGRAINE: CPT | Performed by: PSYCHIATRY & NEUROLOGY

## 2024-10-03 NOTE — LETTER
"10/3/2024      Aleksandra Lucas  [address] [address]      Dear Colleague,    Thank you for referring your patient, Aleksandra Lucas, to the [facility]. Please see a copy of my visit note below.    [facility]  Botulinum Toxin Procedure    Pili Linares MD  Headache Neurology    October 3, 2024    Procedure:  OnabotulinumtoxinA injections for chronic migraine  Indication:  Chronic migraine    Ms. Lucas suffers from severe intractable headaches.  She was referred by Dr. Augustin for onabotulinumtoxinA injections for headache.  Risks, benefits, and alternatives were discussed.  All questions were answered and consent given.  She decided to proceed with the injections.      Headache history, from initial consult note: \"Headaches are retro-orbital, temporal, frontal, or sinus pressure (in the winter), neck tension. They are pressure-like, throbbing. They last all day. They rate 8/10. She has associated photophobia, phonophobia, nausea, and vomiting. She has visual aura, with blind spot, bright light lasting up to 2 hours and leading into headache.\"     Prior to initiation of botulinum toxin injections, Ms. Lucas reported 30 headache days per month, with 8-16 severe headache days per month. Her headaches are quite disabling and often interfere with her ability to function normally.    Date of last injections: 7/8/2024    Ms. Lucas reports 3-4 headache days per month currently, with 0 severe headache days per month.  She has noticed a wearing off phenomenon prior to this round of botulinum toxin injections, lasting 2 weeks. In these has had 7 days of headache, all were severe.    Botulinum toxin injections have improved their functioning.     She has attempted other migraine prophylactic treatments in the past, which have included: propranolol, topiramate, and nortriptyline.       She currently takes no other prescription medication for headache " prevention    Ms. Lucas's pain was assessed prior to the procedure.  She rated her pain today as 0 out of 10.    Procedural Pause: Procedural pause was conducted to verify correct patient identity, procedure to be performed, correct side and site, correct patient position, and special requirements. Appropriate hand hygiene was utilized, and each injection site was prepped with alcohol wipe or Chloraprep swab.     Procedure Details: 200 units of onabotulinumtoxinA was diluted in 4 mL 0.9% normal saline. A total of 150 units of onabotulinumtoxinA were injected using 30 gauge 0.5 in needles into the muscles listed below. 50 units of onabotulinumtoxinA were wasted.     Injection Sites: Total = 150 units onabotulinumtoxinA      and Procerus muscles - 5 units into the left and right corrugators and 5 units into the procerus (15 units total)    Frontalis muscles - 5 units into the left superior frontalis and 5 units into the right superior frontalis (2 injection sites per muscle) (10 units total)    Temporalis muscles - 12.5 units into the left temporalis muscle and 12.5 units into the right temporalis muscle (2 injection sites per muscle) (25 units total)    Occipitalis muscles - 12.5 units into the left occipitalis muscle and 12.5 units into the right occipitalis muscle (2 injection sites per muscle) (25 units total)    Splenius Capitis muscles - 12.5 units into the left splenius capitis muscle and 12.5 units into the right splenius capitis muscle (2 injection sites per muscle, divided into 2/3 anteriorly and 1/3 posteriorly) (25 units total)      Trapezius muscles - 25 units into the left trapezius muscle and 25 units into the right trapezius muscle (3 injection sites per muscle, divided 5 units, 10 units, 10 units, medial to lateral) (50 units total)    Ms. Lucas tolerated the procedure well without immediate complications.  She will follow up in clinic for assessment of the effectiveness of treatment.  She  did not report any change in her pain level after the botulinumtoxinA injection procedure.        Pili iLnares MD  Headache Neurology  [facility]       Again, thank you for allowing me to participate in the care of your patient.        Sincerely,        Pili Linares MD

## 2024-10-03 NOTE — PROGRESS NOTES
"[facility]  Botulinum Toxin Procedure    Pili Linares MD  Headache Neurology    October 3, 2024    Procedure:  OnabotulinumtoxinA injections for chronic migraine  Indication:  Chronic migraine    Ms. Lucas suffers from severe intractable headaches.  She was referred by Dr. Augustin for onabotulinumtoxinA injections for headache.  Risks, benefits, and alternatives were discussed.  All questions were answered and consent given.  She decided to proceed with the injections.      Headache history, from initial consult note: \"Headaches are retro-orbital, temporal, frontal, or sinus pressure (in the winter), neck tension. They are pressure-like, throbbing. They last all day. They rate 8/10. She has associated photophobia, phonophobia, nausea, and vomiting. She has visual aura, with blind spot, bright light lasting up to 2 hours and leading into headache.\"     Prior to initiation of botulinum toxin injections, Ms. Lucas reported 30 headache days per month, with 8-16 severe headache days per month. Her headaches are quite disabling and often interfere with her ability to function normally.    Date of last injections: 7/8/2024    Ms. Lucas reports 3-4 headache days per month currently, with 0 severe headache days per month.  She has noticed a wearing off phenomenon prior to this round of botulinum toxin injections, lasting 2 weeks. In these has had 7 days of headache, all were severe.    Botulinum toxin injections have improved their functioning.     She has attempted other migraine prophylactic treatments in the past, which have included: propranolol, topiramate, and nortriptyline.       She currently takes no other prescription medication for headache prevention    Ms. Pickards pain was assessed prior to the procedure.  She rated her pain today as 0 out of 10.    Procedural Pause: Procedural pause was conducted to verify correct patient identity, procedure to be performed, correct side and site, correct " patient position, and special requirements. Appropriate hand hygiene was utilized, and each injection site was prepped with alcohol wipe or Chloraprep swab.     Procedure Details: 200 units of onabotulinumtoxinA was diluted in 4 mL 0.9% normal saline. A total of 150 units of onabotulinumtoxinA were injected using 30 gauge 0.5 in needles into the muscles listed below. 50 units of onabotulinumtoxinA were wasted.     Injection Sites: Total = 150 units onabotulinumtoxinA      and Procerus muscles - 5 units into the left and right corrugators and 5 units into the procerus (15 units total)    Frontalis muscles - 5 units into the left superior frontalis and 5 units into the right superior frontalis (2 injection sites per muscle) (10 units total)    Temporalis muscles - 12.5 units into the left temporalis muscle and 12.5 units into the right temporalis muscle (2 injection sites per muscle) (25 units total)    Occipitalis muscles - 12.5 units into the left occipitalis muscle and 12.5 units into the right occipitalis muscle (2 injection sites per muscle) (25 units total)    Splenius Capitis muscles - 12.5 units into the left splenius capitis muscle and 12.5 units into the right splenius capitis muscle (2 injection sites per muscle, divided into 2/3 anteriorly and 1/3 posteriorly) (25 units total)      Trapezius muscles - 25 units into the left trapezius muscle and 25 units into the right trapezius muscle (3 injection sites per muscle, divided 5 units, 10 units, 10 units, medial to lateral) (50 units total)    Ms. Lucas tolerated the procedure well without immediate complications.  She will follow up in clinic for assessment of the effectiveness of treatment.  She did not report any change in her pain level after the botulinumtoxinA injection procedure.        Pili Linares MD  Headache Neurology  [facility]

## 2024-11-24 ENCOUNTER — MYC MEDICAL ADVICE (OUTPATIENT)
Dept: MIDWIFE SERVICES | Facility: CLINIC | Age: 29
End: 2024-11-24
Payer: COMMERCIAL

## 2024-11-24 DIAGNOSIS — N94.6 PAINFUL MENSTRUATION: Primary | ICD-10-CM

## 2024-11-24 DIAGNOSIS — N93.8 DUB (DYSFUNCTIONAL UTERINE BLEEDING): ICD-10-CM

## 2024-11-25 NOTE — TELEPHONE ENCOUNTER
Pelvic US ordered. Patient may continue monitoring but if bleeding abnormalities persist recommend visit with US, can also discuss dyspareunia at that time as well.    GUNNAR Carpio, CNM

## 2024-11-25 NOTE — TELEPHONE ENCOUNTER
OV 6/4/24 with Donal Womack:    Reviewed that it is normal for menses to very in length when going through stress or intense physical exercise. If menses continue to be heavy or painful we will perform a pelvic ultrasound to rule out abnormalities.     Routing pt Biletut message to provider to advise    US and f/up visit?    Bette Sandra RN on 11/25/2024 at 10:14 AM  [facility] Triage

## 2024-12-16 ENCOUNTER — ANCILLARY PROCEDURE (OUTPATIENT)
Dept: ULTRASOUND IMAGING | Facility: CLINIC | Age: 29
End: 2024-12-16
Attending: ADVANCED PRACTICE MIDWIFE
Payer: COMMERCIAL

## 2024-12-16 DIAGNOSIS — N94.6 PAINFUL MENSTRUATION: ICD-10-CM

## 2024-12-16 DIAGNOSIS — N93.8 DUB (DYSFUNCTIONAL UTERINE BLEEDING): ICD-10-CM

## 2024-12-16 PROCEDURE — 76830 TRANSVAGINAL US NON-OB: CPT | Performed by: OBSTETRICS & GYNECOLOGY

## 2024-12-19 ENCOUNTER — VIRTUAL VISIT (OUTPATIENT)
Dept: MIDWIFE SERVICES | Facility: CLINIC | Age: 29
End: 2024-12-19
Payer: COMMERCIAL

## 2024-12-19 DIAGNOSIS — N94.10 DYSPAREUNIA, FEMALE: ICD-10-CM

## 2024-12-19 DIAGNOSIS — N94.6 DYSMENORRHEA: Primary | ICD-10-CM

## 2024-12-19 DIAGNOSIS — Z13.0 SCREENING, ANEMIA, DEFICIENCY, IRON: ICD-10-CM

## 2024-12-19 NOTE — PROGRESS NOTES
Aleksandra Lucas is a 29 year old female who is being evaluated via a patient initiated billable telephone visit.     What phone number would you like to be contacted at? [phone]   How would you like to obtain your AVS? Jono    Originating Location (pt. Location): home      Distant Location (provider location):  On-site    Call Time: 32 minutes      SUBJECTIVE:                                                   Aleksandra Lucas is a 29 year old female who presents for virtual visit today for the following health issue(s):  Patient presents with:  Ultrasound: F/u to irregular periods and pain with intercourse      HPI:  Menses changed about 2-3 years ago. Gotten a lot heavier after college. Experiencing extreme bloating right before and during period. Very heavy bleeding with clots for the first 3 days. Very fatigued during menses for past few days. Been taking iron during menses to help fight fatigue without success.     Pain with intercourse in all positions. Feels like something is being hit especially on the left side. Always had it, but overall gotten worse in the past few months.     Past 2-3 months menses is shortening in cycle. Currently 23 days long (was 28 days).    She starting taking birth control starting at 17 - POP, ParaGard, and Lucy (2 times). Not on birth control for past 2-3 years.     Pertinent history: Has factor 5. Doesn't want to be on birth control because she wants to start trying to conceive in the next year.         Patient's last menstrual period was 11/23/2024.    Patient is sexually active, No obstetric history on file.  Using none for contraception.    reports that she has never smoked. She has never been exposed to tobacco smoke. She has never used smokeless tobacco.    Health maintenance updated:  yes    Today's PHQ-2 Score:       1/8/2024    10:02 AM   PHQ-2 ( 1999 [employer])   Q1: Little interest or pleasure in doing things 0   Q2: Feeling down, depressed or hopeless 0   PHQ-2  Score 0   Q1: Little interest or pleasure in doing things Not at all   Q2: Feeling down, depressed or hopeless Not at all   PHQ-2 Score 0     Today's PHQ-9 Score:       5/15/2023     2:34 PM   PHQ-9 SCORE   PHQ-9 Total Score 0     Today's JUANCARLOS-7 Score:       6/22/2023     3:15 PM   JUANCARLOS-7 SCORE   Total Score 8       Problem list and histories reviewed & adjusted, as indicated.  Additional history: as documented.    Patient Active Problem List   Diagnosis    Acne    Factor 5 Leiden mutation, heterozygous (H)    Intractable migraine without aura and without status migrainosus     Past Surgical History:   Procedure Laterality Date    OPEN REDUCTION INTERNAL FIXATION ELBOW  8/23/2011    Procedure:OPEN REDUCTION INTERNAL FIXATION ELBOW; open reduction internal fixation left elbow; Surgeon:TOOTIE DRAKE; Location: OR      Social History     Tobacco Use    Smoking status: Never     Passive exposure: Never    Smokeless tobacco: Never    Tobacco comments:     no smoking in the home   Substance Use Topics    Alcohol use: Yes      Problem (# of Occurrences) Relation (Name,Age of Onset)    Heart Failure (1) Paternal Grandfather    Diabetes (1) Maternal Grandmother (Mima)    Hypertension (1) Maternal Grandmother (Mima)    Breast Cancer (1) Maternal Grandmother (Mima, 75)    Factor V Leiden deficiency (2) Mother, Maternal Grandmother (Mima)    Migraines (2) Maternal Uncle, Maternal Uncle    Lung Cancer (1) Maternal Grandmother (Mima)           Negative family history of: Colon Cancer, Prostate Cancer              Current Outpatient Medications   Medication Sig Dispense Refill    albuterol (PROAIR HFA/PROVENTIL HFA/VENTOLIN HFA) 108 (90 Base) MCG/ACT inhaler Inhale 2 puffs into the lungs every 4 hours as needed for shortness of breath, wheezing or other (cough) 18 g 1    Botulinum Toxin Type A (BOTOX) 200 units injection       prochlorperazine (COMPAZINE) 10 MG tablet Take 1 tablet (10 mg) by mouth every 6  hours as needed for other (migraine). 20 tablet 11    rizatriptan (MAXALT-MLT) 10 MG ODT Take 1 tablet (10 mg) by mouth at onset of headache for migraine. May repeat in 2 hours. Max 3 tablets/24 hours. 18 tablet 11    magnesium 250 MG tablet Take 1 tablet by mouth daily (Patient not taking: Reported on 12/19/2024)      multivitamin w/minerals (THERA-VIT-M) tablet Take 1 tablet by mouth daily (Patient not taking: Reported on 12/19/2024)       Current Facility-Administered Medications   Medication Dose Route Frequency Provider Last Rate Last Admin    Botulinum Toxin Type A (BOTOX) 200 units injection 150 Units  150 Units Intramuscular See Admin Instructions    150 Units at 07/08/24 1559    Botulinum Toxin Type A (BOTOX) 200 units injection 150 Units  150 Units Intramuscular Q12 weeks    150 Units at 10/03/24 0825    sodium chloride (PF) 0.9% PF flush 3 mL  3 mL Intravenous q1 min prn          Allergies   Allergen Reactions    No Known Allergies          OBJECTIVE:     No vitals were obtained today due to virtual telephone visit.    Physical Exam  GENERAL: alert and no distress   (female): normal at 6/4/24 annual        ASSESSMENT/PLAN:                                                            ICD-10-CM    1. Dysmenorrhea  N94.6       2. Dyspareunia, female  N94.10 Physical Therapy  Referral      3. Screening, anemia, deficiency, iron  Z13.0 CBC with platelets          There are no Patient Instructions on file for this visit.    Discussed unsure on etiology of pain. At this time we need more information. Ultrasound today was normal. Dysmenorrhea can be normal since it started after stopping birth control. Heavy bleeding may be caused by Factor 5 or endometriosis. Overall we can only rule things out and  find options to help with symptoms at this time. Since she doesn't want to start birth control, I recommended taking a daily max of ibuprofen for 5 days at onset of menses. This will not help in cycle  length, but may help in cramping/ heavy bleeding.     Additionally I recommended she start taking daily iron to help with fatigue.    We discussed using Midol. This can be done, but she must make sure the total level of ibuprofen daily does not exceed 2400 mg.     We reviewed starting pelvic floor therapy to help with dyspareunia.     Plan to follow up in 3 months with a physician. The physician can determine what is working and perform a pelvic exam if indicated. CBC to be done at that visit to look at hgb level.     40 minutes spent by me on the date of the encounter doing chart review, history and exam, documentation and further activities per the note    GUNNAR [place] CNM  [facility] FOR [facility] [place]

## 2025-01-06 ENCOUNTER — OFFICE VISIT (OUTPATIENT)
Dept: NEUROLOGY | Facility: CLINIC | Age: 30
End: 2025-01-06
Payer: COMMERCIAL

## 2025-01-06 DIAGNOSIS — G43.E09 CHRONIC MIGRAINE WITH AURA WITHOUT STATUS MIGRAINOSUS, NOT INTRACTABLE: Primary | ICD-10-CM

## 2025-01-06 PROCEDURE — 64615 CHEMODENERV MUSC MIGRAINE: CPT | Performed by: PSYCHIATRY & NEUROLOGY

## 2025-01-06 ASSESSMENT — HEADACHE IMPACT TEST (HIT 6)
HOW OFTEN DID HEADACHS LIMIT CONCENTRATION ON WORK OR DAILY ACTIVITY: VERY OFTEN
WHEN YOU HAVE A HEADACHE HOW OFTEN DO YOU WISH YOU COULD LIE DOWN: ALWAYS
HOW OFTEN DO HEADACHES LIMIT YOUR DAILY ACTIVITIES: SOMETIMES
HIT6 TOTAL SCORE: 69
WHEN YOU HAVE HEADACHES HOW OFTEN IS THE PAIN SEVERE: VERY OFTEN
HOW OFTEN HAVE YOU FELT FED UP OR IRRITATED BECAUSE OF YOUR HEADACHES: ALWAYS
HOW OFTEN HAVE YOU FELT TOO TIRED TO WORK BECAUSE OF YOUR HEADACHES: VERY OFTEN

## 2025-01-06 NOTE — PROGRESS NOTES
"[facility]  Botulinum Toxin Procedure    Lilliam Augustin MD  Headache Neurology    January 6, 2025    Procedure:  OnabotulinumtoxinA injections for chronic migraine  Indication:  Chronic migraine    Ms. Lucas suffers from severe intractable headaches.  She was referred by Dr. Augustin for onabotulinumtoxinA injections for headache.  Risks, benefits, and alternatives were discussed.  All questions were answered and consent given.  She decided to proceed with the injections.      Headache history, from initial consult note: \"Headaches are retro-orbital, temporal, frontal, or sinus pressure (in the winter), neck tension. They are pressure-like, throbbing. They last all day. They rate 8/10. She has associated photophobia, phonophobia, nausea, and vomiting. She has visual aura, with blind spot, bright light lasting up to 2 hours and leading into headache.\"     Prior to initiation of botulinum toxin injections, Ms. Lucas reported 30 headache days per month, with 8-16 severe headache days per month. Her headaches are quite disabling and often interfere with her ability to function normally.     Date of last injections: 10/3/2024     Ms. Lucas reports 3-4 headache days per month currently, with 0 severe headache days per month.  She has noticed a wearing off phenomenon prior to this round of botulinum toxin injections, two weeks ago. This round was the same.     Botulinum toxin injections have improved her functioning.      She has attempted other migraine prophylactic treatments in the past, which have included: propranolol, topiramate, and nortriptyline.       She currently takes no other prescription medication for headache prevention.    Procedural Pause: Procedural pause was conducted to verify correct patient identity, procedure to be performed, correct side and site, correct patient position, and special requirements. Appropriate hand hygiene was utilized, and each injection site was prepped with alcohol wipe " or Chloraprep swab.     Procedure Details: 200 units of onabotulinumtoxinA was diluted in 4 mL 0.9% normal saline. A total of 150 units of onabotulinumtoxinA were injected using 30 gauge 0.5 in needles into the muscles listed below. 50 units of onabotulinumtoxinA were wasted.     Injection Sites: Total = 150 units onabotulinumtoxinA      and Procerus muscles - 5 units into the left and right corrugators and 5 units into the procerus (15 units total)    Frontalis muscles - 5 units into the left superior frontalis and 5 units into the right superior frontalis (2 injection sites per muscle) (10 units total)    Temporalis muscles - 12.5 units into the left temporalis muscle and 12.5 units into the right temporalis muscle (2 injection sites per muscle) (25 units total)    Occipitalis muscles - 12.5 units into the left occipitalis muscle and 12.5 units into the right occipitalis muscle (2 injection sites per muscle) (25 units total)    Splenius Capitis muscles - 12.5 units into the left splenius capitis muscle and 12.5 units into the right splenius capitis muscle (2 injection sites per muscle, divided into 2/3 anteriorly and 1/3 posteriorly) (25 units total)      Trapezius muscles - 25 units into the left trapezius muscle and 25 units into the right trapezius muscle (3 injection sites per muscle, divided 5 units, 10 units, 10 units, medial to lateral) (50 units total)    Ms. Lucas tolerated the procedure well without immediate complications.  She will follow up in clinic for assessment of the effectiveness of treatment.  She did not report any change in her pain level after the botulinumtoxinA injection procedure.    Lilliam Augustin MD  Headache Neurology  [facility]

## 2025-01-06 NOTE — LETTER
"1/6/2025       RE: Aleksandra Lucas  [address] [address]     Dear Colleague,    Thank you for referring your patient, Aleksandra Lucas, to the [facility] [place] [facility]. Please see a copy of my visit note below.    [facility]  Botulinum Toxin Procedure    Lilliam Augustin MD  Headache Neurology    January 6, 2025    Procedure:  OnabotulinumtoxinA injections for chronic migraine  Indication:  Chronic migraine    Ms. Lucas suffers from severe intractable headaches.  She was referred by Dr. Augustin for onabotulinumtoxinA injections for headache.  Risks, benefits, and alternatives were discussed.  All questions were answered and consent given.  She decided to proceed with the injections.      Headache history, from initial consult note: \"Headaches are retro-orbital, temporal, frontal, or sinus pressure (in the winter), neck tension. They are pressure-like, throbbing. They last all day. They rate 8/10. She has associated photophobia, phonophobia, nausea, and vomiting. She has visual aura, with blind spot, bright light lasting up to 2 hours and leading into headache.\"     Prior to initiation of botulinum toxin injections, Ms. Lucas reported 30 headache days per month, with 8-16 severe headache days per month. Her headaches are quite disabling and often interfere with her ability to function normally.     Date of last injections: 10/3/2024     Ms. Lucas reports 3-4 headache days per month currently, with 0 severe headache days per month.  She has noticed a wearing off phenomenon prior to this round of botulinum toxin injections, two weeks ago. This round was the same.     Botulinum toxin injections have improved her functioning.      She has attempted other migraine prophylactic treatments in the past, which have included: propranolol, topiramate, and nortriptyline.       She currently takes no other prescription " medication for headache prevention.    Procedural Pause: Procedural pause was conducted to verify correct patient identity, procedure to be performed, correct side and site, correct patient position, and special requirements. Appropriate hand hygiene was utilized, and each injection site was prepped with alcohol wipe or Chloraprep swab.     Procedure Details: 200 units of onabotulinumtoxinA was diluted in 4 mL 0.9% normal saline. A total of 150 units of onabotulinumtoxinA were injected using 30 gauge 0.5 in needles into the muscles listed below. 50 units of onabotulinumtoxinA were wasted.     Injection Sites: Total = 150 units onabotulinumtoxinA      and Procerus muscles - 5 units into the left and right corrugators and 5 units into the procerus (15 units total)    Frontalis muscles - 5 units into the left superior frontalis and 5 units into the right superior frontalis (2 injection sites per muscle) (10 units total)    Temporalis muscles - 12.5 units into the left temporalis muscle and 12.5 units into the right temporalis muscle (2 injection sites per muscle) (25 units total)    Occipitalis muscles - 12.5 units into the left occipitalis muscle and 12.5 units into the right occipitalis muscle (2 injection sites per muscle) (25 units total)    Splenius Capitis muscles - 12.5 units into the left splenius capitis muscle and 12.5 units into the right splenius capitis muscle (2 injection sites per muscle, divided into 2/3 anteriorly and 1/3 posteriorly) (25 units total)      Trapezius muscles - 25 units into the left trapezius muscle and 25 units into the right trapezius muscle (3 injection sites per muscle, divided 5 units, 10 units, 10 units, medial to lateral) (50 units total)    Ms. Lucas tolerated the procedure well without immediate complications.  She will follow up in clinic for assessment of the effectiveness of treatment.  She did not report any change in her pain level after the botulinumtoxinA  injection procedure.    Lilliam Augustin MD  Headache Neurology  [facility]      Again, thank you for allowing me to participate in the care of your patient.      Sincerely,    Lilliam Augustin MD

## 2025-01-28 ENCOUNTER — THERAPY VISIT (OUTPATIENT)
Dept: PHYSICAL THERAPY | Facility: CLINIC | Age: 30
End: 2025-01-28
Attending: ADVANCED PRACTICE MIDWIFE
Payer: COMMERCIAL

## 2025-01-28 DIAGNOSIS — N94.10 DYSPAREUNIA, FEMALE: ICD-10-CM

## 2025-01-28 DIAGNOSIS — M62.838 MUSCLE SPASM: Primary | ICD-10-CM

## 2025-01-28 PROCEDURE — 97530 THERAPEUTIC ACTIVITIES: CPT | Mod: GP | Performed by: PHYSICAL THERAPIST

## 2025-01-28 PROCEDURE — 97110 THERAPEUTIC EXERCISES: CPT | Mod: GP | Performed by: PHYSICAL THERAPIST

## 2025-01-28 PROCEDURE — 97140 MANUAL THERAPY 1/> REGIONS: CPT | Mod: GP | Performed by: PHYSICAL THERAPIST

## 2025-01-28 PROCEDURE — 97161 PT EVAL LOW COMPLEX 20 MIN: CPT | Mod: GP | Performed by: PHYSICAL THERAPIST

## 2025-01-28 NOTE — PROGRESS NOTES
PHYSICAL THERAPY EVALUATION  Type of Visit: Evaluation       Fall Risk Screen:  Fall screen completed by: PT  Have you fallen 2 or more times in the past year?: No  Have you fallen and had an injury in the past year?: No  Is patient a fall risk?: No    Subjective         Presenting condition or subjective complaint: Pain associated with sex and period pain symptoms.  Reports this has been ongoing for about over a year for unknown reasons.  Pain with intercourse was always present but didn't pay much attention to it.  Painful period worsen this past year where she is having some more cramping and back pain and fatigue.  Date of onset: 12/19/24    Relevant medical history: Migraines or headaches ,   Past Medical History:   Diagnosis Date    ALLERGIC RHINITIS NOS 06/19/2007    Ragweed, animal dander, grasses, trees    Factor 5 Leiden mutation, heterozygous 08/2012    POSITIVE heterozygous MUTATION    Headache(784.0)     diagnosed by neurologist    Pancreatitis 02/18/2010    Uncomplicated asthma 2012     Dates & types of surgery: 2012 - arm surgery / broken elbow,   Past Surgical History:   Procedure Laterality Date    OPEN REDUCTION INTERNAL FIXATION ELBOW  8/23/2011    Procedure:OPEN REDUCTION INTERNAL FIXATION ELBOW; open reduction internal fixation left elbow; Surgeon:TOOTIE DRAKE; Location:[facility]     Prior diagnostic imaging/testing results:     Pelvic US normal 12/2024.  Prior therapy history for the same diagnosis, illness or injury: No      Prior Level of Function  Transfers:   Ambulation:   ADL:   IADL:     Living Environment  Social support: With a significant other or spouse   Type of home: House; Multi-level   Stairs to enter the home: Yes 3 Is there a railing: No     Ramp: No   Stairs inside the home: Yes 15 Is there a railing: Yes     Help at home: None  Equipment owned:       Employment: Yes   Hobbies/Interests: Gardening, walking, running    Patient goals for therapy:  decrease pain with  intercourse/decrease pelvic pain    Pain assessment:      Objective      PELVIC EVALUATION  ADDITIONAL HISTORY:  Sex assigned at birth: Female  Gender identity: Female    Pronouns: She/Her Hers      Bladder History:  Feels bladder filling: Yes, feels urges  Triggers for feeling of inability to wait to go to the bathroom: No    How long can you wait to urinate: 2-3 hours  Gets up at night to urinate: No    Can stop the flow of urine when urinating: Yes  Volume of urine usually released:  average/normal  Other issues:    Number of bladder infections in last 12 months:    Fluid intake per day: 72-90 oz water, 2 cups of coffee    Medications taken for bladder: No     Activities causing urine leak: Sneeze; Jump; Hurrying to the bathroom due to a strong urge to urinate (pee)    Amount of urine typically leaked: Dribbles  Pads used to help with leaking: No        Bowel History:  Frequency of bowel movement: Daily  Consistency of stool: Hard  type 3-4  Ignores the urge to defecate: No  Other bowel issues:    Length of time spent trying to have a bowel movement:      Sexual Function History:  Sexual orientation: Straight    Sexually active: Yes, pain can limit activity  Lubrication used: No No  Pelvic pain: Certain positions; Initial penetration (rectal or vaginal); Deep penetration (rectal or vaginal)  , can only tolerate better if she is on top for sex position.  Denies pain with use of tampon or use of menstrual cup.    Pain or difficulty with orgasms/erection/ejaculation: No    State of menopause:    Hormone medications: No      Are you currently pregnant: No  Have you been diagnosed with pelvic prolapse or abdominal separation: No  Do you get regular exercise: Yes  I do this type of exercise: Running & walking  Have you tried pelvic floor strengthening exercises for 4 weeks: No  Do you have any history of trauma that is relevant to your care that you d like to share: No      Discussed reason for referral regarding  pelvic health needs and external/internal pelvic floor muscle examination with patient/guardian.  Opportunity provided to ask questions and verbal consent for assessment and intervention was given.    PAIN:   POSTURE: Sitting Posture: Rounded shoulders, Forward head  LUMBAR SCREEN: AROM WNL  HIP SCREEN:  Strength: WNL  Decreased flexibility of B piriformis and adductors    Functional Strength Testing: Double Leg Squat: Improper use of glutes/hips  Single Leg Squat: Knee valgus, Hip internal rotation, Improper use of glutes/hips, and R > L     PELVIC/SI SCREEN:     PAIN PROVOCATION TEST:   PELVIS/SI SPECIAL TESTS:   BREATHING SYMMETRY: Decreased rib cage mobility    PELVIC EXAM  External Visual Inspection:  At rest: Normal  With voluntary pelvic floor contraction: Perineal elevation  Relaxation of PFM: Partial/delayed relaxation, Non-relaxation  With intra-abdominal pressure: Cough: Perineal descent  Valsalva: not assessed  Bearing down as defecation: Perineal descent    Integumentary:   Introitus: Unremarkable    External Digital Palpation per Perineum:   Ischiocavernosis: Unremarkable  Bulbo cavernosis: Unremarkable  Transverse perineal: Unremarkable  Levator ani: Unremarkable  Perineal body: Unremarkable    Scar:   Location/Type:   Mobility:     Internal Digital Palpation:  Per Vagina:  Tenderness  Myofascial Resistance to Palpation: Taut  Digital Muscle Performance: P (Power): 3/5  Relaxation Post-Contraction: Non-relaxation    Per Rectum:        Pelvic Organ Prolapse:       ABDOMINAL ASSESSMENT  Diastasis Rectus Abdominis (OCTAVIO):  OCTAVIO presence: No    Abdominal Activation/Strength:     Scar:   Location/Type:   Mobility:     Fascial Tension/Restriction:     BIOFEEDBACK:  Position:   Surface Electrodes:     Abdominals:     Perianals:       DERMATOMES:   DTR S:     Assessment & Plan   CLINICAL IMPRESSIONS  Medical Diagnosis: dyspareunia    Treatment Diagnosis: Pelvic Floor dysfunction   Impression/Assessment: Patient  is a 29 year old female with dyspareunia/pelvic floor dysfunction complaints.  The following significant findings have been identified: Pain, Decreased ROM/flexibility, Impaired muscle performance, and Decreased activity tolerance. These impairments interfere with their ability to perform self care tasks and recreational activities as compared to previous level of function.     Clinical Decision Making (Complexity):  Clinical Presentation: Stable/Uncomplicated  Clinical Presentation Rationale: based on medical and personal factors listed in PT evaluation  Clinical Decision Making (Complexity): Low complexity    PLAN OF CARE  Treatment Interventions:  Modalities: Biofeedback, Ultrasound  Interventions: Manual Therapy, Neuromuscular Re-education, Therapeutic Activity, Therapeutic Exercise, Self-Care/Home Management    Long Term Goals     PT Goal 1  Goal Identifier: Pelvic floor coordination  Goal Description: Pt will demonstrate improved PFM relaxation (perineal descent) after contraction  Rationale: to maximize safety and independence with self cares (decrease pain with intercourse)  Goal Progress: delayed/non relaxation of PFM  Target Date: 03/29/25      Frequency of Treatment: 1 x a week  Duration of Treatment: 3 weeks tapering to 2 times a month x 2 months    Recommended Referrals to Other Professionals: Physical Therapy  Education Assessment:        Risks and benefits of evaluation/treatment have been explained.   Patient/Family/caregiver agrees with Plan of Care.     Evaluation Time:     PT Eval, Low Complexity Minutes (87037): 25       Signing Clinician: Kashif Walker PT

## 2025-03-19 ENCOUNTER — OFFICE VISIT (OUTPATIENT)
Dept: OBGYN | Facility: CLINIC | Age: 30
End: 2025-03-19
Payer: COMMERCIAL

## 2025-03-19 VITALS
DIASTOLIC BLOOD PRESSURE: 68 MMHG | BODY MASS INDEX: 19.93 KG/M2 | SYSTOLIC BLOOD PRESSURE: 110 MMHG | HEIGHT: 66 IN | WEIGHT: 124 LBS

## 2025-03-19 DIAGNOSIS — Z30.09 FAMILY PLANNING: Primary | ICD-10-CM

## 2025-03-19 DIAGNOSIS — D68.51 FACTOR V LEIDEN CARRIER: ICD-10-CM

## 2025-03-19 DIAGNOSIS — N94.6 DYSMENORRHEA: ICD-10-CM

## 2025-03-19 DIAGNOSIS — N91.4 SECONDARY OLIGOMENORRHEA: ICD-10-CM

## 2025-03-19 DIAGNOSIS — N92.1 MENORRHAGIA WITH IRREGULAR CYCLE: ICD-10-CM

## 2025-03-19 PROCEDURE — 3074F SYST BP LT 130 MM HG: CPT | Performed by: OBSTETRICS & GYNECOLOGY

## 2025-03-19 PROCEDURE — 99244 OFF/OP CNSLTJ NEW/EST MOD 40: CPT | Performed by: OBSTETRICS & GYNECOLOGY

## 2025-03-19 PROCEDURE — 3078F DIAST BP <80 MM HG: CPT | Performed by: OBSTETRICS & GYNECOLOGY

## 2025-03-19 RX ORDER — ACETAMINOPHEN AND CODEINE PHOSPHATE 120; 12 MG/5ML; MG/5ML
0.35 SOLUTION ORAL DAILY
Qty: 84 TABLET | Refills: 4 | Status: SHIPPED | OUTPATIENT
Start: 2025-03-19

## 2025-03-19 NOTE — PROGRESS NOTES
SUBJECTIVE:                                                   Aleksandra Lucas is a 29 year old female who presents to clinic today for the following health issue(s):  Patient presents with:  Follow Up: Pelvic Therapy for Painful Sex and Menstrual Cramping       Additional information: The patient saw a midwife 3 months ago for concerns of painful sex and cramping during her period. She was referred for pelvic therapy, which improved her symptoms, but she still experiences cramping during her period.    Referral from GUNNAR Pabon CNM for management of dysmenorrhea    History of Present Illness-  Obstetric and GYN Problems:  Aleksandra reports experiencing pain during her periods, characterized by severe cramps, bloating, and heavy bleeding. These symptoms have progressively worsened over time. She notes a period of 35 days without menstruation, followed by irregular cycles, including a notably short cycle of 17 to 18 days in the fall. Her typical cycle was previously 25 to 28 days. She tracks her menstrual cycle on her phone. In February, her cramps were so severe that she nearly vomited. The first two to three days of her period are particularly heavy, with blood clots, before becoming lighter and more manageable. She uses heating pads for relief and takes Advil for the cramps.  She has not always had painful periods; during her time on an IUD in college, she rarely had periods, which she attributes to her high level of physical activity at the time. Since discontinuing birth control three to four years ago, her periods have become more regular but increasingly painful. She experiences fatigue during her periods, which she attributes to the bleeding.  Aleksandra also mentioned experiencing pain during sex and has been doing pelvic PT which has helped.    Birth Control History:  Aleksandra began taking birth control pills at around 17 or 18 years old but cannot use estrogen-based birth control due to having  Leiden's factor V. She switched to an IUD due to severe migraines, trying different types including Lucy and a copper IUD. She discontinued birth control entirely three to four years ago in preparation for starting a family.    Exercise and Lifestyle:  Aleksandra is physically active, walking about 10 miles a week with her dog and running more during the summer, up to 5 to 10 miles. She trained for a half marathon in the past and ran a marathon in 2022.    Family Planning:  Aleksandra is recently  and plans to have children in the next one to two years. She is older than her partner and has her own timeline for starting a family, hoping to begin by the time she turns 30. She has been taking prenatal vitamins but struggles with taking iron supplements due to nausea.    Family History:  Aleksandra is not aware of any family history of endometriosis. She has not been diagnosed with IBS.             Patient's last menstrual period was 03/14/2025..     Patient is sexually active, No obstetric history on file..  Using none for contraception.    reports that she has never smoked. She has never been exposed to tobacco smoke. She has never used smokeless tobacco.    STD testing offered?  Declined    Health maintenance updated:  yes    Today's PHQ-2 Score:       3/19/2025     7:41 AM   PHQ-2 ( 1999 [employer])   Q1: Little interest or pleasure in doing things 0   Q2: Feeling down, depressed or hopeless 0   PHQ-2 Score 0    Q1: Little interest or pleasure in doing things Not at all   Q2: Feeling down, depressed or hopeless Not at all   PHQ-2 Score 0       Patient-reported     Today's PHQ-9 Score:       5/15/2023     2:34 PM   PHQ-9 SCORE   PHQ-9 Total Score 0     Today's JUANCARLOS-7 Score:       6/22/2023     3:15 PM   JUANCARLOS-7 SCORE   Total Score 8       Problem list and histories reviewed & adjusted, as indicated.  Additional history: as documented.    Patient Active Problem List   Diagnosis    Acne    Factor 5 Leiden mutation,  heterozygous    Intractable migraine without aura and without status migrainosus    Dyspareunia, female    Muscle spasm     Past Surgical History:   Procedure Laterality Date    OPEN REDUCTION INTERNAL FIXATION ELBOW  8/23/2011    Procedure:OPEN REDUCTION INTERNAL FIXATION ELBOW; open reduction internal fixation left elbow; Surgeon:TOOTIE DRAKE; Location: OR      Social History     Tobacco Use    Smoking status: Never     Passive exposure: Never    Smokeless tobacco: Never    Tobacco comments:     no smoking in the home   Substance Use Topics    Alcohol use: Yes      Problem (# of Occurrences) Relation (Name,Age of Onset)    Heart Failure (1) Paternal Grandfather    Diabetes (1) Maternal Grandmother (Mima)    Hypertension (1) Maternal Grandmother (Mima)    Breast Cancer (1) Maternal Grandmother (Mima, 75)    Factor V Leiden deficiency (2) Mother, Maternal Grandmother (Mima)    Migraines (2) Maternal Uncle, Maternal Uncle    Lung Cancer (1) Maternal Grandmother (Mima)           Negative family history of: Colon Cancer, Prostate Cancer              Current Outpatient Medications   Medication Sig Dispense Refill    norethindrone (MICRONOR) 0.35 MG tablet Take 1 tablet (0.35 mg) by mouth daily. 84 tablet 4    albuterol (PROAIR HFA/PROVENTIL HFA/VENTOLIN HFA) 108 (90 Base) MCG/ACT inhaler Inhale 2 puffs into the lungs every 4 hours as needed for shortness of breath, wheezing or other (cough) 18 g 1    Botulinum Toxin Type A (BOTOX) 200 units injection       magnesium 250 MG tablet Take 1 tablet by mouth daily (Patient not taking: Reported on 12/19/2024)      multivitamin w/minerals (THERA-VIT-M) tablet Take 1 tablet by mouth daily (Patient not taking: Reported on 12/19/2024)      prochlorperazine (COMPAZINE) 10 MG tablet Take 1 tablet (10 mg) by mouth every 6 hours as needed for other (migraine). 20 tablet 11    rizatriptan (MAXALT-MLT) 10 MG ODT Take 1 tablet (10 mg) by mouth at onset of  "headache for migraine. May repeat in 2 hours. Max 3 tablets/24 hours. 18 tablet 11     Current Facility-Administered Medications   Medication Dose Route Frequency Provider Last Rate Last Admin    Botulinum Toxin Type A (BOTOX) 200 units injection 150 Units  150 Units Intramuscular See Admin Instructions    150 Units at 01/06/25 1651    Botulinum Toxin Type A (BOTOX) 200 units injection 150 Units  150 Units Intramuscular See Admin Instructions    150 Units at 07/08/24 1559    Botulinum Toxin Type A (BOTOX) 200 units injection 150 Units  150 Units Intramuscular Q12 weeks    150 Units at 10/03/24 0825    sodium chloride (PF) 0.9% PF flush 3 mL  3 mL Intravenous q1 min prn          Allergies   Allergen Reactions    No Known Allergies        ROS:  12 point review of systems negative other than symptoms noted below or in the HPI.  No urinary frequency or dysuria, bladder or kidney problems      OBJECTIVE:     /68   Ht 1.676 m (5' 6\")   Wt 56.2 kg (124 lb)   LMP 03/14/2025   BMI 20.01 kg/m    Body mass index is 20.01 kg/m .    Exam:  Constitutional:  Appearance: Well nourished, well developed alert, in no acute distress  Psychiatric:  Mentation appears normal and affect normal/bright.     In-Clinic Test Results:  No results found for this or any previous visit (from the past 24 hours).    ASSESSMENT/PLAN:                                                        ICD-10-CM    1. Family planning  Z30.09 Follicle stimulating hormone     Estradiol     Testosterone, total      2. Secondary oligomenorrhea  N91.4 Follicle stimulating hormone     Estradiol     Testosterone, total      3. Dysmenorrhea  N94.6 norethindrone (MICRONOR) 0.35 MG tablet      4. Menorrhagia with irregular cycle  N92.1 norethindrone (MICRONOR) 0.35 MG tablet     CBC with platelets            Assessment & Plan  Dysmenorrhea and Irregular Menstrual Cycles:  - Likely underlying painful period syndrome and this was well managed by previous birth control " use. Dysmenorrhea could be secondary to endometriosis, though not confirmed. No family history of endometriosis. Normal ultrasound, no fibroids or polyps.  Irregular cycles possibly due to exercise intensity.  - Discussed tx for dysmenorrhea. NSAIDs vs hormonal meds, focusing on progesterone only due to hx FVL.  She elects to start progesterone-only pill for control of both pain and bleeding. In mean time can use NSAID 2d before onset of pain/bloating which preceeds period  -Check labs on day three of the menstrual cycle. Make lab only appt for this. Just missed this cycle so she is fine waiting until next cycle  -Continue physical therapy.  -May need to monitor for PMS symptoms and need to treat this once dysmenorrhea and menorrhagia better controlled.    Family Planning:  - Planning for pregnancy in the next one to two years. Concerns about ovulation and cycle regularity. LIkely sensitive to intense exercise. Will cont to monitor.  -  Discussed timing of intercourse for conception. Start prenatal vitamins.    Patient was informed of AI scribe and agreed to its use.    (43 minutes was spent on the date of the encounter doing chart review, review and interpretation of pertinent test results, history and/or exam, documentation, patient counseling.)      Monica Shankar Masters, [facility] [place]

## 2025-03-20 NOTE — PATIENT INSTRUCTIONS
- Start taking the progesterone-only pill to help manage pain and bleeding during your menstrual cycle.  Contraceptively, it will become effective after 2 weeks of use when started with/during menstrual cycle.     - Use NSAIDs, like ibuprofen or Aleeve, two days before you expect pain or bloating to start, which usually happens before your period to lessen the pain during periods.    - Schedule a lab appointment for day three (3rd day of bleeding) of your next menstrual cycle to check your labs. You can wait until your next cycle since you just missed this one.  Labs can be done at any [place] lab. If your day 3 falls on a weekend, let us know so that we can get you scheduled at the hospital outpatient lab.     - Continue with your physical therapy sessions and/or home therapy they have taught you.    - Keep an eye on any PMS symptoms you might experience. These may need attention once your period pain and heavy bleeding are better managed.    - Start taking prenatal vitamins.    - Be mindful of your exercise intensity, as it might affect your menstrual cycle regularity.  
No